# Patient Record
Sex: FEMALE | Race: WHITE | Employment: OTHER | URBAN - NONMETROPOLITAN AREA
[De-identification: names, ages, dates, MRNs, and addresses within clinical notes are randomized per-mention and may not be internally consistent; named-entity substitution may affect disease eponyms.]

---

## 2017-01-01 ENCOUNTER — HOSPITAL ENCOUNTER (INPATIENT)
Age: 82
DRG: 948 | End: 2017-01-01
Attending: ANESTHESIOLOGY | Admitting: ANESTHESIOLOGY
Payer: MEDICARE

## 2017-01-01 ENCOUNTER — APPOINTMENT (OUTPATIENT)
Dept: INTERVENTIONAL RADIOLOGY/VASCULAR | Age: 82
DRG: 034 | End: 2017-01-01
Attending: PSYCHIATRY & NEUROLOGY
Payer: MEDICARE

## 2017-01-01 ENCOUNTER — HOSPITAL ENCOUNTER (EMERGENCY)
Age: 82
Discharge: HOME | End: 2017-09-02
Attending: FAMILY MEDICINE
Payer: MEDICARE

## 2017-01-01 ENCOUNTER — INITIAL CONSULT (OUTPATIENT)
Dept: NEUROLOGY | Age: 82
End: 2017-01-01
Payer: MEDICARE

## 2017-01-01 ENCOUNTER — HOSPITAL ENCOUNTER (INPATIENT)
Dept: INPATIENT UNIT | Age: 82
LOS: 2 days | DRG: 948 | End: 2017-10-15
Attending: FAMILY MEDICINE | Admitting: FAMILY MEDICINE
Payer: MEDICARE

## 2017-01-01 ENCOUNTER — APPOINTMENT (OUTPATIENT)
Dept: CT IMAGING | Age: 82
DRG: 034 | End: 2017-01-01
Attending: PSYCHIATRY & NEUROLOGY
Payer: MEDICARE

## 2017-01-01 ENCOUNTER — APPOINTMENT (OUTPATIENT)
Dept: GENERAL RADIOLOGY | Age: 82
DRG: 034 | End: 2017-01-01
Attending: PSYCHIATRY & NEUROLOGY
Payer: MEDICARE

## 2017-01-01 ENCOUNTER — TELEPHONE (OUTPATIENT)
Dept: NEUROLOGY | Age: 82
End: 2017-01-01

## 2017-01-01 ENCOUNTER — APPOINTMENT (OUTPATIENT)
Dept: GENERAL RADIOLOGY | Age: 82
End: 2017-01-01
Payer: MEDICARE

## 2017-01-01 ENCOUNTER — HOSPITAL ENCOUNTER (OUTPATIENT)
Dept: INTERVENTIONAL RADIOLOGY/VASCULAR | Age: 82
Discharge: HOME | End: 2017-09-22

## 2017-01-01 ENCOUNTER — HOSPITAL ENCOUNTER (OUTPATIENT)
Dept: INTERVENTIONAL RADIOLOGY/VASCULAR | Age: 82
Discharge: HOME | DRG: 034 | End: 2017-09-25
Payer: MEDICARE

## 2017-01-01 ENCOUNTER — APPOINTMENT (OUTPATIENT)
Dept: GENERAL RADIOLOGY | Age: 82
DRG: 948 | End: 2017-01-01
Attending: FAMILY MEDICINE
Payer: MEDICARE

## 2017-01-01 ENCOUNTER — HOSPITAL ENCOUNTER (INPATIENT)
Dept: INPATIENT UNIT | Age: 82
LOS: 16 days | Discharge: NURSING, REHAB OR LONG-TERM CARE | DRG: 034 | End: 2017-10-13
Attending: PSYCHIATRY & NEUROLOGY | Admitting: PSYCHIATRY & NEUROLOGY
Payer: MEDICARE

## 2017-01-01 ENCOUNTER — ANESTHESIA (OUTPATIENT)
Dept: CARDIAC CATH/INVASIVE PROCEDURES | Age: 82
DRG: 034 | End: 2017-01-01
Payer: MEDICARE

## 2017-01-01 ENCOUNTER — ANESTHESIA EVENT (OUTPATIENT)
Dept: CARDIAC CATH/INVASIVE PROCEDURES | Age: 82
DRG: 034 | End: 2017-01-01
Payer: MEDICARE

## 2017-01-01 ENCOUNTER — APPOINTMENT (OUTPATIENT)
Dept: CARDIAC CATH/INVASIVE PROCEDURES | Age: 82
DRG: 034 | End: 2017-01-01
Payer: MEDICARE

## 2017-01-01 VITALS
RESPIRATION RATE: 16 BRPM | HEART RATE: 69 BPM | TEMPERATURE: 97.6 F | BODY MASS INDEX: 20.49 KG/M2 | HEIGHT: 64 IN | OXYGEN SATURATION: 94 % | DIASTOLIC BLOOD PRESSURE: 79 MMHG | WEIGHT: 120 LBS | SYSTOLIC BLOOD PRESSURE: 166 MMHG

## 2017-01-01 VITALS
HEART RATE: 66 BPM | DIASTOLIC BLOOD PRESSURE: 63 MMHG | SYSTOLIC BLOOD PRESSURE: 140 MMHG | TEMPERATURE: 97.8 F | OXYGEN SATURATION: 92 % | RESPIRATION RATE: 18 BRPM | BODY MASS INDEX: 25.2 KG/M2 | HEIGHT: 63 IN | WEIGHT: 142.2 LBS

## 2017-01-01 VITALS
SYSTOLIC BLOOD PRESSURE: 179 MMHG | DIASTOLIC BLOOD PRESSURE: 79 MMHG | HEART RATE: 60 BPM | WEIGHT: 107 LBS | RESPIRATION RATE: 16 BRPM | TEMPERATURE: 97.7 F | BODY MASS INDEX: 18.72 KG/M2

## 2017-01-01 VITALS
HEART RATE: 80 BPM | DIASTOLIC BLOOD PRESSURE: 75 MMHG | BODY MASS INDEX: 20.38 KG/M2 | SYSTOLIC BLOOD PRESSURE: 137 MMHG | HEIGHT: 63 IN | WEIGHT: 115 LBS

## 2017-01-01 VITALS — OXYGEN SATURATION: 100 % | SYSTOLIC BLOOD PRESSURE: 99 MMHG | DIASTOLIC BLOOD PRESSURE: 76 MMHG | TEMPERATURE: 96.8 F

## 2017-01-01 DIAGNOSIS — M54.16 LUMBAR RADICULOPATHY: ICD-10-CM

## 2017-01-01 DIAGNOSIS — I65.23 OCCLUSION AND STENOSIS OF BILATERAL CAROTID ARTERIES: ICD-10-CM

## 2017-01-01 DIAGNOSIS — I65.29 STENOSIS OF CAROTID ARTERY, UNSPECIFIED LATERALITY: Primary | ICD-10-CM

## 2017-01-01 DIAGNOSIS — M48.061 LUMBAR SPINAL STENOSIS: ICD-10-CM

## 2017-01-01 DIAGNOSIS — I46.9 CARDIAC ARREST (HCC): Primary | ICD-10-CM

## 2017-01-01 DIAGNOSIS — S39.012A LUMBAR STRAIN, INITIAL ENCOUNTER: Primary | ICD-10-CM

## 2017-01-01 LAB
ABO CHECK: NORMAL
ABO CHECK: NORMAL
ACT TEG: 128 SECONDS (ref 86–118)
ACTIVATED CLOTTING TIME: 252 SECONDS (ref 1–150)
ACTIVATED CLOTTING TIME: 290 SECONDS (ref 1–150)
ACTIVATED CLOTTING TIME: 290 SECONDS (ref 1–150)
ACTIVATED CLOTTING TIME: 318 SECONDS (ref 1–150)
ALBUMIN SERPL-MCNC: 1.8 G/DL (ref 3.5–5.1)
ALBUMIN SERPL-MCNC: 1.9 G/DL (ref 3.5–5.1)
ALBUMIN SERPL-MCNC: 2 G/DL (ref 3.5–5.1)
ALBUMIN SERPL-MCNC: 2.5 G/DL (ref 3.5–5.1)
ALBUMIN SERPL-MCNC: 3.7 G/DL (ref 3.5–5.1)
ALLEN TEST: ABNORMAL
ALLEN TEST: ABNORMAL
ALLEN TEST: NEGATIVE
ALLEN TEST: POSITIVE
ALP BLD-CCNC: 104 U/L (ref 38–126)
ALP BLD-CCNC: 202 U/L (ref 38–126)
ALP BLD-CCNC: 78 U/L (ref 38–126)
ALP BLD-CCNC: 98 U/L (ref 38–126)
ALP BLD-CCNC: 99 U/L (ref 38–126)
ALT SERPL-CCNC: 10 U/L (ref 11–66)
ALT SERPL-CCNC: 29 U/L (ref 11–66)
ALT SERPL-CCNC: 41 U/L (ref 11–66)
ALT SERPL-CCNC: 41 U/L (ref 11–66)
ALT SERPL-CCNC: 42 U/L (ref 11–66)
AMORPHOUS: ABNORMAL
ANGLE, RAPID TEG: 73.5 DEG (ref 64–80)
ANION GAP SERPL CALCULATED.3IONS-SCNC: 10 MEQ/L (ref 8–16)
ANION GAP SERPL CALCULATED.3IONS-SCNC: 11 MEQ/L (ref 8–16)
ANION GAP SERPL CALCULATED.3IONS-SCNC: 12 MEQ/L (ref 8–16)
ANION GAP SERPL CALCULATED.3IONS-SCNC: 13 MEQ/L (ref 8–16)
ANION GAP SERPL CALCULATED.3IONS-SCNC: 14 MEQ/L (ref 8–16)
ANION GAP SERPL CALCULATED.3IONS-SCNC: 15 MEQ/L (ref 8–16)
ANION GAP SERPL CALCULATED.3IONS-SCNC: 18 MEQ/L (ref 8–16)
ANION GAP SERPL CALCULATED.3IONS-SCNC: 8 MEQ/L (ref 8–16)
ANION GAP SERPL CALCULATED.3IONS-SCNC: 9 MEQ/L (ref 8–16)
ANISOCYTOSIS: ABNORMAL
ANTIBODY SCREEN: NORMAL
ANTIBODY SCREEN: NORMAL
APTT: 28.7 SECONDS (ref 22–38)
APTT: 29 SECONDS (ref 22–38)
APTT: 30.1 SECONDS (ref 22–38)
APTT: 30.9 SECONDS (ref 22–38)
APTT: 31 SECONDS (ref 22–38)
APTT: 32.5 SECONDS (ref 22–38)
APTT: 32.8 SECONDS (ref 22–38)
APTT: 33.3 SECONDS (ref 22–38)
APTT: 33.5 SECONDS (ref 22–38)
APTT: 37.5 SECONDS (ref 22–38)
APTT: 39.8 SECONDS (ref 22–38)
APTT: > 200 SECONDS (ref 22–38)
AST SERPL-CCNC: 16 U/L (ref 5–40)
AST SERPL-CCNC: 29 U/L (ref 5–40)
AST SERPL-CCNC: 47 U/L (ref 5–40)
AST SERPL-CCNC: 53 U/L (ref 5–40)
AST SERPL-CCNC: 80 U/L (ref 5–40)
BACTERIA: ABNORMAL
BACTERIA: ABNORMAL
BACTERIA: ABNORMAL /HPF
BACTERIA: ABNORMAL /HPF
BANDED NEUTROPHILS ABSOLUTE COUNT: 0.4 THOU/MM3
BANDS PRESENT: 1 %
BASE EXCESS (CALCULATED): -1.5 MMOL/L (ref -2.5–2.5)
BASE EXCESS (CALCULATED): -11.9 MMOL/L (ref -2.5–2.5)
BASE EXCESS (CALCULATED): -12.9 MMOL/L (ref -2.5–2.5)
BASE EXCESS (CALCULATED): -4.3 MMOL/L (ref -2.5–2.5)
BASE EXCESS (CALCULATED): -9.5 MMOL/L (ref -2.5–2.5)
BASE EXCESS (CALCULATED): 0 MMOL/L (ref -2.5–2.5)
BASE EXCESS (CALCULATED): 10.9 MMOL/L (ref -2.5–2.5)
BASE EXCESS (CALCULATED): 11.1 MMOL/L (ref -2.5–2.5)
BASE EXCESS (CALCULATED): 11.2 MMOL/L (ref -2.5–2.5)
BASE EXCESS (CALCULATED): 11.5 MMOL/L (ref -2.5–2.5)
BASE EXCESS (CALCULATED): 11.7 MMOL/L (ref -2.5–2.5)
BASE EXCESS (CALCULATED): 12.2 MMOL/L (ref -2.5–2.5)
BASE EXCESS (CALCULATED): 12.6 MMOL/L (ref -2.5–2.5)
BASE EXCESS (CALCULATED): 12.8 MMOL/L (ref -2.5–2.5)
BASE EXCESS (CALCULATED): 5.6 MMOL/L (ref -2.5–2.5)
BASE EXCESS (CALCULATED): 6.4 MMOL/L (ref -2.5–2.5)
BASE EXCESS (CALCULATED): 8.1 MMOL/L (ref -2.5–2.5)
BASE EXCESS MIXED: -12.2 MMOL/L (ref -2–3)
BASE EXCESS MIXED: 10.1 MMOL/L (ref -2–3)
BASE EXCESS MIXED: 3.6 MMOL/L (ref -2–3)
BASE EXCESS MIXED: 5.6 MMOL/L (ref -2–3)
BASOPHILS # BLD: 0 %
BASOPHILS # BLD: 0.1 %
BASOPHILS # BLD: 0.6 %
BASOPHILS # BLD: 0.7 %
BASOPHILS ABSOLUTE: 0 THOU/MM3 (ref 0–0.1)
BASOPHILS ABSOLUTE: 0 THOU/MM3 (ref 0–0.1)
BASOPHILS ABSOLUTE: 0.1 THOU/MM3 (ref 0–0.1)
BASOPHILS ABSOLUTE: 0.1 THOU/MM3 (ref 0–0.1)
BILIRUB SERPL-MCNC: 0.2 MG/DL (ref 0.3–1.2)
BILIRUB SERPL-MCNC: 0.3 MG/DL (ref 0.3–1.2)
BILIRUB SERPL-MCNC: 0.3 MG/DL (ref 0.3–1.2)
BILIRUB SERPL-MCNC: 0.4 MG/DL (ref 0.3–1.2)
BILIRUB SERPL-MCNC: 0.4 MG/DL (ref 0.3–1.2)
BILIRUBIN DIRECT: < 0.2 MG/DL (ref 0–0.3)
BILIRUBIN DIRECT: < 0.2 MG/DL (ref 0–0.3)
BILIRUBIN URINE: ABNORMAL
BILIRUBIN URINE: ABNORMAL
BILIRUBIN URINE: NEGATIVE
BILIRUBIN URINE: NEGATIVE
BLOOD, URINE: ABNORMAL
BLOOD, URINE: NEGATIVE
BUN BLDV-MCNC: 15 MG/DL (ref 7–22)
BUN BLDV-MCNC: 16 MG/DL (ref 7–22)
BUN BLDV-MCNC: 16 MG/DL (ref 7–22)
BUN BLDV-MCNC: 17 MG/DL (ref 7–22)
BUN BLDV-MCNC: 18 MG/DL (ref 7–22)
BUN BLDV-MCNC: 18 MG/DL (ref 7–22)
BUN BLDV-MCNC: 19 MG/DL (ref 7–22)
BUN BLDV-MCNC: 20 MG/DL (ref 7–22)
BUN BLDV-MCNC: 20 MG/DL (ref 7–22)
BUN BLDV-MCNC: 21 MG/DL (ref 7–22)
BUN BLDV-MCNC: 22 MG/DL (ref 7–22)
BUN BLDV-MCNC: 22 MG/DL (ref 7–22)
BUN BLDV-MCNC: 23 MG/DL (ref 7–22)
BUN BLDV-MCNC: 24 MG/DL (ref 7–22)
BUN BLDV-MCNC: 27 MG/DL (ref 7–22)
BUN BLDV-MCNC: 27 MG/DL (ref 7–22)
BUN BLDV-MCNC: 28 MG/DL (ref 7–22)
BUN BLDV-MCNC: 30 MG/DL (ref 7–22)
BUN BLDV-MCNC: 31 MG/DL (ref 7–22)
BUN BLDV-MCNC: 32 MG/DL (ref 7–22)
BUN BLDV-MCNC: 32 MG/DL (ref 7–22)
BUN BLDV-MCNC: 34 MG/DL (ref 7–22)
BUN BLDV-MCNC: 35 MG/DL (ref 7–22)
BUN BLDV-MCNC: 36 MG/DL (ref 7–22)
BUN BLDV-MCNC: 37 MG/DL (ref 7–22)
BUN BLDV-MCNC: 38 MG/DL (ref 7–22)
BUN BLDV-MCNC: 39 MG/DL (ref 7–22)
CALCIUM IONIZED SERUM: 0.35 MMOL/L (ref 1.12–1.32)
CALCIUM IONIZED SERUM: 0.36 MMOL/L (ref 1.12–1.32)
CALCIUM IONIZED SERUM: 0.36 MMOL/L (ref 1.12–1.32)
CALCIUM IONIZED SERUM: 0.37 MMOL/L (ref 1.12–1.32)
CALCIUM IONIZED SERUM: 0.38 MMOL/L (ref 1.12–1.32)
CALCIUM IONIZED SERUM: 0.4 MMOL/L (ref 1.12–1.32)
CALCIUM IONIZED SERUM: 0.42 MMOL/L (ref 1.12–1.32)
CALCIUM IONIZED SERUM: 0.44 MMOL/L (ref 1.12–1.32)
CALCIUM IONIZED SERUM: 0.45 MMOL/L (ref 1.12–1.32)
CALCIUM IONIZED SERUM: 0.47 MMOL/L (ref 1.12–1.32)
CALCIUM IONIZED SERUM: 0.47 MMOL/L (ref 1.12–1.32)
CALCIUM IONIZED SERUM: 0.49 MMOL/L (ref 1.12–1.32)
CALCIUM IONIZED SERUM: 0.49 MMOL/L (ref 1.12–1.32)
CALCIUM IONIZED SERUM: 0.5 MMOL/L (ref 1.12–1.32)
CALCIUM IONIZED SERUM: 0.51 MMOL/L (ref 1.12–1.32)
CALCIUM IONIZED SERUM: 0.52 MMOL/L (ref 1.12–1.32)
CALCIUM IONIZED SERUM: 0.53 MMOL/L (ref 1.12–1.32)
CALCIUM IONIZED SERUM: 0.54 MMOL/L (ref 1.12–1.32)
CALCIUM IONIZED SERUM: 0.55 MMOL/L (ref 1.12–1.32)
CALCIUM IONIZED SERUM: 0.55 MMOL/L (ref 1.12–1.32)
CALCIUM IONIZED SERUM: 0.56 MMOL/L (ref 1.12–1.32)
CALCIUM IONIZED SERUM: 0.75 MMOL/L (ref 1.12–1.32)
CALCIUM IONIZED SERUM: 0.85 MMOL/L (ref 1.12–1.32)
CALCIUM IONIZED SERUM: 0.92 MMOL/L (ref 1.12–1.32)
CALCIUM IONIZED SERUM: 0.96 MMOL/L (ref 1.12–1.32)
CALCIUM IONIZED SERUM: 0.97 MMOL/L (ref 1.12–1.32)
CALCIUM IONIZED SERUM: 0.98 MMOL/L (ref 1.12–1.32)
CALCIUM IONIZED SERUM: 0.99 MMOL/L (ref 1.12–1.32)
CALCIUM IONIZED SERUM: 1 MMOL/L (ref 1.12–1.32)
CALCIUM IONIZED SERUM: 1 MMOL/L (ref 1.12–1.32)
CALCIUM IONIZED SERUM: 1.01 MMOL/L (ref 1.12–1.32)
CALCIUM IONIZED SERUM: 1.02 MMOL/L (ref 1.12–1.32)
CALCIUM IONIZED SERUM: 1.02 MMOL/L (ref 1.12–1.32)
CALCIUM IONIZED SERUM: 1.03 MMOL/L (ref 1.12–1.32)
CALCIUM IONIZED SERUM: 1.03 MMOL/L (ref 1.12–1.32)
CALCIUM IONIZED SERUM: 1.04 MMOL/L (ref 1.12–1.32)
CALCIUM IONIZED SERUM: 1.05 MMOL/L (ref 1.12–1.32)
CALCIUM IONIZED SERUM: 1.06 MMOL/L (ref 1.12–1.32)
CALCIUM IONIZED SERUM: 1.07 MMOL/L (ref 1.12–1.32)
CALCIUM IONIZED SERUM: 1.07 MMOL/L (ref 1.12–1.32)
CALCIUM IONIZED SERUM: 1.09 MMOL/L (ref 1.12–1.32)
CALCIUM IONIZED SERUM: 1.1 MMOL/L (ref 1.12–1.32)
CALCIUM IONIZED SERUM: 1.12 MMOL/L (ref 1.12–1.32)
CALCIUM IONIZED SERUM: 1.13 MMOL/L (ref 1.12–1.32)
CALCIUM IONIZED: 0.88 MMOL/L (ref 1.12–1.32)
CALCIUM IONIZED: 0.95 MMOL/L (ref 1.12–1.32)
CALCIUM IONIZED: 0.97 MMOL/L (ref 1.12–1.32)
CALCIUM IONIZED: 0.98 MMOL/L (ref 1.12–1.32)
CALCIUM IONIZED: 0.99 MMOL/L (ref 1.12–1.32)
CALCIUM IONIZED: 0.99 MMOL/L (ref 1.12–1.32)
CALCIUM IONIZED: 1.01 MMOL/L (ref 1.12–1.32)
CALCIUM IONIZED: 1.03 MMOL/L (ref 1.12–1.32)
CALCIUM IONIZED: 1.04 MMOL/L (ref 1.12–1.32)
CALCIUM IONIZED: 1.1 MMOL/L (ref 1.12–1.32)
CALCIUM IONIZED: 1.1 MMOL/L (ref 1.12–1.32)
CALCIUM IONIZED: 1.11 MMOL/L (ref 1.12–1.32)
CALCIUM IONIZED: 1.16 MMOL/L (ref 1.12–1.32)
CALCIUM IONIZED: 1.22 MMOL/L (ref 1.12–1.32)
CALCIUM SERPL-MCNC: 6.9 MG/DL (ref 8.5–10.5)
CALCIUM SERPL-MCNC: 6.9 MG/DL (ref 8.5–10.5)
CALCIUM SERPL-MCNC: 7 MG/DL (ref 8.5–10.5)
CALCIUM SERPL-MCNC: 7.3 MG/DL (ref 8.5–10.5)
CALCIUM SERPL-MCNC: 7.3 MG/DL (ref 8.5–10.5)
CALCIUM SERPL-MCNC: 7.4 MG/DL (ref 8.5–10.5)
CALCIUM SERPL-MCNC: 7.4 MG/DL (ref 8.5–10.5)
CALCIUM SERPL-MCNC: 7.5 MG/DL (ref 8.5–10.5)
CALCIUM SERPL-MCNC: 7.6 MG/DL (ref 8.5–10.5)
CALCIUM SERPL-MCNC: 7.7 MG/DL (ref 8.5–10.5)
CALCIUM SERPL-MCNC: 7.8 MG/DL (ref 8.5–10.5)
CALCIUM SERPL-MCNC: 7.9 MG/DL (ref 8.5–10.5)
CALCIUM SERPL-MCNC: 7.9 MG/DL (ref 8.5–10.5)
CALCIUM SERPL-MCNC: 8 MG/DL (ref 8.5–10.5)
CALCIUM SERPL-MCNC: 8 MG/DL (ref 8.5–10.5)
CALCIUM SERPL-MCNC: 8.1 MG/DL (ref 8.5–10.5)
CALCIUM SERPL-MCNC: 8.2 MG/DL (ref 8.5–10.5)
CALCIUM SERPL-MCNC: 8.2 MG/DL (ref 8.5–10.5)
CALCIUM SERPL-MCNC: 8.3 MG/DL (ref 8.5–10.5)
CALCIUM SERPL-MCNC: 8.4 MG/DL (ref 8.5–10.5)
CALCIUM SERPL-MCNC: 8.5 MG/DL (ref 8.5–10.5)
CALCIUM SERPL-MCNC: 8.8 MG/DL (ref 8.5–10.5)
CALCIUM SERPL-MCNC: 8.8 MG/DL (ref 8.5–10.5)
CALCIUM SERPL-MCNC: 9 MG/DL (ref 8.5–10.5)
CALCIUM SERPL-MCNC: 9 MG/DL (ref 8.5–10.5)
CALCIUM SERPL-MCNC: 9.2 MG/DL (ref 8.5–10.5)
CALCIUM SERPL-MCNC: 9.4 MG/DL (ref 8.5–10.5)
CASTS 2: ABNORMAL /LPF
CASTS 2: ABNORMAL /LPF
CASTS UA: ABNORMAL /LPF
CASTS UA: ABNORMAL /LPF
CASTS: ABNORMAL /LPF
CHARACTER, URINE: ABNORMAL
CHARACTER, URINE: CLEAR
CHLORIDE BLD-SCNC: 100 MEQ/L (ref 98–111)
CHLORIDE BLD-SCNC: 101 MEQ/L (ref 98–111)
CHLORIDE BLD-SCNC: 102 MEQ/L (ref 98–111)
CHLORIDE BLD-SCNC: 103 MEQ/L (ref 98–111)
CHLORIDE BLD-SCNC: 104 MEQ/L (ref 98–111)
CHLORIDE BLD-SCNC: 105 MEQ/L (ref 98–111)
CHLORIDE BLD-SCNC: 107 MEQ/L (ref 98–111)
CHLORIDE BLD-SCNC: 108 MEQ/L (ref 98–111)
CHLORIDE BLD-SCNC: 109 MEQ/L (ref 98–111)
CHLORIDE BLD-SCNC: 110 MEQ/L (ref 98–111)
CHLORIDE BLD-SCNC: 91 MEQ/L (ref 98–111)
CHLORIDE BLD-SCNC: 92 MEQ/L (ref 98–111)
CHLORIDE BLD-SCNC: 94 MEQ/L (ref 98–111)
CHLORIDE BLD-SCNC: 96 MEQ/L (ref 98–111)
CHLORIDE BLD-SCNC: 97 MEQ/L (ref 98–111)
CHLORIDE BLD-SCNC: 98 MEQ/L (ref 98–111)
CHLORIDE BLD-SCNC: 98 MEQ/L (ref 98–111)
CHLORIDE BLD-SCNC: 99 MEQ/L (ref 98–111)
CHLORIDE, URINE: 49 MEQ/L
CO2: 14 MEQ/L (ref 23–33)
CO2: 14 MEQ/L (ref 23–33)
CO2: 19 MEQ/L (ref 23–33)
CO2: 20 MEQ/L (ref 23–33)
CO2: 22 MEQ/L (ref 23–33)
CO2: 23 MEQ/L (ref 23–33)
CO2: 24 MEQ/L (ref 23–33)
CO2: 25 MEQ/L (ref 23–33)
CO2: 26 MEQ/L (ref 23–33)
CO2: 27 MEQ/L (ref 23–33)
CO2: 28 MEQ/L (ref 23–33)
CO2: 30 MEQ/L (ref 23–33)
CO2: 30 MEQ/L (ref 23–33)
CO2: 32 MEQ/L (ref 23–33)
CO2: 33 MEQ/L (ref 23–33)
CO2: 34 MEQ/L (ref 23–33)
CO2: 34 MEQ/L (ref 23–33)
CO2: 35 MEQ/L (ref 23–33)
COLLECTED BY:: ABNORMAL
COLOR: ABNORMAL
COLOR: YELLOW
COMMENT: ABNORMAL
COMMENT: NORMAL
COMMENT: NORMAL
CREAT SERPL-MCNC: 0.7 MG/DL (ref 0.4–1.2)
CREAT SERPL-MCNC: 0.8 MG/DL (ref 0.4–1.2)
CREAT SERPL-MCNC: 0.9 MG/DL (ref 0.4–1.2)
CREAT SERPL-MCNC: 1 MG/DL (ref 0.4–1.2)
CREAT SERPL-MCNC: 1.1 MG/DL (ref 0.4–1.2)
CREAT SERPL-MCNC: 1.1 MG/DL (ref 0.4–1.2)
CREAT SERPL-MCNC: 1.2 MG/DL (ref 0.4–1.2)
CREAT SERPL-MCNC: 1.2 MG/DL (ref 0.4–1.2)
CREAT SERPL-MCNC: 1.3 MG/DL (ref 0.4–1.2)
CREAT SERPL-MCNC: 1.3 MG/DL (ref 0.4–1.2)
CREAT SERPL-MCNC: 1.4 MG/DL (ref 0.4–1.2)
CREAT SERPL-MCNC: 1.4 MG/DL (ref 0.4–1.2)
CREAT SERPL-MCNC: 1.5 MG/DL (ref 0.4–1.2)
CREAT SERPL-MCNC: 1.5 MG/DL (ref 0.4–1.2)
CREAT SERPL-MCNC: 1.6 MG/DL (ref 0.4–1.2)
CREAT SERPL-MCNC: 2 MG/DL (ref 0.4–1.2)
CREAT SERPL-MCNC: 2.2 MG/DL (ref 0.4–1.2)
CREAT SERPL-MCNC: 2.3 MG/DL (ref 0.4–1.2)
CREAT SERPL-MCNC: 2.3 MG/DL (ref 0.4–1.2)
CREAT SERPL-MCNC: 2.4 MG/DL (ref 0.4–1.2)
CRENATED RBC'S: ABNORMAL
CRYSTALS, UA: ABNORMAL
CRYSTALS, UA: ABNORMAL
CRYSTALS: ABNORMAL
CRYSTALS: ABNORMAL
DEVICE: ABNORMAL
DIFFERENTIAL TYPE: ABNORMAL
EKG ATRIAL RATE: 74 BPM
EKG ATRIAL RATE: 82 BPM
EKG P-R INTERVAL: 146 MS
EKG P-R INTERVAL: 192 MS
EKG Q-T INTERVAL: 420 MS
EKG Q-T INTERVAL: 444 MS
EKG QRS DURATION: 74 MS
EKG QRS DURATION: 88 MS
EKG QTC CALCULATION (BAZETT): 490 MS
EKG QTC CALCULATION (BAZETT): 492 MS
EKG R AXIS: -33 DEGREES
EKG R AXIS: -44 DEGREES
EKG T AXIS: -127 DEGREES
EKG T AXIS: 81 DEGREES
EKG VENTRICULAR RATE: 74 BPM
EKG VENTRICULAR RATE: 82 BPM
EOSINOPHIL # BLD: 0 %
EOSINOPHIL # BLD: 0.1 %
EOSINOPHIL # BLD: 1.4 %
EOSINOPHIL # BLD: 3.6 %
EOSINOPHILS ABSOLUTE: 0 THOU/MM3 (ref 0–0.4)
EOSINOPHILS ABSOLUTE: 0 THOU/MM3 (ref 0–0.4)
EOSINOPHILS ABSOLUTE: 0.1 THOU/MM3 (ref 0–0.4)
EOSINOPHILS ABSOLUTE: 0.3 THOU/MM3 (ref 0–0.4)
EPITHELIAL CELLS, UA: ABNORMAL /HPF
EPL-TEG: 0 % (ref 0–15)
FIBRINOGEN: 155 MG/100ML (ref 155–475)
FIBRINOGEN: 194 MG/100ML (ref 155–475)
FIBRINOGEN: 195 MG/100ML (ref 155–475)
FIO2, MIXED VENOUS: 25
FIO2, MIXED VENOUS: 30
GFR SERPL CREATININE-BSD FRML MDRD: 19 ML/MIN/1.73M2
GFR SERPL CREATININE-BSD FRML MDRD: 20 ML/MIN/1.73M2
GFR SERPL CREATININE-BSD FRML MDRD: 20 ML/MIN/1.73M2
GFR SERPL CREATININE-BSD FRML MDRD: 21 ML/MIN/1.73M2
GFR SERPL CREATININE-BSD FRML MDRD: 24 ML/MIN/1.73M2
GFR SERPL CREATININE-BSD FRML MDRD: 31 ML/MIN/1.73M2
GFR SERPL CREATININE-BSD FRML MDRD: 33 ML/MIN/1.73M2
GFR SERPL CREATININE-BSD FRML MDRD: 33 ML/MIN/1.73M2
GFR SERPL CREATININE-BSD FRML MDRD: 36 ML/MIN/1.73M2
GFR SERPL CREATININE-BSD FRML MDRD: 36 ML/MIN/1.73M2
GFR SERPL CREATININE-BSD FRML MDRD: 39 ML/MIN/1.73M2
GFR SERPL CREATININE-BSD FRML MDRD: 39 ML/MIN/1.73M2
GFR SERPL CREATININE-BSD FRML MDRD: 43 ML/MIN/1.73M2
GFR SERPL CREATININE-BSD FRML MDRD: 43 ML/MIN/1.73M2
GFR SERPL CREATININE-BSD FRML MDRD: 48 ML/MIN/1.73M2
GFR SERPL CREATININE-BSD FRML MDRD: 48 ML/MIN/1.73M2
GFR SERPL CREATININE-BSD FRML MDRD: 53 ML/MIN/1.73M2
GFR SERPL CREATININE-BSD FRML MDRD: 60 ML/MIN/1.73M2
GFR SERPL CREATININE-BSD FRML MDRD: 69 ML/MIN/1.73M2
GFR SERPL CREATININE-BSD FRML MDRD: 80 ML/MIN/1.73M2
GLUCOSE BLD-MCNC: 101 MG/DL (ref 70–108)
GLUCOSE BLD-MCNC: 102 MG/DL (ref 70–108)
GLUCOSE BLD-MCNC: 102 MG/DL (ref 70–108)
GLUCOSE BLD-MCNC: 105 MG/DL (ref 70–108)
GLUCOSE BLD-MCNC: 106 MG/DL (ref 70–108)
GLUCOSE BLD-MCNC: 106 MG/DL (ref 70–108)
GLUCOSE BLD-MCNC: 110 MG/DL (ref 70–108)
GLUCOSE BLD-MCNC: 112 MG/DL (ref 70–108)
GLUCOSE BLD-MCNC: 112 MG/DL (ref 70–108)
GLUCOSE BLD-MCNC: 115 MG/DL (ref 70–108)
GLUCOSE BLD-MCNC: 115 MG/DL (ref 70–108)
GLUCOSE BLD-MCNC: 116 MG/DL (ref 70–108)
GLUCOSE BLD-MCNC: 118 MG/DL (ref 70–108)
GLUCOSE BLD-MCNC: 120 MG/DL (ref 70–108)
GLUCOSE BLD-MCNC: 121 MG/DL (ref 70–108)
GLUCOSE BLD-MCNC: 124 MG/DL (ref 70–108)
GLUCOSE BLD-MCNC: 129 MG/DL (ref 70–108)
GLUCOSE BLD-MCNC: 131 MG/DL (ref 70–108)
GLUCOSE BLD-MCNC: 133 MG/DL (ref 70–108)
GLUCOSE BLD-MCNC: 134 MG/DL (ref 70–108)
GLUCOSE BLD-MCNC: 143 MG/DL (ref 70–108)
GLUCOSE BLD-MCNC: 143 MG/DL (ref 70–108)
GLUCOSE BLD-MCNC: 144 MG/DL (ref 70–108)
GLUCOSE BLD-MCNC: 145 MG/DL (ref 70–108)
GLUCOSE BLD-MCNC: 147 MG/DL (ref 70–108)
GLUCOSE BLD-MCNC: 148 MG/DL (ref 70–108)
GLUCOSE BLD-MCNC: 151 MG/DL (ref 70–108)
GLUCOSE BLD-MCNC: 159 MG/DL (ref 70–108)
GLUCOSE BLD-MCNC: 160 MG/DL (ref 70–108)
GLUCOSE BLD-MCNC: 160 MG/DL (ref 70–108)
GLUCOSE BLD-MCNC: 161 MG/DL (ref 70–108)
GLUCOSE BLD-MCNC: 165 MG/DL (ref 70–108)
GLUCOSE BLD-MCNC: 184 MG/DL (ref 70–108)
GLUCOSE BLD-MCNC: 72 MG/DL (ref 70–108)
GLUCOSE BLD-MCNC: 82 MG/DL (ref 70–108)
GLUCOSE BLD-MCNC: 83 MG/DL (ref 70–108)
GLUCOSE BLD-MCNC: 90 MG/DL (ref 70–108)
GLUCOSE BLD-MCNC: 91 MG/DL (ref 70–108)
GLUCOSE BLD-MCNC: 93 MG/DL (ref 70–108)
GLUCOSE BLD-MCNC: 97 MG/DL (ref 70–108)
GLUCOSE URINE: 100 MG/DL
GLUCOSE URINE: NEGATIVE MG/DL
GLUCOSE, URINE: NEGATIVE MG/DL
GLUCOSE, URINE: NEGATIVE MG/DL
GLUCOSE, WHOLE BLOOD: 126 MG/DL (ref 70–108)
GLUCOSE, WHOLE BLOOD: 133 MG/DL (ref 70–108)
GLUCOSE, WHOLE BLOOD: 223 MG/DL (ref 70–108)
GRAM STAIN RESULT: NORMAL
GRAM STAIN RESULT: NORMAL
HCO3, MIXED: 16 MMOL/L (ref 23–28)
HCO3, MIXED: 30 MMOL/L (ref 23–28)
HCO3, MIXED: 30 MMOL/L (ref 23–28)
HCO3, MIXED: 35 MMOL/L (ref 23–28)
HCO3: 13 MMOL/L (ref 23–28)
HCO3: 14 MMOL/L (ref 23–28)
HCO3: 18 MMOL/L (ref 23–28)
HCO3: 19 MMOL/L (ref 23–28)
HCO3: 22 MMOL/L (ref 23–28)
HCO3: 24 MMOL/L (ref 23–28)
HCO3: 28 MMOL/L (ref 23–28)
HCO3: 32 MMOL/L (ref 23–28)
HCO3: 32 MMOL/L (ref 23–28)
HCO3: 35 MMOL/L (ref 23–28)
HCO3: 36 MMOL/L (ref 23–28)
HCO3: 37 MMOL/L (ref 23–28)
HCT VFR BLD CALC: 19.4 % (ref 37–47)
HCT VFR BLD CALC: 19.7 % (ref 37–47)
HCT VFR BLD CALC: 20.5 % (ref 37–47)
HCT VFR BLD CALC: 21.1 % (ref 37–47)
HCT VFR BLD CALC: 21.4 % (ref 37–47)
HCT VFR BLD CALC: 22 % (ref 37–47)
HCT VFR BLD CALC: 22.5 % (ref 37–47)
HCT VFR BLD CALC: 22.8 % (ref 37–47)
HCT VFR BLD CALC: 23.2 % (ref 37–47)
HCT VFR BLD CALC: 23.8 % (ref 37–47)
HCT VFR BLD CALC: 23.8 % (ref 37–47)
HCT VFR BLD CALC: 23.9 % (ref 37–47)
HCT VFR BLD CALC: 24.6 % (ref 37–47)
HCT VFR BLD CALC: 26.9 % (ref 37–47)
HCT VFR BLD CALC: 27.1 % (ref 37–47)
HCT VFR BLD CALC: 27.7 % (ref 37–47)
HCT VFR BLD CALC: 27.9 % (ref 37–47)
HCT VFR BLD CALC: 29.1 % (ref 37–47)
HCT VFR BLD CALC: 29.3 % (ref 37–47)
HCT VFR BLD CALC: 31.5 % (ref 37–47)
HCT VFR BLD CALC: 33.9 % (ref 37–47)
HCT VFR BLD CALC: 34.3 % (ref 37–47)
HCT VFR BLD CALC: 34.3 % (ref 37–47)
HCT VFR BLD CALC: 37.6 % (ref 37–47)
HCT VFR BLD CALC: 41.7 % (ref 37–47)
HCT VFR BLD CALC: 42 % (ref 37–47)
HEMOGLOBIN: 10 GM/DL (ref 12–16)
HEMOGLOBIN: 10.4 GM/DL (ref 12–16)
HEMOGLOBIN: 11.2 GM/DL (ref 12–16)
HEMOGLOBIN: 11.3 GM/DL (ref 12–16)
HEMOGLOBIN: 11.6 GM/DL (ref 12–16)
HEMOGLOBIN: 12.5 GM/DL (ref 12–16)
HEMOGLOBIN: 14 GM/DL (ref 12–16)
HEMOGLOBIN: 14.1 GM/DL (ref 12–16)
HEMOGLOBIN: 6.7 GM/DL (ref 12–16)
HEMOGLOBIN: 6.8 GM/DL (ref 12–16)
HEMOGLOBIN: 7.1 GM/DL (ref 12–16)
HEMOGLOBIN: 7.3 GM/DL (ref 12–16)
HEMOGLOBIN: 7.3 GM/DL (ref 12–16)
HEMOGLOBIN: 7.4 GM/DL (ref 12–16)
HEMOGLOBIN: 7.9 GM/DL (ref 12–16)
HEMOGLOBIN: 7.9 GM/DL (ref 12–16)
HEMOGLOBIN: 8 GM/DL (ref 12–16)
HEMOGLOBIN: 8.1 GM/DL (ref 12–16)
HEMOGLOBIN: 8.1 GM/DL (ref 12–16)
HEMOGLOBIN: 8.2 GM/DL (ref 12–16)
HEMOGLOBIN: 8.3 GM/DL (ref 12–16)
HEMOGLOBIN: 9 GM/DL (ref 12–16)
HEMOGLOBIN: 9.1 GM/DL (ref 12–16)
HEMOGLOBIN: 9.1 GM/DL (ref 12–16)
HEMOGLOBIN: 9.3 GM/DL (ref 12–16)
HEMOGLOBIN: 9.8 GM/DL (ref 12–16)
HEPARIN THERAPY: NO
ICTOTEST: NEGATIVE
ICTOTEST: NEGATIVE
IFIO2: 2
IFIO2: 21
IFIO2: 21
IFIO2: 25
IFIO2: 25
IFIO2: 3
IFIO2: 30
IFIO2: 35
IFIO2: 4
IFIO2: 4
IFIO2: 40
IFIO2: 50
INR BLD: 0.95 (ref 0.85–1.13)
INR BLD: 0.97 (ref 0.85–1.13)
INR BLD: 1 (ref 0.85–1.13)
INR BLD: 1 (ref 0.85–1.13)
INR BLD: 1.01 (ref 0.85–1.13)
INR BLD: 1.03 (ref 0.85–1.13)
INR BLD: 1.06 (ref 0.85–1.13)
INR BLD: 1.08 (ref 0.85–1.13)
INR BLD: 1.08 (ref 0.85–1.13)
INR BLD: 1.11 (ref 0.85–1.13)
INR BLD: 1.11 (ref 0.85–1.13)
INR BLD: 1.33 (ref 0.85–1.13)
KETONES, URINE: 15
KETONES, URINE: ABNORMAL
KETONES, URINE: NEGATIVE
KETONES, URINE: NEGATIVE
KINETICS RAPID TEG: 1.2 MINUTES (ref 0.5–2.3)
LACTIC ACID: 1.7 MMOL/L (ref 0.5–2.2)
LACTIC ACID: 2.1 MMOL/L (ref 0.5–2.2)
LACTIC ACID: 2.6 MMOL/L (ref 0.5–2.2)
LACTIC ACID: 3.7 MMOL/L (ref 0.5–2.2)
LACTIC ACID: 4 MMOL/L (ref 0.5–2.2)
LEUKOCYTE EST, POC: ABNORMAL
LEUKOCYTE EST, POC: NEGATIVE
LEUKOCYTE ESTERASE, URINE: ABNORMAL
LEUKOCYTE ESTERASE, URINE: NEGATIVE
LV EF: 48 %
LVEF MODALITY: NORMAL
LY30 (LYSIS) TEG: 0 % (ref 0–7.5)
LYMPHOCYTES # BLD: 13.1 %
LYMPHOCYTES # BLD: 3 %
LYMPHOCYTES # BLD: 7.3 %
LYMPHOCYTES # BLD: 9.9 %
LYMPHOCYTES ABSOLUTE: 0.6 THOU/MM3 (ref 1–4.8)
LYMPHOCYTES ABSOLUTE: 1 THOU/MM3 (ref 1–4.8)
LYMPHOCYTES ABSOLUTE: 1.2 THOU/MM3 (ref 1–4.8)
LYMPHOCYTES ABSOLUTE: 1.7 THOU/MM3 (ref 1–4.8)
MA(MAX CLOT) RAPID TEG: 62 MM (ref 52–71)
MAGNESIUM: 1.5 MG/DL (ref 1.6–2.4)
MAGNESIUM: 1.6 MG/DL (ref 1.6–2.4)
MAGNESIUM: 1.7 MG/DL (ref 1.6–2.4)
MAGNESIUM: 1.7 MG/DL (ref 1.6–2.4)
MAGNESIUM: 1.8 MG/DL (ref 1.6–2.4)
MAGNESIUM: 1.9 MG/DL (ref 1.6–2.4)
MAGNESIUM: 2 MG/DL (ref 1.6–2.4)
MAGNESIUM: 2.1 MG/DL (ref 1.6–2.4)
MCH RBC QN AUTO: 29.7 PG (ref 27–31)
MCH RBC QN AUTO: 29.8 PG (ref 27–31)
MCH RBC QN AUTO: 29.8 PG (ref 27–31)
MCH RBC QN AUTO: 29.9 PG (ref 27–31)
MCH RBC QN AUTO: 30.1 PG (ref 27–31)
MCH RBC QN AUTO: 30.2 PG (ref 27–31)
MCH RBC QN AUTO: 30.3 PG (ref 27–31)
MCH RBC QN AUTO: 30.4 PG (ref 27–31)
MCH RBC QN AUTO: 30.4 PG (ref 27–31)
MCH RBC QN AUTO: 30.5 PG (ref 27–31)
MCH RBC QN AUTO: 30.5 PG (ref 27–31)
MCH RBC QN AUTO: 30.7 PG (ref 27–31)
MCH RBC QN AUTO: 30.8 PG (ref 27–31)
MCH RBC QN AUTO: 31 PG (ref 27–31)
MCH RBC QN AUTO: 31.1 PG (ref 27–31)
MCH RBC QN AUTO: 31.2 PG (ref 27–31)
MCHC RBC AUTO-ENTMCNC: 32.5 GM/DL (ref 33–37)
MCHC RBC AUTO-ENTMCNC: 32.9 GM/DL (ref 33–37)
MCHC RBC AUTO-ENTMCNC: 32.9 GM/DL (ref 33–37)
MCHC RBC AUTO-ENTMCNC: 33 GM/DL (ref 33–37)
MCHC RBC AUTO-ENTMCNC: 33 GM/DL (ref 33–37)
MCHC RBC AUTO-ENTMCNC: 33.2 GM/DL (ref 33–37)
MCHC RBC AUTO-ENTMCNC: 33.3 GM/DL (ref 33–37)
MCHC RBC AUTO-ENTMCNC: 33.4 GM/DL (ref 33–37)
MCHC RBC AUTO-ENTMCNC: 33.5 GM/DL (ref 33–37)
MCHC RBC AUTO-ENTMCNC: 33.5 GM/DL (ref 33–37)
MCHC RBC AUTO-ENTMCNC: 33.7 GM/DL (ref 33–37)
MCHC RBC AUTO-ENTMCNC: 33.7 GM/DL (ref 33–37)
MCHC RBC AUTO-ENTMCNC: 33.9 GM/DL (ref 33–37)
MCHC RBC AUTO-ENTMCNC: 34.2 GM/DL (ref 33–37)
MCHC RBC AUTO-ENTMCNC: 34.3 GM/DL (ref 33–37)
MCHC RBC AUTO-ENTMCNC: 34.4 GM/DL (ref 33–37)
MCHC RBC AUTO-ENTMCNC: 34.4 GM/DL (ref 33–37)
MCHC RBC AUTO-ENTMCNC: 34.5 GM/DL (ref 33–37)
MCHC RBC AUTO-ENTMCNC: 34.6 GM/DL (ref 33–37)
MCHC RBC AUTO-ENTMCNC: 34.6 GM/DL (ref 33–37)
MCHC RBC AUTO-ENTMCNC: 34.7 GM/DL (ref 33–37)
MCHC RBC AUTO-ENTMCNC: 34.8 GM/DL (ref 33–37)
MCHC RBC AUTO-ENTMCNC: 35.1 GM/DL (ref 33–37)
MCV RBC AUTO: 85.9 FL (ref 81–99)
MCV RBC AUTO: 86.2 FL (ref 81–99)
MCV RBC AUTO: 86.3 FL (ref 81–99)
MCV RBC AUTO: 86.3 FL (ref 81–99)
MCV RBC AUTO: 86.4 FL (ref 81–99)
MCV RBC AUTO: 86.6 FL (ref 81–99)
MCV RBC AUTO: 86.9 FL (ref 81–99)
MCV RBC AUTO: 87.1 FL (ref 81–99)
MCV RBC AUTO: 87.6 FL (ref 81–99)
MCV RBC AUTO: 87.7 FL (ref 81–99)
MCV RBC AUTO: 88.3 FL (ref 81–99)
MCV RBC AUTO: 89 FL (ref 81–99)
MCV RBC AUTO: 89.2 FL (ref 81–99)
MCV RBC AUTO: 89.6 FL (ref 81–99)
MCV RBC AUTO: 90.3 FL (ref 81–99)
MCV RBC AUTO: 90.3 FL (ref 81–99)
MCV RBC AUTO: 90.6 FL (ref 81–99)
MCV RBC AUTO: 91.1 FL (ref 81–99)
MCV RBC AUTO: 91.1 FL (ref 81–99)
MCV RBC AUTO: 91.6 FL (ref 81–99)
MCV RBC AUTO: 92.4 FL (ref 81–99)
MCV RBC AUTO: 92.7 FL (ref 81–99)
MCV RBC AUTO: 92.8 FL (ref 81–99)
MCV RBC AUTO: 93.2 FL (ref 81–99)
MCV RBC AUTO: 94.8 FL (ref 81–99)
MISCELLANEOUS 2: ABNORMAL
MISCELLANEOUS 2: ABNORMAL
MISCELLANEOUS LAB TEST RESULT: ABNORMAL
MODE: ABNORMAL
MODE: AC
MONOCYTES # BLD: 12.2 %
MONOCYTES # BLD: 13 %
MONOCYTES # BLD: 6.8 %
MONOCYTES # BLD: 7 %
MONOCYTES ABSOLUTE: 0.9 THOU/MM3 (ref 0.4–1.3)
MONOCYTES ABSOLUTE: 1.1 THOU/MM3 (ref 0.4–1.3)
MONOCYTES ABSOLUTE: 1.2 THOU/MM3 (ref 0.4–1.3)
MONOCYTES ABSOLUTE: 2.7 THOU/MM3 (ref 0.4–1.3)
MRSA SCREEN RT-PCR: NEGATIVE
MRSA SCREEN RT-PCR: NEGATIVE
MRSA SCREEN: NORMAL
MRSA SCREEN: NORMAL
NITRITE, URINE: NEGATIVE
NUCLEATED RED BLOOD CELLS: 0 /100 WBC
O2 SAT, MIXED: 37 %
O2 SAT, MIXED: 38 %
O2 SAT, MIXED: 44 %
O2 SAT, MIXED: 72 %
O2 SATURATION: 100 %
O2 SATURATION: 81 %
O2 SATURATION: 91 %
O2 SATURATION: 91 %
O2 SATURATION: 93 %
O2 SATURATION: 95 %
O2 SATURATION: 95 %
O2 SATURATION: 96 %
O2 SATURATION: 96 %
O2 SATURATION: 97 %
O2 SATURATION: 98 %
O2 SATURATION: 99 %
ORGANISM: ABNORMAL
ORGANISM: ABNORMAL
OSMOLALITY CALCULATION: 277.6 MOSMOL/KG (ref 275–300)
PATHOLOGIST REVIEW: ABNORMAL
PCO2 (TEMP CORRECTED): 26 (ref 35–45)
PCO2, MIXED VENOUS: 43 MMHG (ref 41–51)
PCO2, MIXED VENOUS: 45 MMHG (ref 41–51)
PCO2, MIXED VENOUS: 46 MMHG (ref 41–51)
PCO2, MIXED VENOUS: 55 MMHG (ref 41–51)
PCO2: 25 MMHG (ref 35–45)
PCO2: 28 MMHG (ref 35–45)
PCO2: 29 MMHG (ref 35–45)
PCO2: 30 MMHG (ref 35–45)
PCO2: 35 MMHG (ref 35–45)
PCO2: 37 MMHG (ref 35–45)
PCO2: 38 MMHG (ref 35–45)
PCO2: 41 MMHG (ref 35–45)
PCO2: 42 MMHG (ref 35–45)
PCO2: 43 MMHG (ref 35–45)
PCO2: 43 MMHG (ref 35–45)
PCO2: 44 MMHG (ref 35–45)
PCO2: 44 MMHG (ref 35–45)
PCO2: 45 MMHG (ref 35–45)
PCO2: 45 MMHG (ref 35–45)
PCO2: 46 MMHG (ref 35–45)
PCO2: 49 MMHG (ref 35–45)
PDW BLD-RTO: 13.7 % (ref 11.5–14.5)
PDW BLD-RTO: 13.7 % (ref 11.5–14.5)
PDW BLD-RTO: 14.2 % (ref 11.5–14.5)
PDW BLD-RTO: 14.3 % (ref 11.5–14.5)
PDW BLD-RTO: 14.4 % (ref 11.5–14.5)
PDW BLD-RTO: 14.5 % (ref 11.5–14.5)
PDW BLD-RTO: 14.6 % (ref 11.5–14.5)
PDW BLD-RTO: 14.7 % (ref 11.5–14.5)
PDW BLD-RTO: 14.8 % (ref 11.5–14.5)
PDW BLD-RTO: 14.9 % (ref 11.5–14.5)
PDW BLD-RTO: 14.9 % (ref 11.5–14.5)
PDW BLD-RTO: 15 % (ref 11.5–14.5)
PDW BLD-RTO: 15 % (ref 11.5–14.5)
PDW BLD-RTO: 15.1 % (ref 11.5–14.5)
PDW BLD-RTO: 15.3 % (ref 11.5–14.5)
PDW BLD-RTO: 15.3 % (ref 11.5–14.5)
PDW BLD-RTO: 15.7 % (ref 11.5–14.5)
PDW BLD-RTO: 15.7 % (ref 11.5–14.5)
PDW BLD-RTO: 16.8 % (ref 11.5–14.5)
PDW BLD-RTO: 17.1 % (ref 11.5–14.5)
PDW BLD-RTO: 17.4 % (ref 11.5–14.5)
PH (TEMPERATURE CORRECTED): 7.31 (ref 7.35–7.45)
PH BLOOD GAS: 7.19 (ref 7.35–7.45)
PH BLOOD GAS: 7.22 (ref 7.35–7.45)
PH BLOOD GAS: 7.32 (ref 7.35–7.45)
PH BLOOD GAS: 7.42 (ref 7.35–7.45)
PH BLOOD GAS: 7.44 (ref 7.35–7.45)
PH BLOOD GAS: 7.45 (ref 7.35–7.45)
PH BLOOD GAS: 7.48 (ref 7.35–7.45)
PH BLOOD GAS: 7.49 (ref 7.35–7.45)
PH BLOOD GAS: 7.5 (ref 7.35–7.45)
PH BLOOD GAS: 7.51 (ref 7.35–7.45)
PH BLOOD GAS: 7.52 (ref 7.35–7.45)
PH BLOOD GAS: 7.54 (ref 7.35–7.45)
PH BLOOD GAS: 7.55 (ref 7.35–7.45)
PH BLOOD GAS: 7.59 (ref 7.35–7.45)
PH UA: 5
PH UA: 6
PH UA: 6
PH UA: 6.5
PH, MIXED: 7.17 (ref 7.31–7.41)
PH, MIXED: 7.35 (ref 7.31–7.41)
PH, MIXED: 7.44 (ref 7.31–7.41)
PH, MIXED: 7.49 (ref 7.31–7.41)
PHOSPHORUS: 1.9 MG/DL (ref 2.4–4.7)
PHOSPHORUS: 2 MG/DL (ref 2.4–4.7)
PHOSPHORUS: 2.1 MG/DL (ref 2.4–4.7)
PHOSPHORUS: 2.3 MG/DL (ref 2.4–4.7)
PHOSPHORUS: 2.5 MG/DL (ref 2.4–4.7)
PHOSPHORUS: 2.7 MG/DL (ref 2.4–4.7)
PHOSPHORUS: 2.8 MG/DL (ref 2.4–4.7)
PHOSPHORUS: 3.1 MG/DL (ref 2.4–4.7)
PHOSPHORUS: 3.4 MG/DL (ref 2.4–4.7)
PHOSPHORUS: 3.5 MG/DL (ref 2.4–4.7)
PHOSPHORUS: 5 MG/DL (ref 2.4–4.7)
PLATELET # BLD: 101 THOU/MM3 (ref 130–400)
PLATELET # BLD: 103 THOU/MM3 (ref 130–400)
PLATELET # BLD: 105 THOU/MM3 (ref 130–400)
PLATELET # BLD: 136 THOU/MM3 (ref 130–400)
PLATELET # BLD: 140 THOU/MM3 (ref 130–400)
PLATELET # BLD: 181 THOU/MM3 (ref 130–400)
PLATELET # BLD: 208 THOU/MM3 (ref 130–400)
PLATELET # BLD: 214 THOU/MM3 (ref 130–400)
PLATELET # BLD: 226 THOU/MM3 (ref 130–400)
PLATELET # BLD: 235 THOU/MM3 (ref 130–400)
PLATELET # BLD: 235 THOU/MM3 (ref 130–400)
PLATELET # BLD: 269 THOU/MM3 (ref 130–400)
PLATELET # BLD: 283 THOU/MM3 (ref 130–400)
PLATELET # BLD: 286 THOU/MM3 (ref 130–400)
PLATELET # BLD: 287 THOU/MM3 (ref 130–400)
PLATELET # BLD: 298 THOU/MM3 (ref 130–400)
PLATELET # BLD: 30 THOU/MM3 (ref 130–400)
PLATELET # BLD: 36 THOU/MM3 (ref 130–400)
PLATELET # BLD: 40 THOU/MM3 (ref 130–400)
PLATELET # BLD: 40 THOU/MM3 (ref 130–400)
PLATELET # BLD: 48 THOU/MM3 (ref 130–400)
PLATELET # BLD: 55 THOU/MM3 (ref 130–400)
PLATELET # BLD: 78 THOU/MM3 (ref 130–400)
PLATELET # BLD: 85 THOU/MM3 (ref 130–400)
PLATELET # BLD: 90 THOU/MM3 (ref 130–400)
PLATELET ESTIMATE: ADEQUATE
PMV BLD AUTO: 7.5 MCM (ref 7.4–10.4)
PMV BLD AUTO: 7.8 MCM (ref 7.4–10.4)
PMV BLD AUTO: 7.8 MCM (ref 7.4–10.4)
PMV BLD AUTO: 8 MCM (ref 7.4–10.4)
PMV BLD AUTO: 8.3 MCM (ref 7.4–10.4)
PMV BLD AUTO: 8.4 MCM (ref 7.4–10.4)
PMV BLD AUTO: 8.5 MCM (ref 7.4–10.4)
PMV BLD AUTO: 8.7 MCM (ref 7.4–10.4)
PMV BLD AUTO: 8.9 MCM (ref 7.4–10.4)
PMV BLD AUTO: 9 MCM (ref 7.4–10.4)
PMV BLD AUTO: 9 MCM (ref 7.4–10.4)
PMV BLD AUTO: 9.1 MCM (ref 7.4–10.4)
PMV BLD AUTO: 9.2 MCM (ref 7.4–10.4)
PMV BLD AUTO: 9.7 MCM (ref 7.4–10.4)
PMV BLD AUTO: 9.8 MCM (ref 7.4–10.4)
PO2 (TEMP CORRECTED): 116 (ref 71–104)
PO2 MIXED: 21 MMHG (ref 25–40)
PO2 MIXED: 26 MMHG (ref 25–40)
PO2 MIXED: 28 MMHG (ref 25–40)
PO2 MIXED: 35 MMHG (ref 25–40)
PO2: 101 MMHG (ref 71–104)
PO2: 102 MMHG (ref 71–104)
PO2: 112 MMHG (ref 71–104)
PO2: 120 MMHG (ref 71–104)
PO2: 200 MMHG (ref 71–104)
PO2: 41 MMHG (ref 71–104)
PO2: 53 MMHG (ref 71–104)
PO2: 59 MMHG (ref 71–104)
PO2: 62 MMHG (ref 71–104)
PO2: 75 MMHG (ref 71–104)
PO2: 78 MMHG (ref 71–104)
PO2: 78 MMHG (ref 71–104)
PO2: 79 MMHG (ref 71–104)
PO2: 93 MMHG (ref 71–104)
PO2: 93 MMHG (ref 71–104)
PO2: 96 MMHG (ref 71–104)
PO2: 98 MMHG (ref 71–104)
POC ACTIVATED CLOTTING TIME KAOLIN: 153 SECONDS (ref 1–150)
POC ACTIVATED CLOTTING TIME KAOLIN: 169 SECONDS (ref 1–150)
POIKILOCYTES: ABNORMAL
POTASSIUM SERPL-SCNC: 3.3 MEQ/L (ref 3.5–5.2)
POTASSIUM SERPL-SCNC: 3.4 MEQ/L (ref 3.5–5.2)
POTASSIUM SERPL-SCNC: 3.4 MEQ/L (ref 3.5–5.2)
POTASSIUM SERPL-SCNC: 3.5 MEQ/L (ref 3.5–5.2)
POTASSIUM SERPL-SCNC: 3.5 MEQ/L (ref 3.5–5.2)
POTASSIUM SERPL-SCNC: 3.6 MEQ/L (ref 3.5–5.2)
POTASSIUM SERPL-SCNC: 3.7 MEQ/L (ref 3.5–5.2)
POTASSIUM SERPL-SCNC: 3.8 MEQ/L (ref 3.5–5.2)
POTASSIUM SERPL-SCNC: 3.9 MEQ/L (ref 3.5–5.2)
POTASSIUM SERPL-SCNC: 4.1 MEQ/L (ref 3.5–5.2)
POTASSIUM SERPL-SCNC: 4.1 MEQ/L (ref 3.5–5.2)
POTASSIUM SERPL-SCNC: 4.2 MEQ/L (ref 3.5–5.2)
POTASSIUM SERPL-SCNC: 4.3 MEQ/L (ref 3.5–5.2)
POTASSIUM SERPL-SCNC: 4.4 MEQ/L (ref 3.5–5.2)
POTASSIUM SERPL-SCNC: 4.6 MEQ/L (ref 3.5–5.2)
POTASSIUM SERPL-SCNC: 4.8 MEQ/L (ref 3.5–5.2)
POTASSIUM SERPL-SCNC: 4.9 MEQ/L (ref 3.5–5.2)
POTASSIUM SERPL-SCNC: 5.1 MEQ/L (ref 3.5–5.2)
POTASSIUM SERPL-SCNC: 5.1 MEQ/L (ref 3.5–5.2)
PROCALCITONIN: 1.81 NG/ML (ref 0.01–0.09)
PROCALCITONIN: 2.04 NG/ML (ref 0.01–0.09)
PROTEIN UA: 100 MG/DL
PROTEIN UA: 30
PROTEIN UA: ABNORMAL MG/DL
PROTEIN UA: NEGATIVE
RBC # BLD: 2.21 MILL/MM3 (ref 4.2–5.4)
RBC # BLD: 2.24 MILL/MM3 (ref 4.2–5.4)
RBC # BLD: 2.37 MILL/MM3 (ref 4.2–5.4)
RBC # BLD: 2.44 MILL/MM3 (ref 4.2–5.4)
RBC # BLD: 2.47 MILL/MM3 (ref 4.2–5.4)
RBC # BLD: 2.49 MILL/MM3 (ref 4.2–5.4)
RBC # BLD: 2.59 MILL/MM3 (ref 4.2–5.4)
RBC # BLD: 2.61 MILL/MM3 (ref 4.2–5.4)
RBC # BLD: 2.61 MILL/MM3 (ref 4.2–5.4)
RBC # BLD: 2.65 MILL/MM3 (ref 4.2–5.4)
RBC # BLD: 2.73 MILL/MM3 (ref 4.2–5.4)
RBC # BLD: 2.75 MILL/MM3 (ref 4.2–5.4)
RBC # BLD: 2.78 MILL/MM3 (ref 4.2–5.4)
RBC # BLD: 2.97 MILL/MM3 (ref 4.2–5.4)
RBC # BLD: 2.99 MILL/MM3 (ref 4.2–5.4)
RBC # BLD: 3 MILL/MM3 (ref 4.2–5.4)
RBC # BLD: 3.02 MILL/MM3 (ref 4.2–5.4)
RBC # BLD: 3.25 MILL/MM3 (ref 4.2–5.4)
RBC # BLD: 3.25 MILL/MM3 (ref 4.2–5.4)
RBC # BLD: 3.4 MILL/MM3 (ref 4.2–5.4)
RBC # BLD: 3.7 MILL/MM3 (ref 4.2–5.4)
RBC # BLD: 3.78 MILL/MM3 (ref 4.2–5.4)
RBC # BLD: 3.8 MILL/MM3 (ref 4.2–5.4)
RBC # BLD: 4.5 MILL/MM3 (ref 4.2–5.4)
RBC # BLD: 4.61 MILL/MM3 (ref 4.2–5.4)
RBC # BLD: ABNORMAL 10*6/UL
RBC # BLD: NORMAL 10*6/UL
RBC URINE: ABNORMAL /HPF
REACTION TIME RAPID TEG: 0.8 MINUTES (ref 0.4–1)
RENAL EPITHELIAL, UA: ABNORMAL
RESPIRATORY CULTURE: NORMAL
RESPIRATORY CULTURE: NORMAL
RH FACTOR: NORMAL
RH FACTOR: NORMAL
SCAN OF BLOOD SMEAR: NORMAL
SCHISTOCYTES: ABNORMAL
SEG NEUTROPHILS: 72.6 %
SEG NEUTROPHILS: 75.5 %
SEG NEUTROPHILS: 83.1 %
SEG NEUTROPHILS: 89 %
SEGMENTED NEUTROPHILS ABSOLUTE COUNT: 14.2 THOU/MM3 (ref 1.8–7.7)
SEGMENTED NEUTROPHILS ABSOLUTE COUNT: 34.3 THOU/MM3 (ref 1.8–7.7)
SEGMENTED NEUTROPHILS ABSOLUTE COUNT: 5.4 THOU/MM3 (ref 1.8–7.7)
SEGMENTED NEUTROPHILS ABSOLUTE COUNT: 6.5 THOU/MM3 (ref 1.8–7.7)
SET PEEP: 5 MMHG
SET PRESS SUPP: 10 CMH2O
SET PRESS SUPP: 5 CMH2O
SET PRESS SUPP: 6 CMH2O
SET RESPIRATORY RATE: 12 BPM
SET RESPIRATORY RATE: 13 BPM
SET RESPIRATORY RATE: 14 BPM
SET RESPIRATORY RATE: 18 BPM
SET RESPIRATORY RATE: 25 BPM
SET TIDAL VOLUME: 400 ML
SET TIDAL VOLUME: 550 ML
SET TIDAL VOLUME: 550 ML
SITE: ABNORMAL
SODIUM BLD-SCNC: 125 MEQ/L (ref 135–145)
SODIUM BLD-SCNC: 125 MEQ/L (ref 135–145)
SODIUM BLD-SCNC: 126 MEQ/L (ref 135–145)
SODIUM BLD-SCNC: 132 MEQ/L (ref 135–145)
SODIUM BLD-SCNC: 135 MEQ/L (ref 135–145)
SODIUM BLD-SCNC: 135 MEQ/L (ref 135–145)
SODIUM BLD-SCNC: 136 MEQ/L (ref 135–145)
SODIUM BLD-SCNC: 137 MEQ/L (ref 135–145)
SODIUM BLD-SCNC: 137 MEQ/L (ref 135–145)
SODIUM BLD-SCNC: 139 MEQ/L (ref 135–145)
SODIUM BLD-SCNC: 139 MEQ/L (ref 135–145)
SODIUM BLD-SCNC: 140 MEQ/L (ref 135–145)
SODIUM BLD-SCNC: 140 MEQ/L (ref 135–145)
SODIUM BLD-SCNC: 141 MEQ/L (ref 135–145)
SODIUM BLD-SCNC: 142 MEQ/L (ref 135–145)
SODIUM BLD-SCNC: 142 MEQ/L (ref 135–145)
SODIUM BLD-SCNC: 143 MEQ/L (ref 135–145)
SODIUM BLD-SCNC: 143 MEQ/L (ref 135–145)
SODIUM BLD-SCNC: 144 MEQ/L (ref 135–145)
SODIUM BLD-SCNC: 145 MEQ/L (ref 135–145)
SODIUM BLD-SCNC: 145 MEQ/L (ref 135–145)
SODIUM BLD-SCNC: 148 MEQ/L (ref 135–145)
SOURCE, BLOOD GAS: ABNORMAL
SPECIFIC GRAVITY UA: 1.02 (ref 1–1.03)
SPECIFIC GRAVITY UA: > 1.03 (ref 1–1.03)
SPECIFIC GRAVITY, URINE: 1.02 (ref 1–1.03)
SPECIFIC GRAVITY, URINE: 1.03 (ref 1–1.03)
TEMPERATURE: 37.7
TOTAL PROTEIN: 3.5 G/DL (ref 6.1–8)
TOTAL PROTEIN: 3.7 G/DL (ref 6.1–8)
TOTAL PROTEIN: 3.8 G/DL (ref 6.1–8)
TOTAL PROTEIN: 4.3 G/DL (ref 6.1–8)
TOTAL PROTEIN: 6.3 G/DL (ref 6.1–8)
TROPONIN T: 0.01 NG/ML
TROPONIN T: 0.08 NG/ML
TROPONIN T: < 0.01 NG/ML
URINE CULTURE, ROUTINE: ABNORMAL
URINE CULTURE, ROUTINE: NORMAL
UROBILINOGEN, URINE: 0.2 EU/DL
UROBILINOGEN, URINE: 1 EU/DL
VANCOMYCIN RANDOM: 8.9 UG/ML (ref 0.1–39.9)
VRE CULTURE: ABNORMAL
VRE CULTURE: NORMAL
WBC # BLD: 11.6 THOU/MM3 (ref 4.8–10.8)
WBC # BLD: 12.5 THOU/MM3 (ref 4.8–10.8)
WBC # BLD: 12.7 THOU/MM3 (ref 4.8–10.8)
WBC # BLD: 12.8 THOU/MM3 (ref 4.8–10.8)
WBC # BLD: 13.2 THOU/MM3 (ref 4.8–10.8)
WBC # BLD: 13.4 THOU/MM3 (ref 4.8–10.8)
WBC # BLD: 13.5 THOU/MM3 (ref 4.8–10.8)
WBC # BLD: 14.4 THOU/MM3 (ref 4.8–10.8)
WBC # BLD: 14.6 THOU/MM3 (ref 4.8–10.8)
WBC # BLD: 15 THOU/MM3 (ref 4.8–10.8)
WBC # BLD: 15.2 THOU/MM3 (ref 4.8–10.8)
WBC # BLD: 15.9 THOU/MM3 (ref 4.8–10.8)
WBC # BLD: 16.2 THOU/MM3 (ref 4.8–10.8)
WBC # BLD: 16.5 THOU/MM3 (ref 4.8–10.8)
WBC # BLD: 17.1 THOU/MM3 (ref 4.8–10.8)
WBC # BLD: 17.1 THOU/MM3 (ref 4.8–10.8)
WBC # BLD: 17.5 THOU/MM3 (ref 4.8–10.8)
WBC # BLD: 19 THOU/MM3 (ref 4.8–10.8)
WBC # BLD: 21.2 THOU/MM3 (ref 4.8–10.8)
WBC # BLD: 38.3 THOU/MM3 (ref 4.8–10.8)
WBC # BLD: 38.5 THOU/MM3 (ref 4.8–10.8)
WBC # BLD: 7.5 THOU/MM3 (ref 4.8–10.8)
WBC # BLD: 7.7 THOU/MM3 (ref 4.8–10.8)
WBC # BLD: 8.6 THOU/MM3 (ref 4.8–10.8)
WBC # BLD: 8.6 THOU/MM3 (ref 4.8–10.8)
WBC UA: ABNORMAL /HPF
YEAST: ABNORMAL

## 2017-01-01 PROCEDURE — 71010 XR CHEST PORTABLE: CPT

## 2017-01-01 PROCEDURE — 2580000003 HC RX 258: Performed by: INTERNAL MEDICINE

## 2017-01-01 PROCEDURE — 1210000002 HC MED SURG R&B - NEUROSCIENCE

## 2017-01-01 PROCEDURE — G8988 SELF CARE GOAL STATUS: HCPCS

## 2017-01-01 PROCEDURE — C1725 CATH, TRANSLUMIN NON-LASER: HCPCS

## 2017-01-01 PROCEDURE — 99232 SBSQ HOSP IP/OBS MODERATE 35: CPT | Performed by: INTERNAL MEDICINE

## 2017-01-01 PROCEDURE — 36415 COLL VENOUS BLD VENIPUNCTURE: CPT

## 2017-01-01 PROCEDURE — 6370000000 HC RX 637 (ALT 250 FOR IP): Performed by: INTERNAL MEDICINE

## 2017-01-01 PROCEDURE — A4649 SURGICAL SUPPLIES: HCPCS

## 2017-01-01 PROCEDURE — 85610 PROTHROMBIN TIME: CPT

## 2017-01-01 PROCEDURE — 99292 CRITICAL CARE ADDL 30 MIN: CPT | Performed by: INTERNAL MEDICINE

## 2017-01-01 PROCEDURE — 84100 ASSAY OF PHOSPHORUS: CPT

## 2017-01-01 PROCEDURE — P9016 RBC LEUKOCYTES REDUCED: HCPCS

## 2017-01-01 PROCEDURE — 85027 COMPLETE CBC AUTOMATED: CPT

## 2017-01-01 PROCEDURE — 81001 URINALYSIS AUTO W/SCOPE: CPT

## 2017-01-01 PROCEDURE — 36680 INSERT NEEDLE BONE CAVITY: CPT

## 2017-01-01 PROCEDURE — 6360000002 HC RX W HCPCS

## 2017-01-01 PROCEDURE — 70450 CT HEAD/BRAIN W/O DYE: CPT

## 2017-01-01 PROCEDURE — 97167 OT EVAL HIGH COMPLEX 60 MIN: CPT

## 2017-01-01 PROCEDURE — 80048 BASIC METABOLIC PNL TOTAL CA: CPT

## 2017-01-01 PROCEDURE — 2500000003 HC RX 250 WO HCPCS: Performed by: PSYCHIATRY & NEUROLOGY

## 2017-01-01 PROCEDURE — 2580000003 HC RX 258: Performed by: PSYCHIATRY & NEUROLOGY

## 2017-01-01 PROCEDURE — 87077 CULTURE AEROBIC IDENTIFY: CPT

## 2017-01-01 PROCEDURE — C1876 STENT, NON-COA/NON-COV W/DEL: HCPCS

## 2017-01-01 PROCEDURE — 2700000000 HC OXYGEN THERAPY PER DAY

## 2017-01-01 PROCEDURE — 36600 WITHDRAWAL OF ARTERIAL BLOOD: CPT

## 2017-01-01 PROCEDURE — 36556 INSERT NON-TUNNEL CV CATH: CPT

## 2017-01-01 PROCEDURE — 72100 X-RAY EXAM L-S SPINE 2/3 VWS: CPT

## 2017-01-01 PROCEDURE — 94640 AIRWAY INHALATION TREATMENT: CPT

## 2017-01-01 PROCEDURE — 6360000002 HC RX W HCPCS: Performed by: INTERNAL MEDICINE

## 2017-01-01 PROCEDURE — 86900 BLOOD TYPING SEROLOGIC ABO: CPT

## 2017-01-01 PROCEDURE — A6212 FOAM DRG <=16 SQ IN W/BORDER: HCPCS

## 2017-01-01 PROCEDURE — S0028 INJECTION, FAMOTIDINE, 20 MG: HCPCS | Performed by: PSYCHIATRY & NEUROLOGY

## 2017-01-01 PROCEDURE — 99291 CRITICAL CARE FIRST HOUR: CPT | Performed by: INTERNAL MEDICINE

## 2017-01-01 PROCEDURE — 80053 COMPREHEN METABOLIC PANEL: CPT

## 2017-01-01 PROCEDURE — 2780000010 HC IMPLANT OTHER

## 2017-01-01 PROCEDURE — C1713 ANCHOR/SCREW BN/BN,TIS/BN: HCPCS

## 2017-01-01 PROCEDURE — 99232 SBSQ HOSP IP/OBS MODERATE 35: CPT | Performed by: NURSE PRACTITIONER

## 2017-01-01 PROCEDURE — 2580000003 HC RX 258

## 2017-01-01 PROCEDURE — 36592 COLLECT BLOOD FROM PICC: CPT

## 2017-01-01 PROCEDURE — 87205 SMEAR GRAM STAIN: CPT

## 2017-01-01 PROCEDURE — 94669 MECHANICAL CHEST WALL OSCILL: CPT

## 2017-01-01 PROCEDURE — 94003 VENT MGMT INPAT SUBQ DAY: CPT

## 2017-01-01 PROCEDURE — S0028 INJECTION, FAMOTIDINE, 20 MG: HCPCS | Performed by: INTERNAL MEDICINE

## 2017-01-01 PROCEDURE — 6360000002 HC RX W HCPCS: Performed by: PSYCHIATRY & NEUROLOGY

## 2017-01-01 PROCEDURE — 83735 ASSAY OF MAGNESIUM: CPT

## 2017-01-01 PROCEDURE — 6370000000 HC RX 637 (ALT 250 FOR IP): Performed by: PSYCHIATRY & NEUROLOGY

## 2017-01-01 PROCEDURE — A4421 OSTOMY SUPPLY MISC: HCPCS

## 2017-01-01 PROCEDURE — 93005 ELECTROCARDIOGRAM TRACING: CPT | Performed by: PHYSICIAN ASSISTANT

## 2017-01-01 PROCEDURE — 87070 CULTURE OTHR SPECIMN AEROBIC: CPT

## 2017-01-01 PROCEDURE — 6360000002 HC RX W HCPCS: Performed by: PHYSICIAN ASSISTANT

## 2017-01-01 PROCEDURE — 99291 CRITICAL CARE FIRST HOUR: CPT | Performed by: PSYCHIATRY & NEUROLOGY

## 2017-01-01 PROCEDURE — 99204 OFFICE O/P NEW MOD 45 MIN: CPT | Performed by: PSYCHIATRY & NEUROLOGY

## 2017-01-01 PROCEDURE — 1290000000 HC SEMI PRIVATE OTHER R&B

## 2017-01-01 PROCEDURE — 97542 WHEELCHAIR MNGMENT TRAINING: CPT

## 2017-01-01 PROCEDURE — 0BH17EZ INSERTION OF ENDOTRACHEAL AIRWAY INTO TRACHEA, VIA NATURAL OR ARTIFICIAL OPENING: ICD-10-PCS | Performed by: PSYCHIATRY & NEUROLOGY

## 2017-01-01 PROCEDURE — 2580000003 HC RX 258: Performed by: PHYSICIAN ASSISTANT

## 2017-01-01 PROCEDURE — 85730 THROMBOPLASTIN TIME PARTIAL: CPT

## 2017-01-01 PROCEDURE — 97530 THERAPEUTIC ACTIVITIES: CPT

## 2017-01-01 PROCEDURE — 82330 ASSAY OF CALCIUM: CPT

## 2017-01-01 PROCEDURE — 86923 COMPATIBILITY TEST ELECTRIC: CPT

## 2017-01-01 PROCEDURE — 82948 REAGENT STRIP/BLOOD GLUCOSE: CPT

## 2017-01-01 PROCEDURE — 84145 PROCALCITONIN (PCT): CPT

## 2017-01-01 PROCEDURE — 94668 MNPJ CHEST WALL SBSQ: CPT

## 2017-01-01 PROCEDURE — 93005 ELECTROCARDIOGRAM TRACING: CPT | Performed by: PSYCHIATRY & NEUROLOGY

## 2017-01-01 PROCEDURE — 2500000003 HC RX 250 WO HCPCS: Performed by: INTERNAL MEDICINE

## 2017-01-01 PROCEDURE — 72170 X-RAY EXAM OF PELVIS: CPT

## 2017-01-01 PROCEDURE — 87641 MR-STAPH DNA AMP PROBE: CPT

## 2017-01-01 PROCEDURE — 97110 THERAPEUTIC EXERCISES: CPT

## 2017-01-01 PROCEDURE — 5A12012 PERFORMANCE OF CARDIAC OUTPUT, SINGLE, MANUAL: ICD-10-PCS | Performed by: PSYCHIATRY & NEUROLOGY

## 2017-01-01 PROCEDURE — 99024 POSTOP FOLLOW-UP VISIT: CPT | Performed by: PSYCHIATRY & NEUROLOGY

## 2017-01-01 PROCEDURE — 97112 NEUROMUSCULAR REEDUCATION: CPT

## 2017-01-01 PROCEDURE — C1894 INTRO/SHEATH, NON-LASER: HCPCS

## 2017-01-01 PROCEDURE — 96360 HYDRATION IV INFUSION INIT: CPT

## 2017-01-01 PROCEDURE — 85018 HEMOGLOBIN: CPT

## 2017-01-01 PROCEDURE — 2500000003 HC RX 250 WO HCPCS

## 2017-01-01 PROCEDURE — 6360000002 HC RX W HCPCS: Performed by: NURSE ANESTHETIST, CERTIFIED REGISTERED

## 2017-01-01 PROCEDURE — 36216 PLACE CATHETER IN ARTERY: CPT

## 2017-01-01 PROCEDURE — 85385 FIBRINOGEN ANTIGEN: CPT

## 2017-01-01 PROCEDURE — P9035 PLATELET PHERES LEUKOREDUCED: HCPCS

## 2017-01-01 PROCEDURE — 99222 1ST HOSP IP/OBS MODERATE 55: CPT | Performed by: PHYSICAL MEDICINE & REHABILITATION

## 2017-01-01 PROCEDURE — 92526 ORAL FUNCTION THERAPY: CPT

## 2017-01-01 PROCEDURE — A6258 TRANSPARENT FILM >16<=48 IN: HCPCS

## 2017-01-01 PROCEDURE — 36430 TRANSFUSION BLD/BLD COMPNT: CPT

## 2017-01-01 PROCEDURE — 5A1D90Z PERFORMANCE OF URINARY FILTRATION, CONTINUOUS, GREATER THAN 18 HOURS PER DAY: ICD-10-PCS | Performed by: INTERNAL MEDICINE

## 2017-01-01 PROCEDURE — 93971 EXTREMITY STUDY: CPT

## 2017-01-01 PROCEDURE — 97116 GAIT TRAINING THERAPY: CPT

## 2017-01-01 PROCEDURE — 97535 SELF CARE MNGMENT TRAINING: CPT

## 2017-01-01 PROCEDURE — 99231 SBSQ HOSP IP/OBS SF/LOW 25: CPT | Performed by: NURSE PRACTITIONER

## 2017-01-01 PROCEDURE — C1769 GUIDE WIRE: HCPCS

## 2017-01-01 PROCEDURE — 82803 BLOOD GASES ANY COMBINATION: CPT

## 2017-01-01 PROCEDURE — 83605 ASSAY OF LACTIC ACID: CPT

## 2017-01-01 PROCEDURE — 2720000010 HC SURG SUPPLY STERILE

## 2017-01-01 PROCEDURE — 82947 ASSAY GLUCOSE BLOOD QUANT: CPT

## 2017-01-01 PROCEDURE — 76937 US GUIDE VASCULAR ACCESS: CPT

## 2017-01-01 PROCEDURE — 97162 PT EVAL MOD COMPLEX 30 MIN: CPT

## 2017-01-01 PROCEDURE — 2580000003 HC RX 258: Performed by: NURSE PRACTITIONER

## 2017-01-01 PROCEDURE — 2500000003 HC RX 250 WO HCPCS: Performed by: PHYSICIAN ASSISTANT

## 2017-01-01 PROCEDURE — 2580000003 HC RX 258: Performed by: FAMILY MEDICINE

## 2017-01-01 PROCEDURE — 82248 BILIRUBIN DIRECT: CPT

## 2017-01-01 PROCEDURE — 87186 SC STD MICRODIL/AGAR DIL: CPT

## 2017-01-01 PROCEDURE — 37799 UNLISTED PX VASCULAR SURGERY: CPT

## 2017-01-01 PROCEDURE — 87086 URINE CULTURE/COLONY COUNT: CPT

## 2017-01-01 PROCEDURE — 92523 SPEECH SOUND LANG COMPREHEN: CPT

## 2017-01-01 PROCEDURE — 84295 ASSAY OF SERUM SODIUM: CPT

## 2017-01-01 PROCEDURE — 37216 TRANSCATH STENT CCA W/O EPS: CPT

## 2017-01-01 PROCEDURE — 2000000000 HC ICU R&B

## 2017-01-01 PROCEDURE — 0DH63UZ INSERTION OF FEEDING DEVICE INTO STOMACH, PERCUTANEOUS APPROACH: ICD-10-PCS | Performed by: RADIOLOGY

## 2017-01-01 PROCEDURE — G8979 MOBILITY GOAL STATUS: HCPCS

## 2017-01-01 PROCEDURE — 87081 CULTURE SCREEN ONLY: CPT

## 2017-01-01 PROCEDURE — 6360000002 HC RX W HCPCS: Performed by: RADIOLOGY

## 2017-01-01 PROCEDURE — 0220000000 HC SKILLED NURSING FACILITY

## 2017-01-01 PROCEDURE — 85025 COMPLETE CBC W/AUTO DIFF WBC: CPT

## 2017-01-01 PROCEDURE — 84132 ASSAY OF SERUM POTASSIUM: CPT

## 2017-01-01 PROCEDURE — 94002 VENT MGMT INPAT INIT DAY: CPT

## 2017-01-01 PROCEDURE — A6446 CONFORM BAND S W>=3" <5"/YD: HCPCS

## 2017-01-01 PROCEDURE — 36222 PLACE CATH CAROTID/INOM ART: CPT | Performed by: PSYCHIATRY & NEUROLOGY

## 2017-01-01 PROCEDURE — 31500 INSERT EMERGENCY AIRWAY: CPT | Performed by: INTERNAL MEDICINE

## 2017-01-01 PROCEDURE — 84484 ASSAY OF TROPONIN QUANT: CPT

## 2017-01-01 PROCEDURE — 86850 RBC ANTIBODY SCREEN: CPT

## 2017-01-01 PROCEDURE — 36218 PLACE CATHETER IN ARTERY: CPT

## 2017-01-01 PROCEDURE — 5A1935Z RESPIRATORY VENTILATION, LESS THAN 24 CONSECUTIVE HOURS: ICD-10-PCS | Performed by: INTERNAL MEDICINE

## 2017-01-01 PROCEDURE — 37215 TRANSCATH STENT CCA W/EPS: CPT | Performed by: PSYCHIATRY & NEUROLOGY

## 2017-01-01 PROCEDURE — G8987 SELF CARE CURRENT STATUS: HCPCS

## 2017-01-01 PROCEDURE — 80202 ASSAY OF VANCOMYCIN: CPT

## 2017-01-01 PROCEDURE — 75984 XRAY CONTROL CATHETER CHANGE: CPT

## 2017-01-01 PROCEDURE — 2500000003 HC RX 250 WO HCPCS: Performed by: NURSE ANESTHETIST, CERTIFIED REGISTERED

## 2017-01-01 PROCEDURE — 90935 HEMODIALYSIS ONE EVALUATION: CPT | Performed by: INTERNAL MEDICINE

## 2017-01-01 PROCEDURE — 6360000004 HC RX CONTRAST MEDICATION: Performed by: RADIOLOGY

## 2017-01-01 PROCEDURE — C1887 CATHETER, GUIDING: HCPCS

## 2017-01-01 PROCEDURE — 74000 XR ABDOMEN LIMITED (KUB): CPT

## 2017-01-01 PROCEDURE — 0BH17EZ INSERTION OF ENDOTRACHEAL AIRWAY INTO TRACHEA, VIA NATURAL OR ARTIFICIAL OPENING: ICD-10-PCS | Performed by: INTERNAL MEDICINE

## 2017-01-01 PROCEDURE — 2060000000 HC ICU INTERMEDIATE R&B

## 2017-01-01 PROCEDURE — 02H633Z INSERTION OF INFUSION DEVICE INTO RIGHT ATRIUM, PERCUTANEOUS APPROACH: ICD-10-PCS | Performed by: RADIOLOGY

## 2017-01-01 PROCEDURE — 99223 1ST HOSP IP/OBS HIGH 75: CPT | Performed by: INTERNAL MEDICINE

## 2017-01-01 PROCEDURE — 77001 FLUOROGUIDE FOR VEIN DEVICE: CPT

## 2017-01-01 PROCEDURE — 3700000001 HC ADD 15 MINUTES (ANESTHESIA)

## 2017-01-01 PROCEDURE — 93306 TTE W/DOPPLER COMPLETE: CPT

## 2017-01-01 PROCEDURE — 7100000000 HC PACU RECOVERY - FIRST 15 MIN

## 2017-01-01 PROCEDURE — 5A1945Z RESPIRATORY VENTILATION, 24-96 CONSECUTIVE HOURS: ICD-10-PCS | Performed by: PSYCHIATRY & NEUROLOGY

## 2017-01-01 PROCEDURE — G8978 MOBILITY CURRENT STATUS: HCPCS

## 2017-01-01 PROCEDURE — 93306 TTE W/DOPPLER COMPLETE: CPT | Performed by: INTERNAL MEDICINE

## 2017-01-01 PROCEDURE — 86901 BLOOD TYPING SEROLOGIC RH(D): CPT

## 2017-01-01 PROCEDURE — 99292 CRITICAL CARE ADDL 30 MIN: CPT | Performed by: PSYCHIATRY & NEUROLOGY

## 2017-01-01 PROCEDURE — 62323 NJX INTERLAMINAR LMBR/SAC: CPT | Performed by: RADIOLOGY

## 2017-01-01 PROCEDURE — 92610 EVALUATE SWALLOWING FUNCTION: CPT

## 2017-01-01 PROCEDURE — 3700000000 HC ANESTHESIA ATTENDED CARE

## 2017-01-01 PROCEDURE — 99283 EMERGENCY DEPT VISIT LOW MDM: CPT

## 2017-01-01 PROCEDURE — 82436 ASSAY OF URINE CHLORIDE: CPT

## 2017-01-01 PROCEDURE — 99306 1ST NF CARE HIGH MDM 50: CPT | Performed by: FAMILY MEDICINE

## 2017-01-01 PROCEDURE — 49440 PLACE GASTROSTOMY TUBE PERC: CPT

## 2017-01-01 PROCEDURE — 97166 OT EVAL MOD COMPLEX 45 MIN: CPT

## 2017-01-01 PROCEDURE — C1751 CATH, INF, PER/CENT/MIDLINE: HCPCS

## 2017-01-01 PROCEDURE — C1760 CLOSURE DEV, VASC: HCPCS

## 2017-01-01 PROCEDURE — 85014 HEMATOCRIT: CPT

## 2017-01-01 PROCEDURE — 93970 EXTREMITY STUDY: CPT

## 2017-01-01 PROCEDURE — C1889 IMPLANT/INSERT DEVICE, NOC: HCPCS

## 2017-01-01 PROCEDURE — 85347 COAGULATION TIME ACTIVATED: CPT

## 2017-01-01 PROCEDURE — 36591 DRAW BLOOD OFF VENOUS DEVICE: CPT

## 2017-01-01 PROCEDURE — 31500 INSERT EMERGENCY AIRWAY: CPT

## 2017-01-01 PROCEDURE — 99308 SBSQ NF CARE LOW MDM 20: CPT | Performed by: FAMILY MEDICINE

## 2017-01-01 PROCEDURE — 99221 1ST HOSP IP/OBS SF/LOW 40: CPT | Performed by: INTERNAL MEDICINE

## 2017-01-01 PROCEDURE — 037L3DZ DILATION OF LEFT INTERNAL CAROTID ARTERY WITH INTRALUMINAL DEVICE, PERCUTANEOUS APPROACH: ICD-10-PCS | Performed by: PSYCHIATRY & NEUROLOGY

## 2017-01-01 PROCEDURE — 93925 LOWER EXTREMITY STUDY: CPT

## 2017-01-01 PROCEDURE — 7100000001 HC PACU RECOVERY - ADDTL 15 MIN

## 2017-01-01 PROCEDURE — 99315 NF DSCHRG MGMT 30 MIN/LESS: CPT | Performed by: FAMILY MEDICINE

## 2017-01-01 PROCEDURE — 94660 CPAP INITIATION&MGMT: CPT

## 2017-01-01 PROCEDURE — 94667 MNPJ CHEST WALL 1ST: CPT

## 2017-01-01 RX ORDER — 0.9 % SODIUM CHLORIDE 0.9 %
250 INTRAVENOUS SOLUTION INTRAVENOUS ONCE
Status: COMPLETED | OUTPATIENT
Start: 2017-01-01 | End: 2017-01-01

## 2017-01-01 RX ORDER — POTASSIUM CHLORIDE 20 MEQ/1
40 TABLET, EXTENDED RELEASE ORAL PRN
Status: CANCELLED | OUTPATIENT
Start: 2017-01-01

## 2017-01-01 RX ORDER — ASPIRIN 81 MG/1
81 TABLET, CHEWABLE ORAL DAILY
Status: DISCONTINUED | OUTPATIENT
Start: 2017-01-01 | End: 2017-01-01

## 2017-01-01 RX ORDER — DEXTROSE MONOHYDRATE 50 MG/ML
INJECTION, SOLUTION INTRAVENOUS CONTINUOUS
Status: DISCONTINUED | OUTPATIENT
Start: 2017-01-01 | End: 2017-01-01

## 2017-01-01 RX ORDER — MORPHINE SULFATE 2 MG/ML
INJECTION, SOLUTION INTRAMUSCULAR; INTRAVENOUS
Status: DISPENSED
Start: 2017-01-01 | End: 2017-01-01

## 2017-01-01 RX ORDER — SODIUM CHLORIDE 9 MG/ML
INJECTION, SOLUTION INTRAVENOUS CONTINUOUS
Status: DISCONTINUED | OUTPATIENT
Start: 2017-01-01 | End: 2017-01-01

## 2017-01-01 RX ORDER — POTASSIUM CHLORIDE 20MEQ/15ML
40 LIQUID (ML) ORAL PRN
Status: DISCONTINUED | OUTPATIENT
Start: 2017-01-01 | End: 2017-01-01 | Stop reason: HOSPADM

## 2017-01-01 RX ORDER — SODIUM CHLORIDE 9 MG/ML
INJECTION, SOLUTION INTRAVENOUS CONTINUOUS
Status: DISCONTINUED | OUTPATIENT
Start: 2017-01-01 | End: 2017-01-01 | Stop reason: HOSPADM

## 2017-01-01 RX ORDER — CHLORHEXIDINE GLUCONATE 0.12 MG/ML
15 RINSE ORAL 4 TIMES DAILY
Status: DISCONTINUED | OUTPATIENT
Start: 2017-01-01 | End: 2017-01-01 | Stop reason: HOSPADM

## 2017-01-01 RX ORDER — MORPHINE SULFATE 10 MG/ML
6 INJECTION, SOLUTION INTRAMUSCULAR; INTRAVENOUS EVERY 4 HOURS PRN
Status: DISCONTINUED | OUTPATIENT
Start: 2017-01-01 | End: 2017-01-01 | Stop reason: HOSPADM

## 2017-01-01 RX ORDER — CHLORHEXIDINE GLUCONATE 0.12 MG/ML
15 RINSE ORAL 4 TIMES DAILY
Status: DISCONTINUED | OUTPATIENT
Start: 2017-01-01 | End: 2017-10-16 | Stop reason: HOSPADM

## 2017-01-01 RX ORDER — OMEPRAZOLE 40 MG/1
40 CAPSULE, DELAYED RELEASE ORAL DAILY
COMMUNITY

## 2017-01-01 RX ORDER — HEPARIN SODIUM 5000 [USP'U]/ML
5000 INJECTION, SOLUTION INTRAVENOUS; SUBCUTANEOUS EVERY 8 HOURS SCHEDULED
Status: DISCONTINUED | OUTPATIENT
Start: 2017-01-01 | End: 2017-01-01 | Stop reason: CLARIF

## 2017-01-01 RX ORDER — IPRATROPIUM BROMIDE AND ALBUTEROL SULFATE 2.5; .5 MG/3ML; MG/3ML
1 SOLUTION RESPIRATORY (INHALATION)
Status: DISCONTINUED | OUTPATIENT
Start: 2017-01-01 | End: 2017-01-01

## 2017-01-01 RX ORDER — ACETAMINOPHEN 325 MG/1
650 TABLET ORAL EVERY 4 HOURS PRN
Status: DISCONTINUED | OUTPATIENT
Start: 2017-01-01 | End: 2017-10-16 | Stop reason: HOSPADM

## 2017-01-01 RX ORDER — ACETAMINOPHEN 325 MG/1
650 TABLET ORAL EVERY 4 HOURS PRN
Status: DISCONTINUED | OUTPATIENT
Start: 2017-01-01 | End: 2017-01-01 | Stop reason: HOSPADM

## 2017-01-01 RX ORDER — LISINOPRIL 5 MG/1
5 TABLET ORAL DAILY
Status: DISCONTINUED | OUTPATIENT
Start: 2017-01-01 | End: 2017-01-01 | Stop reason: HOSPADM

## 2017-01-01 RX ORDER — SODIUM CHLORIDE 450 MG/100ML
INJECTION, SOLUTION INTRAVENOUS CONTINUOUS
Status: CANCELLED | OUTPATIENT
Start: 2017-01-01

## 2017-01-01 RX ORDER — TRAMADOL HYDROCHLORIDE 50 MG/1
100 TABLET ORAL DAILY PRN
Status: ON HOLD | COMMUNITY
End: 2017-01-01 | Stop reason: ALTCHOICE

## 2017-01-01 RX ORDER — FENTANYL CITRATE 50 UG/ML
25 INJECTION, SOLUTION INTRAMUSCULAR; INTRAVENOUS ONCE
Status: COMPLETED | OUTPATIENT
Start: 2017-01-01 | End: 2017-01-01

## 2017-01-01 RX ORDER — ASPIRIN 81 MG/1
81 TABLET, CHEWABLE ORAL DAILY
Status: CANCELLED | OUTPATIENT
Start: 2017-01-01

## 2017-01-01 RX ORDER — CLOPIDOGREL BISULFATE 75 MG/1
75 TABLET ORAL DAILY
Qty: 30 TABLET | Refills: 3 | Status: SHIPPED | OUTPATIENT
Start: 2017-01-01

## 2017-01-01 RX ORDER — ONDANSETRON 2 MG/ML
4 INJECTION INTRAMUSCULAR; INTRAVENOUS EVERY 8 HOURS PRN
Status: DISCONTINUED | OUTPATIENT
Start: 2017-01-01 | End: 2017-01-01 | Stop reason: HOSPADM

## 2017-01-01 RX ORDER — FAMOTIDINE 20 MG/1
10 TABLET, FILM COATED ORAL 2 TIMES DAILY
Status: DISCONTINUED | OUTPATIENT
Start: 2017-01-01 | End: 2017-01-01

## 2017-01-01 RX ORDER — POTASSIUM CHLORIDE 7.45 MG/ML
10 INJECTION INTRAVENOUS PRN
Status: CANCELLED | OUTPATIENT
Start: 2017-01-01

## 2017-01-01 RX ORDER — CLOPIDOGREL BISULFATE 75 MG/1
75 TABLET ORAL DAILY
Status: CANCELLED | OUTPATIENT
Start: 2017-01-01

## 2017-01-01 RX ORDER — FUROSEMIDE 10 MG/ML
INJECTION INTRAMUSCULAR; INTRAVENOUS
Status: DISCONTINUED
Start: 2017-01-01 | End: 2017-01-01

## 2017-01-01 RX ORDER — SODIUM CHLORIDE 0.9 % (FLUSH) 0.9 %
10 SYRINGE (ML) INJECTION 2 TIMES DAILY
Status: DISCONTINUED | OUTPATIENT
Start: 2017-01-01 | End: 2017-10-16 | Stop reason: HOSPADM

## 2017-01-01 RX ORDER — SODIUM CHLORIDE 0.9 % (FLUSH) 0.9 %
10 SYRINGE (ML) INJECTION PRN
Status: DISCONTINUED | OUTPATIENT
Start: 2017-01-01 | End: 2017-01-01 | Stop reason: HOSPADM

## 2017-01-01 RX ORDER — METHYLPREDNISOLONE ACETATE 80 MG/ML
80 INJECTION, SUSPENSION INTRA-ARTICULAR; INTRALESIONAL; INTRAMUSCULAR; SOFT TISSUE ONCE
Status: CANCELLED | OUTPATIENT
Start: 2017-01-01 | End: 2017-01-01

## 2017-01-01 RX ORDER — TRAMADOL HYDROCHLORIDE 50 MG/1
25 TABLET ORAL EVERY 6 HOURS PRN
Qty: 8 TABLET | Refills: 0 | Status: SHIPPED | OUTPATIENT
Start: 2017-01-01 | End: 2017-01-01 | Stop reason: DRUGHIGH

## 2017-01-01 RX ORDER — CLOPIDOGREL BISULFATE 75 MG/1
75 TABLET ORAL DAILY
Status: DISCONTINUED | OUTPATIENT
Start: 2017-01-01 | End: 2017-01-01 | Stop reason: HOSPADM

## 2017-01-01 RX ORDER — ACETYLCYSTEINE 200 MG/ML
600 SOLUTION ORAL; RESPIRATORY (INHALATION) EVERY 8 HOURS
Status: DISCONTINUED | OUTPATIENT
Start: 2017-01-01 | End: 2017-01-01 | Stop reason: ALTCHOICE

## 2017-01-01 RX ORDER — ASPIRIN 81 MG/1
81 TABLET, CHEWABLE ORAL ONCE
Status: DISCONTINUED | OUTPATIENT
Start: 2017-01-01 | End: 2017-01-01 | Stop reason: SDUPTHER

## 2017-01-01 RX ORDER — FAMOTIDINE 20 MG/1
10 TABLET, FILM COATED ORAL 2 TIMES DAILY
Status: CANCELLED | OUTPATIENT
Start: 2017-01-01

## 2017-01-01 RX ORDER — SULFAMETHOXAZOLE AND TRIMETHOPRIM 800; 160 MG/1; MG/1
1 TABLET ORAL EVERY 12 HOURS SCHEDULED
Status: DISCONTINUED | OUTPATIENT
Start: 2017-01-01 | End: 2017-01-01 | Stop reason: ALTCHOICE

## 2017-01-01 RX ORDER — DOPAMINE HYDROCHLORIDE 40 MG/ML
INJECTION INTRAVENOUS CONTINUOUS PRN
Status: DISCONTINUED | OUTPATIENT
Start: 2017-01-01 | End: 2017-01-01 | Stop reason: SDUPTHER

## 2017-01-01 RX ORDER — 0.9 % SODIUM CHLORIDE 0.9 %
500 INTRAVENOUS SOLUTION INTRAVENOUS ONCE
Status: COMPLETED | OUTPATIENT
Start: 2017-01-01 | End: 2017-01-01

## 2017-01-01 RX ORDER — POTASSIUM CHLORIDE 7.45 MG/ML
10 INJECTION INTRAVENOUS
Status: DISPENSED | OUTPATIENT
Start: 2017-01-01 | End: 2017-01-01

## 2017-01-01 RX ORDER — DOBUTAMINE HYDROCHLORIDE 200 MG/100ML
2.5 INJECTION INTRAVENOUS CONTINUOUS
Status: DISCONTINUED | OUTPATIENT
Start: 2017-01-01 | End: 2017-01-01

## 2017-01-01 RX ORDER — BUMETANIDE 0.25 MG/ML
2 INJECTION, SOLUTION INTRAMUSCULAR; INTRAVENOUS ONCE
Status: COMPLETED | OUTPATIENT
Start: 2017-01-01 | End: 2017-01-01

## 2017-01-01 RX ORDER — MIDAZOLAM HYDROCHLORIDE 1 MG/ML
0.5 INJECTION INTRAMUSCULAR; INTRAVENOUS ONCE
Status: COMPLETED | OUTPATIENT
Start: 2017-01-01 | End: 2017-01-01

## 2017-01-01 RX ORDER — ONDANSETRON 2 MG/ML
INJECTION INTRAMUSCULAR; INTRAVENOUS PRN
Status: DISCONTINUED | OUTPATIENT
Start: 2017-01-01 | End: 2017-01-01 | Stop reason: SDUPTHER

## 2017-01-01 RX ORDER — MORPHINE SULFATE 2 MG/ML
0.5 INJECTION, SOLUTION INTRAMUSCULAR; INTRAVENOUS ONCE
Status: COMPLETED | OUTPATIENT
Start: 2017-01-01 | End: 2017-01-01

## 2017-01-01 RX ORDER — POTASSIUM CHLORIDE 7.45 MG/ML
10 INJECTION INTRAVENOUS
Status: COMPLETED | OUTPATIENT
Start: 2017-01-01 | End: 2017-01-01

## 2017-01-01 RX ORDER — DEXTROAMPHETAMINE SACCHARATE, AMPHETAMINE ASPARTATE, DEXTROAMPHETAMINE SULFATE AND AMPHETAMINE SULFATE 1.25; 1.25; 1.25; 1.25 MG/1; MG/1; MG/1; MG/1
5 TABLET ORAL DAILY
Status: DISCONTINUED | OUTPATIENT
Start: 2017-01-01 | End: 2017-10-16 | Stop reason: HOSPADM

## 2017-01-01 RX ORDER — ACETAMINOPHEN 325 MG/1
650 TABLET ORAL EVERY 4 HOURS PRN
Status: DISCONTINUED | OUTPATIENT
Start: 2017-01-01 | End: 2017-01-01

## 2017-01-01 RX ORDER — FUROSEMIDE 10 MG/ML
20 INJECTION INTRAMUSCULAR; INTRAVENOUS ONCE
Status: DISCONTINUED | OUTPATIENT
Start: 2017-01-01 | End: 2017-01-01

## 2017-01-01 RX ORDER — HYDROCODONE BITARTRATE AND ACETAMINOPHEN 5; 325 MG/1; MG/1
1 TABLET ORAL EVERY 4 HOURS PRN
Status: CANCELLED | OUTPATIENT
Start: 2017-01-01

## 2017-01-01 RX ORDER — ATROPINE SULFATE 0.1 MG/ML
INJECTION INTRAVENOUS PRN
Status: DISCONTINUED | OUTPATIENT
Start: 2017-01-01 | End: 2017-01-01 | Stop reason: SDUPTHER

## 2017-01-01 RX ORDER — HYDROCODONE BITARTRATE AND ACETAMINOPHEN 5; 325 MG/1; MG/1
1 TABLET ORAL EVERY 4 HOURS PRN
Status: DISCONTINUED | OUTPATIENT
Start: 2017-01-01 | End: 2017-10-16 | Stop reason: HOSPADM

## 2017-01-01 RX ORDER — CLOPIDOGREL BISULFATE 75 MG/1
75 TABLET ORAL ONCE
Status: DISCONTINUED | OUTPATIENT
Start: 2017-01-01 | End: 2017-01-01 | Stop reason: SDUPTHER

## 2017-01-01 RX ORDER — GLYCOPYRROLATE 0.2 MG/ML
INJECTION INTRAMUSCULAR; INTRAVENOUS PRN
Status: DISCONTINUED | OUTPATIENT
Start: 2017-01-01 | End: 2017-01-01 | Stop reason: SDUPTHER

## 2017-01-01 RX ORDER — FENTANYL CITRATE 50 UG/ML
50 INJECTION, SOLUTION INTRAMUSCULAR; INTRAVENOUS ONCE
Status: CANCELLED | OUTPATIENT
Start: 2017-01-01 | End: 2017-01-01

## 2017-01-01 RX ORDER — DOCUSATE SODIUM 100 MG/1
100 CAPSULE, LIQUID FILLED ORAL DAILY PRN
COMMUNITY

## 2017-01-01 RX ORDER — AMLODIPINE BESYLATE 2.5 MG/1
2.5 TABLET ORAL DAILY
COMMUNITY

## 2017-01-01 RX ORDER — UREA 10 %
3 LOTION (ML) TOPICAL NIGHTLY PRN
Status: DISCONTINUED | OUTPATIENT
Start: 2017-01-01 | End: 2017-10-16 | Stop reason: HOSPADM

## 2017-01-01 RX ORDER — POTASSIUM CHLORIDE 20 MEQ/1
40 TABLET, EXTENDED RELEASE ORAL PRN
Status: DISCONTINUED | OUTPATIENT
Start: 2017-01-01 | End: 2017-01-01 | Stop reason: HOSPADM

## 2017-01-01 RX ORDER — SODIUM CHLORIDE 0.9 % (FLUSH) 0.9 %
10 SYRINGE (ML) INJECTION EVERY 12 HOURS
Status: DISCONTINUED | OUTPATIENT
Start: 2017-01-01 | End: 2017-01-01 | Stop reason: HOSPADM

## 2017-01-01 RX ORDER — 0.9 % SODIUM CHLORIDE 0.9 %
250 INTRAVENOUS SOLUTION INTRAVENOUS ONCE
Status: DISCONTINUED | OUTPATIENT
Start: 2017-01-01 | End: 2017-01-01 | Stop reason: HOSPADM

## 2017-01-01 RX ORDER — ROCURONIUM BROMIDE 10 MG/ML
INJECTION, SOLUTION INTRAVENOUS PRN
Status: DISCONTINUED | OUTPATIENT
Start: 2017-01-01 | End: 2017-01-01 | Stop reason: SDUPTHER

## 2017-01-01 RX ORDER — METHYLPREDNISOLONE ACETATE 80 MG/ML
80 INJECTION, SUSPENSION INTRA-ARTICULAR; INTRALESIONAL; INTRAMUSCULAR; SOFT TISSUE ONCE
Status: COMPLETED | OUTPATIENT
Start: 2017-01-01 | End: 2017-01-01

## 2017-01-01 RX ORDER — BUPIVACAINE HYDROCHLORIDE 2.5 MG/ML
5 INJECTION, SOLUTION EPIDURAL; INFILTRATION; INTRACAUDAL ONCE
Status: CANCELLED | OUTPATIENT
Start: 2017-01-01 | End: 2017-01-01

## 2017-01-01 RX ORDER — POTASSIUM CHLORIDE 7.45 MG/ML
10 INJECTION INTRAVENOUS
Status: CANCELLED | OUTPATIENT
Start: 2017-01-01 | End: 2017-01-01

## 2017-01-01 RX ORDER — BUPIVACAINE HYDROCHLORIDE 2.5 MG/ML
5 INJECTION, SOLUTION EPIDURAL; INFILTRATION; INTRACAUDAL ONCE
Status: DISCONTINUED | OUTPATIENT
Start: 2017-01-01 | End: 2017-01-01

## 2017-01-01 RX ORDER — IPRATROPIUM BROMIDE AND ALBUTEROL SULFATE 2.5; .5 MG/3ML; MG/3ML
1 SOLUTION RESPIRATORY (INHALATION) ONCE
Status: COMPLETED | OUTPATIENT
Start: 2017-01-01 | End: 2017-01-01

## 2017-01-01 RX ORDER — CLOPIDOGREL BISULFATE 75 MG/1
75 TABLET ORAL DAILY
Status: DISCONTINUED | OUTPATIENT
Start: 2017-01-01 | End: 2017-10-16 | Stop reason: HOSPADM

## 2017-01-01 RX ORDER — ANTICOAGULANT CITRATE DEXTROSE SOLUTION FORMULA A 12.25; 11; 3.65 G/500ML; G/500ML; G/500ML
SOLUTION INTRAVENOUS CONTINUOUS
Status: DISCONTINUED | OUTPATIENT
Start: 2017-01-01 | End: 2017-01-01

## 2017-01-01 RX ORDER — POTASSIUM CHLORIDE 20MEQ/15ML
40 LIQUID (ML) ORAL PRN
Status: CANCELLED | OUTPATIENT
Start: 2017-01-01

## 2017-01-01 RX ORDER — POTASSIUM CHLORIDE 7.45 MG/ML
10 INJECTION INTRAVENOUS PRN
Status: DISCONTINUED | OUTPATIENT
Start: 2017-01-01 | End: 2017-01-01 | Stop reason: HOSPADM

## 2017-01-01 RX ORDER — ATROPINE SULFATE 0.1 MG/ML
INJECTION INTRAVENOUS
Status: DISCONTINUED
Start: 2017-01-01 | End: 2017-01-01 | Stop reason: WASHOUT

## 2017-01-01 RX ORDER — CLOPIDOGREL BISULFATE 75 MG/1
150 TABLET ORAL ONCE
Status: DISCONTINUED | OUTPATIENT
Start: 2017-01-01 | End: 2017-01-01 | Stop reason: SDUPTHER

## 2017-01-01 RX ORDER — PROPOFOL 10 MG/ML
10 INJECTION, EMULSION INTRAVENOUS
Status: DISCONTINUED | OUTPATIENT
Start: 2017-01-01 | End: 2017-01-01

## 2017-01-01 RX ORDER — DOPAMINE HYDROCHLORIDE 160 MG/100ML
10 INJECTION, SOLUTION INTRAVENOUS CONTINUOUS
Status: DISCONTINUED | OUTPATIENT
Start: 2017-01-01 | End: 2017-01-01

## 2017-01-01 RX ORDER — AZITHROMYCIN 250 MG/1
250 TABLET, FILM COATED ORAL DAILY
Status: CANCELLED | OUTPATIENT
Start: 2017-01-01 | End: 2017-01-01

## 2017-01-01 RX ORDER — NEOSTIGMINE METHYLSULFATE 1 MG/ML
INJECTION, SOLUTION INTRAVENOUS PRN
Status: DISCONTINUED | OUTPATIENT
Start: 2017-01-01 | End: 2017-01-01 | Stop reason: SDUPTHER

## 2017-01-01 RX ORDER — IPRATROPIUM BROMIDE AND ALBUTEROL SULFATE 2.5; .5 MG/3ML; MG/3ML
1 SOLUTION RESPIRATORY (INHALATION) EVERY 4 HOURS
Status: DISCONTINUED | OUTPATIENT
Start: 2017-01-01 | End: 2017-01-01 | Stop reason: HOSPADM

## 2017-01-01 RX ORDER — MORPHINE SULFATE 10 MG/ML
INJECTION, SOLUTION INTRAMUSCULAR; INTRAVENOUS
Status: DISPENSED
Start: 2017-01-01 | End: 2017-01-01

## 2017-01-01 RX ORDER — POTASSIUM CHLORIDE 29.8 MG/ML
20 INJECTION INTRAVENOUS PRN
Status: DISCONTINUED | OUTPATIENT
Start: 2017-01-01 | End: 2017-01-01 | Stop reason: HOSPADM

## 2017-01-01 RX ORDER — CHLORHEXIDINE GLUCONATE 0.12 MG/ML
15 RINSE ORAL 4 TIMES DAILY
Status: CANCELLED | OUTPATIENT
Start: 2017-01-01

## 2017-01-01 RX ORDER — ACETAMINOPHEN 325 MG/1
650 TABLET ORAL EVERY 4 HOURS PRN
Status: CANCELLED | OUTPATIENT
Start: 2017-01-01

## 2017-01-01 RX ORDER — ACETAMINOPHEN 325 MG/1
650 TABLET ORAL EVERY 6 HOURS PRN
COMMUNITY

## 2017-01-01 RX ORDER — BUPIVACAINE HYDROCHLORIDE 2.5 MG/ML
5 INJECTION, SOLUTION EPIDURAL; INFILTRATION; INTRACAUDAL ONCE
Status: COMPLETED | OUTPATIENT
Start: 2017-01-01 | End: 2017-01-01

## 2017-01-01 RX ORDER — CLOPIDOGREL BISULFATE 75 MG/1
75 TABLET ORAL DAILY
Status: DISCONTINUED | OUTPATIENT
Start: 2017-01-01 | End: 2017-01-01

## 2017-01-01 RX ORDER — LORATADINE 10 MG/1
10 CAPSULE, LIQUID FILLED ORAL DAILY PRN
COMMUNITY

## 2017-01-01 RX ORDER — FENTANYL CITRATE 50 UG/ML
INJECTION, SOLUTION INTRAMUSCULAR; INTRAVENOUS
Status: DISCONTINUED
Start: 2017-01-01 | End: 2017-01-01

## 2017-01-01 RX ORDER — MORPHINE SULFATE 2 MG/ML
1 INJECTION, SOLUTION INTRAMUSCULAR; INTRAVENOUS ONCE
Status: COMPLETED | OUTPATIENT
Start: 2017-01-01 | End: 2017-01-01

## 2017-01-01 RX ORDER — MIDAZOLAM HYDROCHLORIDE 1 MG/ML
1 INJECTION INTRAMUSCULAR; INTRAVENOUS ONCE
Status: CANCELLED | OUTPATIENT
Start: 2017-01-01 | End: 2017-01-01

## 2017-01-01 RX ORDER — POTASSIUM CHLORIDE 20MEQ/15ML
40 LIQUID (ML) ORAL DAILY
Status: DISCONTINUED | OUTPATIENT
Start: 2017-01-01 | End: 2017-01-01

## 2017-01-01 RX ORDER — METHYLPREDNISOLONE ACETATE 80 MG/ML
80 INJECTION, SUSPENSION INTRA-ARTICULAR; INTRALESIONAL; INTRAMUSCULAR; SOFT TISSUE ONCE
Status: DISCONTINUED | OUTPATIENT
Start: 2017-01-01 | End: 2017-01-01

## 2017-01-01 RX ORDER — ASPIRIN 81 MG/1
81 TABLET, CHEWABLE ORAL DAILY
Status: DISCONTINUED | OUTPATIENT
Start: 2017-01-01 | End: 2017-10-16 | Stop reason: HOSPADM

## 2017-01-01 RX ORDER — ACETYLCYSTEINE 200 MG/ML
600 SOLUTION ORAL; RESPIRATORY (INHALATION) 2 TIMES DAILY
Status: DISCONTINUED | OUTPATIENT
Start: 2017-01-01 | End: 2017-01-01

## 2017-01-01 RX ORDER — ATROPINE SULFATE 0.1 MG/ML
INJECTION INTRAVENOUS
Status: DISCONTINUED
Start: 2017-01-01 | End: 2017-01-01

## 2017-01-01 RX ORDER — SODIUM CHLORIDE, SODIUM LACTATE, POTASSIUM CHLORIDE, AND CALCIUM CHLORIDE .6; .31; .03; .02 G/100ML; G/100ML; G/100ML; G/100ML
1000 INJECTION, SOLUTION INTRAVENOUS ONCE
Status: COMPLETED | OUTPATIENT
Start: 2017-01-01 | End: 2017-01-01

## 2017-01-01 RX ORDER — HYDROCODONE BITARTRATE AND ACETAMINOPHEN 5; 325 MG/1; MG/1
1 TABLET ORAL EVERY 4 HOURS PRN
Status: DISCONTINUED | OUTPATIENT
Start: 2017-01-01 | End: 2017-01-01 | Stop reason: HOSPADM

## 2017-01-01 RX ORDER — PHENTOLAMINE MESYLATE 5 MG/1
5 INJECTION INTRAMUSCULAR; INTRAVENOUS ONCE
Status: COMPLETED | OUTPATIENT
Start: 2017-01-01 | End: 2017-01-01

## 2017-01-01 RX ORDER — LIDOCAINE 50 MG/G
1 PATCH TOPICAL DAILY
COMMUNITY

## 2017-01-01 RX ORDER — POTASSIUM CHLORIDE 7.45 MG/ML
INJECTION INTRAVENOUS
Status: DISCONTINUED
Start: 2017-01-01 | End: 2017-01-01 | Stop reason: WASHOUT

## 2017-01-01 RX ORDER — ASPIRIN 81 MG/1
81 TABLET, CHEWABLE ORAL DAILY
COMMUNITY

## 2017-01-01 RX ORDER — ASPIRIN 81 MG/1
81 TABLET, CHEWABLE ORAL DAILY
Status: DISCONTINUED | OUTPATIENT
Start: 2017-01-01 | End: 2017-01-01 | Stop reason: HOSPADM

## 2017-01-01 RX ORDER — NALOXONE HYDROCHLORIDE 1 MG/ML
INJECTION INTRAMUSCULAR; INTRAVENOUS; SUBCUTANEOUS
Status: COMPLETED
Start: 2017-01-01 | End: 2017-01-01

## 2017-01-01 RX ORDER — METOPROLOL SUCCINATE 25 MG/1
25 TABLET, EXTENDED RELEASE ORAL DAILY
COMMUNITY

## 2017-01-01 RX ORDER — DOPAMINE HYDROCHLORIDE 160 MG/100ML
5 INJECTION, SOLUTION INTRAVENOUS CONTINUOUS
Status: DISCONTINUED | OUTPATIENT
Start: 2017-01-01 | End: 2017-01-01

## 2017-01-01 RX ADMIN — ATROPINE SULFATE 1 MG: 0.1 INJECTION, SOLUTION ENDOTRACHEAL; INTRAMUSCULAR; INTRAVENOUS; SUBCUTANEOUS at 13:01

## 2017-01-01 RX ADMIN — Medication 15 ML: at 21:41

## 2017-01-01 RX ADMIN — Medication 15 ML: at 08:44

## 2017-01-01 RX ADMIN — LISINOPRIL 5 MG: 5 TABLET ORAL at 10:38

## 2017-01-01 RX ADMIN — IPRATROPIUM BROMIDE AND ALBUTEROL SULFATE 1 AMPULE: .5; 3 SOLUTION RESPIRATORY (INHALATION) at 05:54

## 2017-01-01 RX ADMIN — Medication 10 ML: at 09:31

## 2017-01-01 RX ADMIN — Medication 10 MCG/MIN: at 00:57

## 2017-01-01 RX ADMIN — Medication 15 ML: at 14:04

## 2017-01-01 RX ADMIN — Medication 15 ML: at 20:04

## 2017-01-01 RX ADMIN — SODIUM CHLORIDE: 9 INJECTION, SOLUTION INTRAVENOUS at 21:52

## 2017-01-01 RX ADMIN — MICONAZOLE NITRATE: 2 POWDER TOPICAL at 09:12

## 2017-01-01 RX ADMIN — Medication 15 ML: at 12:30

## 2017-01-01 RX ADMIN — Medication 15 ML: at 08:26

## 2017-01-01 RX ADMIN — MICONAZOLE NITRATE: 2 POWDER TOPICAL at 08:13

## 2017-01-01 RX ADMIN — POTASSIUM CHLORIDE 20 MEQ: 29.8 INJECTION, SOLUTION INTRAVENOUS at 01:08

## 2017-01-01 RX ADMIN — FAMOTIDINE 10 MG: 10 INJECTION, SOLUTION INTRAVENOUS at 09:15

## 2017-01-01 RX ADMIN — Medication 10 ML: at 09:54

## 2017-01-01 RX ADMIN — MIDAZOLAM 0.5 MG: 1 INJECTION INTRAMUSCULAR; INTRAVENOUS at 11:52

## 2017-01-01 RX ADMIN — PHENYLEPHRINE HYDROCHLORIDE 100 MCG: 10 INJECTION INTRAMUSCULAR; INTRAVENOUS; SUBCUTANEOUS at 12:17

## 2017-01-01 RX ADMIN — SULFAMETHOXAZOLE AND TRIMETHOPRIM 491.2 MG: 80; 16 INJECTION, SOLUTION, CONCENTRATE INTRAVENOUS at 23:27

## 2017-01-01 RX ADMIN — MICONAZOLE NITRATE: 2 POWDER TOPICAL at 22:51

## 2017-01-01 RX ADMIN — METOPROLOL TARTRATE 25 MG: 25 TABLET ORAL at 07:50

## 2017-01-01 RX ADMIN — ANTICOAGULANT CITRATE DEXTROSE SOLUTION FORMULA A: 12.25; 11; 3.65 SOLUTION INTRAVENOUS at 07:31

## 2017-01-01 RX ADMIN — Medication 10 ML: at 12:30

## 2017-01-01 RX ADMIN — SODIUM CHLORIDE: 9 INJECTION, SOLUTION INTRAVENOUS at 11:48

## 2017-01-01 RX ADMIN — IPRATROPIUM BROMIDE AND ALBUTEROL SULFATE 1 AMPULE: .5; 3 SOLUTION RESPIRATORY (INHALATION) at 12:32

## 2017-01-01 RX ADMIN — Medication 15 ML: at 21:05

## 2017-01-01 RX ADMIN — SODIUM CHLORIDE: 9 INJECTION, SOLUTION INTRAVENOUS at 13:48

## 2017-01-01 RX ADMIN — Medication 15 ML: at 13:15

## 2017-01-01 RX ADMIN — MICONAZOLE NITRATE: 2 POWDER TOPICAL at 21:40

## 2017-01-01 RX ADMIN — FAMOTIDINE 10 MG: 10 INJECTION, SOLUTION INTRAVENOUS at 22:11

## 2017-01-01 RX ADMIN — ASPIRIN 81 MG: 81 TABLET, CHEWABLE ORAL at 09:44

## 2017-01-01 RX ADMIN — MICONAZOLE NITRATE: 2 POWDER TOPICAL at 20:09

## 2017-01-01 RX ADMIN — SODIUM BICARBONATE: 84 INJECTION, SOLUTION INTRAVENOUS at 21:55

## 2017-01-01 RX ADMIN — Medication 15 ML: at 22:52

## 2017-01-01 RX ADMIN — IPRATROPIUM BROMIDE AND ALBUTEROL SULFATE 1 AMPULE: .5; 3 SOLUTION RESPIRATORY (INHALATION) at 22:20

## 2017-01-01 RX ADMIN — IPRATROPIUM BROMIDE AND ALBUTEROL SULFATE 1 AMPULE: .5; 3 SOLUTION RESPIRATORY (INHALATION) at 01:48

## 2017-01-01 RX ADMIN — SODIUM CHLORIDE, POTASSIUM CHLORIDE, SODIUM LACTATE AND CALCIUM CHLORIDE 1000 ML: 600; 310; 30; 20 INJECTION, SOLUTION INTRAVENOUS at 19:34

## 2017-01-01 RX ADMIN — DIATRIZOATE MEGLUMINE AND DIATRIZOATE SODIUM 20 ML: 600; 100 SOLUTION ORAL; RECTAL at 12:17

## 2017-01-01 RX ADMIN — MORPHINE SULFATE 6 MG: 10 INJECTION, SOLUTION INTRAMUSCULAR; INTRAVENOUS at 20:26

## 2017-01-01 RX ADMIN — FAMOTIDINE 10 MG: 10 INJECTION, SOLUTION INTRAVENOUS at 21:38

## 2017-01-01 RX ADMIN — ASPIRIN 81 MG: 81 TABLET, CHEWABLE ORAL at 10:39

## 2017-01-01 RX ADMIN — IPRATROPIUM BROMIDE AND ALBUTEROL SULFATE 1 AMPULE: .5; 3 SOLUTION RESPIRATORY (INHALATION) at 08:20

## 2017-01-01 RX ADMIN — MICONAZOLE NITRATE: 2 POWDER TOPICAL at 22:34

## 2017-01-01 RX ADMIN — DEXTROSE MONOHYDRATE: 50 INJECTION, SOLUTION INTRAVENOUS at 05:24

## 2017-01-01 RX ADMIN — SODIUM PHOSPHATE, MONOBASIC, MONOHYDRATE 7.2 MMOL: 276; 142 INJECTION, SOLUTION INTRAVENOUS at 06:18

## 2017-01-01 RX ADMIN — ASPIRIN 81 MG: 81 TABLET, CHEWABLE ORAL at 07:19

## 2017-01-01 RX ADMIN — DEXMEDETOMIDINE HYDROCHLORIDE 24 MCG: 100 INJECTION, SOLUTION INTRAVENOUS at 21:15

## 2017-01-01 RX ADMIN — CALCIUM CHLORIDE 0.5 G: 100 INJECTION, SOLUTION INTRAVENOUS at 23:50

## 2017-01-01 RX ADMIN — PHENYLEPHRINE HYDROCHLORIDE 100 MCG: 10 INJECTION INTRAMUSCULAR; INTRAVENOUS; SUBCUTANEOUS at 12:08

## 2017-01-01 RX ADMIN — ACETYLCYSTEINE 600 MG: 200 SOLUTION ORAL; RESPIRATORY (INHALATION) at 17:58

## 2017-01-01 RX ADMIN — MICONAZOLE NITRATE: 2 POWDER TOPICAL at 08:44

## 2017-01-01 RX ADMIN — METOPROLOL TARTRATE 25 MG: 25 TABLET ORAL at 10:29

## 2017-01-01 RX ADMIN — CLOPIDOGREL 75 MG: 75 TABLET, FILM COATED ORAL at 07:19

## 2017-01-01 RX ADMIN — FAMOTIDINE 10 MG: 10 INJECTION, SOLUTION INTRAVENOUS at 09:16

## 2017-01-01 RX ADMIN — LANSOPRAZOLE 15 MG: 30 TABLET, ORALLY DISINTEGRATING, DELAYED RELEASE ORAL at 07:23

## 2017-01-01 RX ADMIN — Medication 15 ML: at 08:01

## 2017-01-01 RX ADMIN — Medication 15 ML: at 09:00

## 2017-01-01 RX ADMIN — MICONAZOLE NITRATE: 2 POWDER TOPICAL at 20:42

## 2017-01-01 RX ADMIN — NEOSTIGMINE METHYLSULFATE 4 MG: 1 INJECTION, SOLUTION INTRAVENOUS at 13:32

## 2017-01-01 RX ADMIN — Medication 500 ML/HR: at 05:21

## 2017-01-01 RX ADMIN — SODIUM CHLORIDE: 9 INJECTION, SOLUTION INTRAVENOUS at 09:02

## 2017-01-01 RX ADMIN — LANSOPRAZOLE 15 MG: 15 TABLET, ORALLY DISINTEGRATING, DELAYED RELEASE ORAL at 09:13

## 2017-01-01 RX ADMIN — SULFAMETHOXAZOLE AND TRIMETHOPRIM 491.2 MG: 80; 16 INJECTION, SOLUTION, CONCENTRATE INTRAVENOUS at 11:51

## 2017-01-01 RX ADMIN — POTASSIUM CHLORIDE 10 MEQ: 10 INJECTION, SOLUTION INTRAVENOUS at 17:44

## 2017-01-01 RX ADMIN — Medication 15 ML: at 16:57

## 2017-01-01 RX ADMIN — POTASSIUM CHLORIDE 20 MEQ: 29.8 INJECTION, SOLUTION INTRAVENOUS at 05:13

## 2017-01-01 RX ADMIN — IPRATROPIUM BROMIDE AND ALBUTEROL SULFATE 1 AMPULE: .5; 3 SOLUTION RESPIRATORY (INHALATION) at 16:27

## 2017-01-01 RX ADMIN — CHLORHEXIDINE GLUCONATE 15 ML: 1.2 RINSE ORAL at 21:03

## 2017-01-01 RX ADMIN — Medication 15 ML: at 16:50

## 2017-01-01 RX ADMIN — SODIUM BICARBONATE: 84 INJECTION, SOLUTION INTRAVENOUS at 13:36

## 2017-01-01 RX ADMIN — MORPHINE SULFATE 6 MG: 10 INJECTION, SOLUTION INTRAMUSCULAR; INTRAVENOUS at 00:47

## 2017-01-01 RX ADMIN — Medication 15 ML: at 17:32

## 2017-01-01 RX ADMIN — ANTICOAGULANT CITRATE DEXTROSE SOLUTION FORMULA A: 12.25; 11; 3.65 SOLUTION INTRAVENOUS at 01:58

## 2017-01-01 RX ADMIN — POTASSIUM CHLORIDE 10 MEQ: 10 INJECTION, SOLUTION INTRAVENOUS at 12:24

## 2017-01-01 RX ADMIN — Medication 15 ML: at 18:25

## 2017-01-01 RX ADMIN — IPRATROPIUM BROMIDE AND ALBUTEROL SULFATE 1 AMPULE: .5; 3 SOLUTION RESPIRATORY (INHALATION) at 21:36

## 2017-01-01 RX ADMIN — Medication 15 ML: at 10:39

## 2017-01-01 RX ADMIN — Medication 15 ML: at 22:11

## 2017-01-01 RX ADMIN — VANCOMYCIN HYDROCHLORIDE 1000 MG: 1 INJECTION, POWDER, LYOPHILIZED, FOR SOLUTION INTRAVENOUS at 18:27

## 2017-01-01 RX ADMIN — PHENYLEPHRINE HYDROCHLORIDE 100 MCG: 10 INJECTION INTRAMUSCULAR; INTRAVENOUS; SUBCUTANEOUS at 12:00

## 2017-01-01 RX ADMIN — Medication 10 ML: at 09:10

## 2017-01-01 RX ADMIN — Medication 15 ML: at 21:59

## 2017-01-01 RX ADMIN — MORPHINE SULFATE 6 MG: 10 INJECTION, SOLUTION INTRAMUSCULAR; INTRAVENOUS at 10:09

## 2017-01-01 RX ADMIN — MORPHINE SULFATE 0.5 MG: 2 INJECTION, SOLUTION INTRAMUSCULAR; INTRAVENOUS at 20:20

## 2017-01-01 RX ADMIN — SODIUM CHLORIDE 500 ML: 9 INJECTION, SOLUTION INTRAVENOUS at 18:13

## 2017-01-01 RX ADMIN — IPRATROPIUM BROMIDE AND ALBUTEROL SULFATE 1 AMPULE: .5; 3 SOLUTION RESPIRATORY (INHALATION) at 16:25

## 2017-01-01 RX ADMIN — POTASSIUM CHLORIDE 10 MEQ: 10 INJECTION, SOLUTION INTRAVENOUS at 08:57

## 2017-01-01 RX ADMIN — Medication 10 ML: at 20:27

## 2017-01-01 RX ADMIN — Medication 15 ML: at 20:26

## 2017-01-01 RX ADMIN — BUMETANIDE 2 MG: 0.25 INJECTION, SOLUTION INTRAMUSCULAR; INTRAVENOUS at 13:09

## 2017-01-01 RX ADMIN — IPRATROPIUM BROMIDE AND ALBUTEROL SULFATE 1 AMPULE: .5; 3 SOLUTION RESPIRATORY (INHALATION) at 05:29

## 2017-01-01 RX ADMIN — ACETYLCYSTEINE 600 MG: 200 SOLUTION ORAL; RESPIRATORY (INHALATION) at 10:12

## 2017-01-01 RX ADMIN — DOBUTAMINE HYDROCHLORIDE 7.5 MCG/KG/MIN: 200 INJECTION INTRAVENOUS at 23:01

## 2017-01-01 RX ADMIN — AZITHROMYCIN MONOHYDRATE 250 MG: 500 INJECTION, POWDER, LYOPHILIZED, FOR SOLUTION INTRAVENOUS at 20:27

## 2017-01-01 RX ADMIN — MORPHINE SULFATE 6 MG: 10 INJECTION, SOLUTION INTRAMUSCULAR; INTRAVENOUS at 02:30

## 2017-01-01 RX ADMIN — FAMOTIDINE 10 MG: 10 INJECTION, SOLUTION INTRAVENOUS at 00:29

## 2017-01-01 RX ADMIN — Medication 10 ML: at 20:42

## 2017-01-01 RX ADMIN — FAMOTIDINE 10 MG: 10 INJECTION, SOLUTION INTRAVENOUS at 09:00

## 2017-01-01 RX ADMIN — IPRATROPIUM BROMIDE AND ALBUTEROL SULFATE 1 AMPULE: .5; 3 SOLUTION RESPIRATORY (INHALATION) at 12:51

## 2017-01-01 RX ADMIN — Medication 10 ML: at 21:00

## 2017-01-01 RX ADMIN — GLYCOPYRROLATE 0.8 MG: 0.2 INJECTION, SOLUTION INTRAMUSCULAR; INTRAVENOUS at 13:32

## 2017-01-01 RX ADMIN — Medication 15 ML: at 16:18

## 2017-01-01 RX ADMIN — IPRATROPIUM BROMIDE AND ALBUTEROL SULFATE 1 AMPULE: .5; 3 SOLUTION RESPIRATORY (INHALATION) at 16:26

## 2017-01-01 RX ADMIN — IPRATROPIUM BROMIDE AND ALBUTEROL SULFATE 1 AMPULE: .5; 3 SOLUTION RESPIRATORY (INHALATION) at 19:41

## 2017-01-01 RX ADMIN — MORPHINE SULFATE 6 MG: 10 INJECTION, SOLUTION INTRAMUSCULAR; INTRAVENOUS at 08:30

## 2017-01-01 RX ADMIN — MICONAZOLE NITRATE: 2 POWDER TOPICAL at 10:40

## 2017-01-01 RX ADMIN — Medication 15 ML: at 17:43

## 2017-01-01 RX ADMIN — POTASSIUM CHLORIDE 20 MEQ: 29.8 INJECTION, SOLUTION INTRAVENOUS at 06:30

## 2017-01-01 RX ADMIN — SULFAMETHOXAZOLE AND TRIMETHOPRIM 491.2 MG: 80; 16 INJECTION, SOLUTION, CONCENTRATE INTRAVENOUS at 12:02

## 2017-01-01 RX ADMIN — AZITHROMYCIN MONOHYDRATE 250 MG: 500 INJECTION, POWDER, LYOPHILIZED, FOR SOLUTION INTRAVENOUS at 18:44

## 2017-01-01 RX ADMIN — SODIUM CHLORIDE: 9 INJECTION, SOLUTION INTRAVENOUS at 13:24

## 2017-01-01 RX ADMIN — Medication 15 ML: at 16:33

## 2017-01-01 RX ADMIN — PHENYLEPHRINE HYDROCHLORIDE 100 MCG: 10 INJECTION INTRAMUSCULAR; INTRAVENOUS; SUBCUTANEOUS at 11:56

## 2017-01-01 RX ADMIN — Medication 15 ML: at 18:18

## 2017-01-01 RX ADMIN — Medication 10 ML: at 20:30

## 2017-01-01 RX ADMIN — FAMOTIDINE 10 MG: 10 INJECTION, SOLUTION INTRAVENOUS at 21:30

## 2017-01-01 RX ADMIN — METOPROLOL TARTRATE 25 MG: 25 TABLET ORAL at 10:38

## 2017-01-01 RX ADMIN — Medication 10 ML: at 21:30

## 2017-01-01 RX ADMIN — FAMOTIDINE 10 MG: 10 INJECTION, SOLUTION INTRAVENOUS at 20:37

## 2017-01-01 RX ADMIN — EPINEPHRINE 1 MG: 0.1 INJECTION, SOLUTION ENDOTRACHEAL; INTRACARDIAC; INTRAVENOUS at 18:35

## 2017-01-01 RX ADMIN — MICONAZOLE NITRATE: 2 POWDER TOPICAL at 20:04

## 2017-01-01 RX ADMIN — PHENYLEPHRINE HYDROCHLORIDE 100 MCG: 10 INJECTION INTRAMUSCULAR; INTRAVENOUS; SUBCUTANEOUS at 12:58

## 2017-01-01 RX ADMIN — AZITHROMYCIN MONOHYDRATE 250 MG: 500 INJECTION, POWDER, LYOPHILIZED, FOR SOLUTION INTRAVENOUS at 17:22

## 2017-01-01 RX ADMIN — PHENYLEPHRINE HYDROCHLORIDE 100 MCG: 10 INJECTION INTRAMUSCULAR; INTRAVENOUS; SUBCUTANEOUS at 13:00

## 2017-01-01 RX ADMIN — Medication 15 ML: at 16:55

## 2017-01-01 RX ADMIN — Medication 10 ML: at 22:19

## 2017-01-01 RX ADMIN — IPRATROPIUM BROMIDE AND ALBUTEROL SULFATE 1 AMPULE: .5; 3 SOLUTION RESPIRATORY (INHALATION) at 06:41

## 2017-01-01 RX ADMIN — IPRATROPIUM BROMIDE AND ALBUTEROL SULFATE 1 AMPULE: .5; 3 SOLUTION RESPIRATORY (INHALATION) at 12:04

## 2017-01-01 RX ADMIN — ACETYLCYSTEINE 600 MG: 200 SOLUTION ORAL; RESPIRATORY (INHALATION) at 02:12

## 2017-01-01 RX ADMIN — POTASSIUM CHLORIDE 10 MEQ: 10 INJECTION, SOLUTION INTRAVENOUS at 11:40

## 2017-01-01 RX ADMIN — SODIUM CHLORIDE: 9 INJECTION, SOLUTION INTRAVENOUS at 03:36

## 2017-01-01 RX ADMIN — ANTICOAGULANT CITRATE DEXTROSE SOLUTION FORMULA A: 12.25; 11; 3.65 SOLUTION INTRAVENOUS at 06:16

## 2017-01-01 RX ADMIN — Medication 15 ML: at 18:21

## 2017-01-01 RX ADMIN — IPRATROPIUM BROMIDE AND ALBUTEROL SULFATE 1 AMPULE: .5; 3 SOLUTION RESPIRATORY (INHALATION) at 23:59

## 2017-01-01 RX ADMIN — IOHEXOL 1 ML: 180 INJECTION INTRAVENOUS at 15:11

## 2017-01-01 RX ADMIN — Medication 15 ML: at 20:36

## 2017-01-01 RX ADMIN — Medication 500 ML/HR: at 20:30

## 2017-01-01 RX ADMIN — Medication 15 ML: at 09:56

## 2017-01-01 RX ADMIN — Medication 15 ML: at 08:21

## 2017-01-01 RX ADMIN — METOPROLOL TARTRATE 25 MG: 25 TABLET ORAL at 18:56

## 2017-01-01 RX ADMIN — METOPROLOL TARTRATE 25 MG: 25 TABLET ORAL at 13:41

## 2017-01-01 RX ADMIN — IPRATROPIUM BROMIDE AND ALBUTEROL SULFATE 1 AMPULE: .5; 3 SOLUTION RESPIRATORY (INHALATION) at 10:04

## 2017-01-01 RX ADMIN — Medication 10 ML: at 11:46

## 2017-01-01 RX ADMIN — DOBUTAMINE HYDROCHLORIDE 2.5 MCG/KG/MIN: 200 INJECTION INTRAVENOUS at 18:04

## 2017-01-01 RX ADMIN — PROPOFOL 10 MCG/KG/MIN: 10 INJECTION, EMULSION INTRAVENOUS at 19:15

## 2017-01-01 RX ADMIN — Medication 500 ML/HR: at 23:39

## 2017-01-01 RX ADMIN — Medication 10 ML: at 08:45

## 2017-01-01 RX ADMIN — METHYLPREDNISOLONE ACETATE 80 MG: 80 INJECTION, SUSPENSION INTRA-ARTICULAR; INTRALESIONAL; INTRAMUSCULAR; SOFT TISSUE at 15:11

## 2017-01-01 RX ADMIN — DOPAMINE HYDROCHLORIDE IN DEXTROSE 15 MCG/KG/MIN: 1.6 INJECTION, SOLUTION INTRAVENOUS at 15:17

## 2017-01-01 RX ADMIN — IPRATROPIUM BROMIDE AND ALBUTEROL SULFATE 1 AMPULE: .5; 3 SOLUTION RESPIRATORY (INHALATION) at 13:06

## 2017-01-01 RX ADMIN — ERYTHROPOIETIN 1000 UNITS: 2000 INJECTION, SOLUTION INTRAVENOUS; SUBCUTANEOUS at 22:20

## 2017-01-01 RX ADMIN — Medication 10 ML: at 20:25

## 2017-01-01 RX ADMIN — Medication 10 ML: at 20:36

## 2017-01-01 RX ADMIN — ASPIRIN 81 MG: 81 TABLET, CHEWABLE ORAL at 09:13

## 2017-01-01 RX ADMIN — CALCIUM CHLORIDE 0.5 G: 100 INJECTION, SOLUTION INTRAVENOUS at 16:09

## 2017-01-01 RX ADMIN — CEFEPIME HYDROCHLORIDE 2 G: 2 INJECTION, POWDER, FOR SOLUTION INTRAVENOUS at 12:00

## 2017-01-01 RX ADMIN — FAMOTIDINE 10 MG: 10 INJECTION, SOLUTION INTRAVENOUS at 20:26

## 2017-01-01 RX ADMIN — SODIUM CHLORIDE: 9 INJECTION, SOLUTION INTRAVENOUS at 07:14

## 2017-01-01 RX ADMIN — Medication 5 MCG/MIN: at 18:02

## 2017-01-01 RX ADMIN — SULFAMETHOXAZOLE AND TRIMETHOPRIM 491.2 MG: 80; 16 INJECTION, SOLUTION, CONCENTRATE INTRAVENOUS at 01:45

## 2017-01-01 RX ADMIN — IPRATROPIUM BROMIDE AND ALBUTEROL SULFATE 1 AMPULE: .5; 3 SOLUTION RESPIRATORY (INHALATION) at 05:21

## 2017-01-01 RX ADMIN — ERYTHROPOIETIN 1000 UNITS: 2000 INJECTION, SOLUTION INTRAVENOUS; SUBCUTANEOUS at 01:16

## 2017-01-01 RX ADMIN — MICONAZOLE NITRATE: 2 POWDER TOPICAL at 12:00

## 2017-01-01 RX ADMIN — SODIUM PHOSPHATE, MONOBASIC, MONOHYDRATE 5 MMOL: 276; 142 INJECTION, SOLUTION INTRAVENOUS at 14:30

## 2017-01-01 RX ADMIN — IPRATROPIUM BROMIDE AND ALBUTEROL SULFATE 1 AMPULE: .5; 3 SOLUTION RESPIRATORY (INHALATION) at 20:40

## 2017-01-01 RX ADMIN — MICONAZOLE NITRATE: 2 POWDER TOPICAL at 21:02

## 2017-01-01 RX ADMIN — Medication 15 ML: at 12:40

## 2017-01-01 RX ADMIN — Medication 15 ML: at 08:29

## 2017-01-01 RX ADMIN — FAMOTIDINE 10 MG: 10 INJECTION, SOLUTION INTRAVENOUS at 21:29

## 2017-01-01 RX ADMIN — CONIVAPTAN HYDROCHLORIDE 20 MG: 20 INJECTION, SOLUTION INTRAVENOUS at 22:28

## 2017-01-01 RX ADMIN — IPRATROPIUM BROMIDE AND ALBUTEROL SULFATE 1 AMPULE: .5; 3 SOLUTION RESPIRATORY (INHALATION) at 22:48

## 2017-01-01 RX ADMIN — FAMOTIDINE 10 MG: 10 INJECTION, SOLUTION INTRAVENOUS at 08:36

## 2017-01-01 RX ADMIN — Medication 15 ML: at 13:00

## 2017-01-01 RX ADMIN — IPRATROPIUM BROMIDE AND ALBUTEROL SULFATE 1 AMPULE: .5; 3 SOLUTION RESPIRATORY (INHALATION) at 08:07

## 2017-01-01 RX ADMIN — SODIUM CHLORIDE 500 MG: 9 INJECTION, SOLUTION INTRAVENOUS at 21:26

## 2017-01-01 RX ADMIN — Medication 10 ML: at 20:15

## 2017-01-01 RX ADMIN — Medication 10 ML: at 09:04

## 2017-01-01 RX ADMIN — FAMOTIDINE 10 MG: 10 INJECTION, SOLUTION INTRAVENOUS at 20:09

## 2017-01-01 RX ADMIN — MICONAZOLE NITRATE: 2 POWDER TOPICAL at 09:31

## 2017-01-01 RX ADMIN — Medication 15 ML: at 20:42

## 2017-01-01 RX ADMIN — IPRATROPIUM BROMIDE AND ALBUTEROL SULFATE 1 AMPULE: .5; 3 SOLUTION RESPIRATORY (INHALATION) at 02:11

## 2017-01-01 RX ADMIN — IPRATROPIUM BROMIDE AND ALBUTEROL SULFATE 1 AMPULE: .5; 3 SOLUTION RESPIRATORY (INHALATION) at 08:04

## 2017-01-01 RX ADMIN — POTASSIUM CHLORIDE 10 MEQ: 10 INJECTION, SOLUTION INTRAVENOUS at 10:15

## 2017-01-01 RX ADMIN — SODIUM CHLORIDE: 9 INJECTION, SOLUTION INTRAVENOUS at 14:19

## 2017-01-01 RX ADMIN — Medication 15 ML: at 21:29

## 2017-01-01 RX ADMIN — MORPHINE SULFATE 6 MG: 10 INJECTION, SOLUTION INTRAMUSCULAR; INTRAVENOUS at 04:05

## 2017-01-01 RX ADMIN — DEXTROSE MONOHYDRATE: 50 INJECTION, SOLUTION INTRAVENOUS at 08:44

## 2017-01-01 RX ADMIN — Medication 15 ML: at 14:22

## 2017-01-01 RX ADMIN — IPRATROPIUM BROMIDE AND ALBUTEROL SULFATE 1 AMPULE: .5; 3 SOLUTION RESPIRATORY (INHALATION) at 17:40

## 2017-01-01 RX ADMIN — BUPIVACAINE HYDROCHLORIDE 2 ML: 2.5 INJECTION, SOLUTION EPIDURAL; INFILTRATION; INTRACAUDAL at 15:11

## 2017-01-01 RX ADMIN — DEXTROSE MONOHYDRATE: 50 INJECTION, SOLUTION INTRAVENOUS at 07:57

## 2017-01-01 RX ADMIN — SODIUM CHLORIDE 250 ML: 9 INJECTION, SOLUTION INTRAVENOUS at 23:42

## 2017-01-01 RX ADMIN — ACETAMINOPHEN 650 MG: 325 TABLET ORAL at 21:00

## 2017-01-01 RX ADMIN — Medication 15 ML: at 13:27

## 2017-01-01 RX ADMIN — FAMOTIDINE 10 MG: 10 INJECTION, SOLUTION INTRAVENOUS at 00:33

## 2017-01-01 RX ADMIN — Medication 15 ML: at 09:31

## 2017-01-01 RX ADMIN — Medication 10 ML: at 21:01

## 2017-01-01 RX ADMIN — METOPROLOL TARTRATE 25 MG: 25 TABLET ORAL at 22:51

## 2017-01-01 RX ADMIN — ENOXAPARIN SODIUM 30 MG: 30 INJECTION SUBCUTANEOUS at 11:53

## 2017-01-01 RX ADMIN — Medication 10 ML: at 08:33

## 2017-01-01 RX ADMIN — Medication 500 ML/HR: at 20:35

## 2017-01-01 RX ADMIN — AZITHROMYCIN MONOHYDRATE 250 MG: 500 INJECTION, POWDER, LYOPHILIZED, FOR SOLUTION INTRAVENOUS at 18:36

## 2017-01-01 RX ADMIN — POTASSIUM PHOSPHATE, MONOBASIC AND POTASSIUM PHOSPHATE, DIBASIC 9 MMOL: 224; 236 INJECTION, SOLUTION, CONCENTRATE INTRAVENOUS at 02:36

## 2017-01-01 RX ADMIN — DEXMEDETOMIDINE HYDROCHLORIDE 0.4 MCG/KG/HR: 100 INJECTION, SOLUTION INTRAVENOUS at 21:40

## 2017-01-01 RX ADMIN — DEXTROSE MONOHYDRATE: 50 INJECTION, SOLUTION INTRAVENOUS at 04:03

## 2017-01-01 RX ADMIN — MAGNESIUM SULFATE HEPTAHYDRATE 2 G: 500 INJECTION, SOLUTION INTRAMUSCULAR; INTRAVENOUS at 14:30

## 2017-01-01 RX ADMIN — AZITHROMYCIN MONOHYDRATE 250 MG: 500 INJECTION, POWDER, LYOPHILIZED, FOR SOLUTION INTRAVENOUS at 17:10

## 2017-01-01 RX ADMIN — ENOXAPARIN SODIUM 30 MG: 30 INJECTION SUBCUTANEOUS at 12:26

## 2017-01-01 RX ADMIN — POTASSIUM CHLORIDE 20 MEQ: 29.8 INJECTION, SOLUTION INTRAVENOUS at 21:35

## 2017-01-01 RX ADMIN — SODIUM BICARBONATE: 84 INJECTION, SOLUTION INTRAVENOUS at 18:54

## 2017-01-01 RX ADMIN — IPRATROPIUM BROMIDE AND ALBUTEROL SULFATE 1 AMPULE: .5; 3 SOLUTION RESPIRATORY (INHALATION) at 18:27

## 2017-01-01 RX ADMIN — IPRATROPIUM BROMIDE AND ALBUTEROL SULFATE 1 AMPULE: .5; 3 SOLUTION RESPIRATORY (INHALATION) at 08:53

## 2017-01-01 RX ADMIN — PHENTOLAMINE MESYLATE 5 MG: 5 INJECTION, POWDER, FOR SOLUTION INTRAMUSCULAR; INTRAVENOUS at 19:56

## 2017-01-01 RX ADMIN — Medication 10 ML: at 08:22

## 2017-01-01 RX ADMIN — MICONAZOLE NITRATE: 2 POWDER TOPICAL at 09:48

## 2017-01-01 RX ADMIN — Medication 5 ML: at 17:35

## 2017-01-01 RX ADMIN — MICONAZOLE NITRATE: 2 POWDER TOPICAL at 21:05

## 2017-01-01 RX ADMIN — IPRATROPIUM BROMIDE AND ALBUTEROL SULFATE 1 AMPULE: .5; 3 SOLUTION RESPIRATORY (INHALATION) at 22:12

## 2017-01-01 RX ADMIN — CEFEPIME HYDROCHLORIDE 2 G: 2 INJECTION, POWDER, FOR SOLUTION INTRAVENOUS at 22:43

## 2017-01-01 RX ADMIN — NALOXONE HYDROCHLORIDE 2 MG: 1 INJECTION PARENTERAL at 19:05

## 2017-01-01 RX ADMIN — ANTICOAGULANT CITRATE DEXTROSE SOLUTION FORMULA A: 12.25; 11; 3.65 SOLUTION INTRAVENOUS at 00:16

## 2017-01-01 RX ADMIN — DOPAMINE HYDROCHLORIDE 10 MCG/KG/MIN: 40 INJECTION, SOLUTION, CONCENTRATE INTRAVENOUS at 11:34

## 2017-01-01 RX ADMIN — POTASSIUM CHLORIDE 10 MEQ: 10 INJECTION, SOLUTION INTRAVENOUS at 11:20

## 2017-01-01 RX ADMIN — SULFAMETHOXAZOLE AND TRIMETHOPRIM 491.2 MG: 80; 16 INJECTION, SOLUTION, CONCENTRATE INTRAVENOUS at 23:02

## 2017-01-01 RX ADMIN — POTASSIUM CHLORIDE 20 MEQ: 29.8 INJECTION, SOLUTION INTRAVENOUS at 11:19

## 2017-01-01 RX ADMIN — FENTANYL CITRATE 25 MCG: 50 INJECTION INTRAMUSCULAR; INTRAVENOUS at 11:52

## 2017-01-01 RX ADMIN — FAMOTIDINE 10 MG: 10 INJECTION, SOLUTION INTRAVENOUS at 20:24

## 2017-01-01 RX ADMIN — Medication 10 ML: at 09:15

## 2017-01-01 RX ADMIN — ONDANSETRON 4 MG: 2 INJECTION INTRAMUSCULAR; INTRAVENOUS at 12:13

## 2017-01-01 RX ADMIN — FAMOTIDINE 10 MG: 10 INJECTION, SOLUTION INTRAVENOUS at 20:29

## 2017-01-01 RX ADMIN — MICONAZOLE NITRATE: 2 POWDER TOPICAL at 22:11

## 2017-01-01 RX ADMIN — Medication 15 ML: at 09:15

## 2017-01-01 RX ADMIN — FAMOTIDINE 10 MG: 10 INJECTION, SOLUTION INTRAVENOUS at 09:25

## 2017-01-01 RX ADMIN — Medication 500 ML/HR: at 23:40

## 2017-01-01 RX ADMIN — PROPOFOL 70 MG: 10 INJECTION, EMULSION INTRAVENOUS at 11:47

## 2017-01-01 RX ADMIN — Medication 15 ML: at 13:06

## 2017-01-01 RX ADMIN — ANTICOAGULANT CITRATE DEXTROSE SOLUTION FORMULA A: 12.25; 11; 3.65 SOLUTION INTRAVENOUS at 21:17

## 2017-01-01 RX ADMIN — Medication 15 ML: at 20:24

## 2017-01-01 RX ADMIN — ANTICOAGULANT CITRATE DEXTROSE SOLUTION FORMULA A: 12.25; 11; 3.65 SOLUTION INTRAVENOUS at 13:37

## 2017-01-01 RX ADMIN — Medication 10 ML: at 08:59

## 2017-01-01 RX ADMIN — Medication 15 ML: at 09:25

## 2017-01-01 RX ADMIN — DOPAMINE HYDROCHLORIDE IN DEXTROSE 10 MCG/KG/MIN: 1.6 INJECTION, SOLUTION INTRAVENOUS at 19:13

## 2017-01-01 RX ADMIN — Medication 15 ML: at 12:18

## 2017-01-01 RX ADMIN — Medication 15 ML: at 20:58

## 2017-01-01 RX ADMIN — Medication 500 ML/HR: at 23:38

## 2017-01-01 RX ADMIN — Medication 15 ML: at 09:30

## 2017-01-01 RX ADMIN — PHENYLEPHRINE HYDROCHLORIDE 100 MCG: 10 INJECTION INTRAMUSCULAR; INTRAVENOUS; SUBCUTANEOUS at 12:52

## 2017-01-01 RX ADMIN — ANTICOAGULANT CITRATE DEXTROSE SOLUTION FORMULA A: 12.25; 11; 3.65 SOLUTION INTRAVENOUS at 20:31

## 2017-01-01 RX ADMIN — DEXTROAMPHETAMINE SACCHARATE, AMPHETAMINE ASPARTATE, DEXTROAMPHETAMINE SULFATE AND AMPHETAMINE SULFATE 5 MG: 1.25; 1.25; 1.25; 1.25 TABLET ORAL at 11:39

## 2017-01-01 RX ADMIN — DOBUTAMINE HYDROCHLORIDE 2.5 MCG/KG/MIN: 200 INJECTION INTRAVENOUS at 18:23

## 2017-01-01 RX ADMIN — MICONAZOLE NITRATE: 2 POWDER TOPICAL at 09:15

## 2017-01-01 RX ADMIN — Medication 500 ML/HR: at 05:20

## 2017-01-01 RX ADMIN — AZITHROMYCIN MONOHYDRATE 250 MG: 500 INJECTION, POWDER, LYOPHILIZED, FOR SOLUTION INTRAVENOUS at 21:39

## 2017-01-01 RX ADMIN — SODIUM BICARBONATE: 84 INJECTION, SOLUTION INTRAVENOUS at 05:15

## 2017-01-01 RX ADMIN — FAMOTIDINE 10 MG: 10 INJECTION, SOLUTION INTRAVENOUS at 08:44

## 2017-01-01 RX ADMIN — CEFEPIME HYDROCHLORIDE 2 G: 2 INJECTION, POWDER, FOR SOLUTION INTRAVENOUS at 01:12

## 2017-01-01 RX ADMIN — SODIUM CHLORIDE: 9 INJECTION, SOLUTION INTRAVENOUS at 01:29

## 2017-01-01 RX ADMIN — FAMOTIDINE 10 MG: 10 INJECTION, SOLUTION INTRAVENOUS at 22:19

## 2017-01-01 RX ADMIN — CLOPIDOGREL 75 MG: 75 TABLET, FILM COATED ORAL at 10:38

## 2017-01-01 RX ADMIN — IPRATROPIUM BROMIDE AND ALBUTEROL SULFATE 1 AMPULE: .5; 3 SOLUTION RESPIRATORY (INHALATION) at 04:09

## 2017-01-01 RX ADMIN — POTASSIUM CHLORIDE 10 MEQ: 10 INJECTION, SOLUTION INTRAVENOUS at 12:30

## 2017-01-01 RX ADMIN — MORPHINE SULFATE 0.5 MG: 2 INJECTION, SOLUTION INTRAMUSCULAR; INTRAVENOUS at 16:42

## 2017-01-01 RX ADMIN — FAMOTIDINE 10 MG: 10 INJECTION, SOLUTION INTRAVENOUS at 09:31

## 2017-01-01 RX ADMIN — AZITHROMYCIN MONOHYDRATE 250 MG: 500 INJECTION, POWDER, LYOPHILIZED, FOR SOLUTION INTRAVENOUS at 19:54

## 2017-01-01 RX ADMIN — MORPHINE SULFATE 1 MG: 2 INJECTION, SOLUTION INTRAMUSCULAR; INTRAVENOUS at 20:27

## 2017-01-01 RX ADMIN — IPRATROPIUM BROMIDE AND ALBUTEROL SULFATE 1 AMPULE: .5; 3 SOLUTION RESPIRATORY (INHALATION) at 04:57

## 2017-01-01 RX ADMIN — POTASSIUM CHLORIDE 10 MEQ: 10 INJECTION, SOLUTION INTRAVENOUS at 10:04

## 2017-01-01 RX ADMIN — FAMOTIDINE 10 MG: 10 INJECTION, SOLUTION INTRAVENOUS at 11:42

## 2017-01-01 RX ADMIN — IPRATROPIUM BROMIDE AND ALBUTEROL SULFATE 1 AMPULE: .5; 3 SOLUTION RESPIRATORY (INHALATION) at 01:38

## 2017-01-01 RX ADMIN — Medication 500 ML/HR: at 20:31

## 2017-01-01 RX ADMIN — DEXMEDETOMIDINE HYDROCHLORIDE 0.2 MCG/KG/HR: 100 INJECTION, SOLUTION, CONCENTRATE INTRAVENOUS at 22:01

## 2017-01-01 RX ADMIN — MICONAZOLE NITRATE: 2 POWDER TOPICAL at 09:25

## 2017-01-01 RX ADMIN — IPRATROPIUM BROMIDE AND ALBUTEROL SULFATE 1 AMPULE: .5; 3 SOLUTION RESPIRATORY (INHALATION) at 18:36

## 2017-01-01 RX ADMIN — MICONAZOLE NITRATE: 2 POWDER TOPICAL at 09:17

## 2017-01-01 RX ADMIN — Medication 10 ML: at 08:32

## 2017-01-01 RX ADMIN — CLOPIDOGREL 75 MG: 75 TABLET, FILM COATED ORAL at 09:13

## 2017-01-01 RX ADMIN — FAMOTIDINE 10 MG: 10 INJECTION, SOLUTION INTRAVENOUS at 08:32

## 2017-01-01 RX ADMIN — Medication 15 ML: at 21:00

## 2017-01-01 RX ADMIN — FAMOTIDINE 10 MG: 10 INJECTION, SOLUTION INTRAVENOUS at 08:46

## 2017-01-01 RX ADMIN — IPRATROPIUM BROMIDE AND ALBUTEROL SULFATE 1 AMPULE: .5; 3 SOLUTION RESPIRATORY (INHALATION) at 08:38

## 2017-01-01 RX ADMIN — MICONAZOLE NITRATE: 2 POWDER TOPICAL at 20:26

## 2017-01-01 RX ADMIN — POTASSIUM CHLORIDE 10 MEQ: 10 INJECTION, SOLUTION INTRAVENOUS at 12:49

## 2017-01-01 RX ADMIN — Medication 15 ML: at 11:42

## 2017-01-01 RX ADMIN — MORPHINE SULFATE 6 MG: 10 INJECTION, SOLUTION INTRAMUSCULAR; INTRAVENOUS at 22:36

## 2017-01-01 RX ADMIN — POTASSIUM CHLORIDE 10 MEQ: 10 INJECTION, SOLUTION INTRAVENOUS at 13:35

## 2017-01-01 RX ADMIN — SODIUM CHLORIDE: 9 INJECTION, SOLUTION INTRAVENOUS at 08:57

## 2017-01-01 RX ADMIN — PHENYLEPHRINE HYDROCHLORIDE 100 MCG: 10 INJECTION INTRAMUSCULAR; INTRAVENOUS; SUBCUTANEOUS at 11:58

## 2017-01-01 RX ADMIN — MICONAZOLE NITRATE: 2 POWDER TOPICAL at 08:01

## 2017-01-01 RX ADMIN — Medication 5 UNITS: at 09:29

## 2017-01-01 RX ADMIN — Medication 500 ML/HR: at 05:30

## 2017-01-01 RX ADMIN — CALCIUM CHLORIDE 8 G: 100 INJECTION, SOLUTION INTRAVENOUS at 16:20

## 2017-01-01 RX ADMIN — FAMOTIDINE 10 MG: 10 INJECTION, SOLUTION INTRAVENOUS at 22:04

## 2017-01-01 RX ADMIN — MAGNESIUM SULFATE HEPTAHYDRATE 2 G: 500 INJECTION, SOLUTION INTRAMUSCULAR; INTRAVENOUS at 09:49

## 2017-01-01 RX ADMIN — Medication 15 ML: at 20:09

## 2017-01-01 RX ADMIN — VANCOMYCIN HYDROCHLORIDE 750 MG: 1 INJECTION, POWDER, LYOPHILIZED, FOR SOLUTION INTRAVENOUS at 23:54

## 2017-01-01 RX ADMIN — DOPAMINE HYDROCHLORIDE IN DEXTROSE 25 MCG/KG/MIN: 1.6 INJECTION, SOLUTION INTRAVENOUS at 18:15

## 2017-01-01 RX ADMIN — SODIUM BICARBONATE: 84 INJECTION, SOLUTION INTRAVENOUS at 06:53

## 2017-01-01 RX ADMIN — Medication 15 ML: at 08:58

## 2017-01-01 RX ADMIN — CEFEPIME HYDROCHLORIDE 2 G: 2 INJECTION, POWDER, FOR SOLUTION INTRAVENOUS at 01:45

## 2017-01-01 RX ADMIN — IPRATROPIUM BROMIDE AND ALBUTEROL SULFATE 1 AMPULE: .5; 3 SOLUTION RESPIRATORY (INHALATION) at 19:38

## 2017-01-01 RX ADMIN — CEFEPIME 500 MG: 1 INJECTION, POWDER, FOR SOLUTION INTRAMUSCULAR; INTRAVENOUS at 11:29

## 2017-01-01 RX ADMIN — METOPROLOL TARTRATE 25 MG: 25 TABLET ORAL at 22:34

## 2017-01-01 RX ADMIN — FAMOTIDINE 10 MG: 10 INJECTION, SOLUTION INTRAVENOUS at 09:54

## 2017-01-01 RX ADMIN — Medication 50 MG: at 11:47

## 2017-01-01 RX ADMIN — MORPHINE SULFATE 6 MG: 10 INJECTION, SOLUTION INTRAMUSCULAR; INTRAVENOUS at 16:40

## 2017-01-01 RX ADMIN — SODIUM CHLORIDE 500 ML: 9 INJECTION, SOLUTION INTRAVENOUS at 13:52

## 2017-01-01 RX ADMIN — IPRATROPIUM BROMIDE AND ALBUTEROL SULFATE 1 AMPULE: .5; 3 SOLUTION RESPIRATORY (INHALATION) at 13:05

## 2017-01-01 RX ADMIN — IPRATROPIUM BROMIDE AND ALBUTEROL SULFATE 1 AMPULE: .5; 3 SOLUTION RESPIRATORY (INHALATION) at 15:30

## 2017-01-01 RX ADMIN — ANTICOAGULANT CITRATE DEXTROSE SOLUTION FORMULA A: 12.25; 11; 3.65 SOLUTION INTRAVENOUS at 03:05

## 2017-01-01 RX ADMIN — DEXMEDETOMIDINE HYDROCHLORIDE 0.4 MCG/KG/HR: 100 INJECTION, SOLUTION INTRAVENOUS at 16:43

## 2017-01-01 RX ADMIN — POTASSIUM CHLORIDE 20 MEQ: 29.8 INJECTION, SOLUTION INTRAVENOUS at 22:42

## 2017-01-01 RX ADMIN — SODIUM CHLORIDE 250 ML: 9 INJECTION, SOLUTION INTRAVENOUS at 19:19

## 2017-01-01 RX ADMIN — Medication 10 ML: at 04:33

## 2017-01-01 RX ADMIN — Medication 15 ML: at 09:16

## 2017-01-01 RX ADMIN — SODIUM CHLORIDE 250 ML: 9 INJECTION, SOLUTION INTRAVENOUS at 19:20

## 2017-01-01 RX ADMIN — SODIUM CHLORIDE 250 ML: 9 INJECTION, SOLUTION INTRAVENOUS at 22:37

## 2017-01-01 RX ADMIN — Medication 15 ML: at 13:10

## 2017-01-01 RX ADMIN — MICONAZOLE NITRATE: 2 POWDER TOPICAL at 21:00

## 2017-01-01 RX ADMIN — SODIUM BICARBONATE: 84 INJECTION, SOLUTION INTRAVENOUS at 22:32

## 2017-01-01 RX ADMIN — ERYTHROPOIETIN 1000 UNITS: 2000 INJECTION, SOLUTION INTRAVENOUS; SUBCUTANEOUS at 08:46

## 2017-01-01 RX ADMIN — HYDROCODONE BITARTRATE AND ACETAMINOPHEN 1 TABLET: 5; 325 TABLET ORAL at 08:01

## 2017-01-01 RX ADMIN — CEFEPIME 500 MG: 1 INJECTION, POWDER, FOR SOLUTION INTRAMUSCULAR; INTRAVENOUS at 15:37

## 2017-01-01 RX ADMIN — IPRATROPIUM BROMIDE AND ALBUTEROL SULFATE 1 AMPULE: .5; 3 SOLUTION RESPIRATORY (INHALATION) at 23:47

## 2017-01-01 RX ADMIN — CLOPIDOGREL 75 MG: 75 TABLET, FILM COATED ORAL at 09:44

## 2017-01-01 RX ADMIN — AZITHROMYCIN MONOHYDRATE 250 MG: 500 INJECTION, POWDER, LYOPHILIZED, FOR SOLUTION INTRAVENOUS at 18:43

## 2017-01-01 RX ADMIN — CALCIUM CHLORIDE 8 G: 100 INJECTION, SOLUTION INTRAVENOUS at 21:15

## 2017-01-01 RX ADMIN — SODIUM CHLORIDE: 9 INJECTION, SOLUTION INTRAVENOUS at 12:36

## 2017-01-01 RX ADMIN — MORPHINE SULFATE 6 MG: 10 INJECTION, SOLUTION INTRAMUSCULAR; INTRAVENOUS at 22:26

## 2017-01-01 RX ADMIN — VANCOMYCIN HYDROCHLORIDE 750 MG: 5 INJECTION, POWDER, LYOPHILIZED, FOR SOLUTION INTRAVENOUS at 21:32

## 2017-01-01 RX ADMIN — LISINOPRIL 5 MG: 5 TABLET ORAL at 13:44

## 2017-01-01 RX ADMIN — Medication 15 ML: at 20:27

## 2017-01-01 RX ADMIN — FAMOTIDINE 10 MG: 10 INJECTION, SOLUTION INTRAVENOUS at 08:22

## 2017-01-01 RX ADMIN — IPRATROPIUM BROMIDE AND ALBUTEROL SULFATE 1 AMPULE: .5; 3 SOLUTION RESPIRATORY (INHALATION) at 04:43

## 2017-01-01 RX ADMIN — MORPHINE SULFATE 1 MG: 2 INJECTION, SOLUTION INTRAMUSCULAR; INTRAVENOUS at 17:31

## 2017-01-01 RX ADMIN — Medication 10 ML: at 20:58

## 2017-01-01 RX ADMIN — HYDROCODONE BITARTRATE AND ACETAMINOPHEN 1 TABLET: 5; 325 TABLET ORAL at 03:20

## 2017-01-01 RX ADMIN — POTASSIUM CHLORIDE 10 MEQ: 10 INJECTION, SOLUTION INTRAVENOUS at 15:50

## 2017-01-01 ASSESSMENT — PAIN SCALES - GENERAL
PAINLEVEL_OUTOF10: 0
PAINLEVEL_OUTOF10: 2
PAINLEVEL_OUTOF10: 10
PAINLEVEL_OUTOF10: 0
PAINLEVEL_OUTOF10: 10
PAINLEVEL_OUTOF10: 0
PAINLEVEL_OUTOF10: 0
PAINLEVEL_OUTOF10: 4
PAINLEVEL_OUTOF10: 4
PAINLEVEL_OUTOF10: 0
PAINLEVEL_OUTOF10: 0
PAINLEVEL_OUTOF10: 10
PAINLEVEL_OUTOF10: 0
PAINLEVEL_OUTOF10: 6
PAINLEVEL_OUTOF10: 6
PAINLEVEL_OUTOF10: 0
PAINLEVEL_OUTOF10: 5
PAINLEVEL_OUTOF10: 0
PAINLEVEL_OUTOF10: 6
PAINLEVEL_OUTOF10: 0
PAINLEVEL_OUTOF10: 5
PAINLEVEL_OUTOF10: 0
PAINLEVEL_OUTOF10: 0
PAINLEVEL_OUTOF10: 6
PAINLEVEL_OUTOF10: 4
PAINLEVEL_OUTOF10: 0
PAINLEVEL_OUTOF10: 10
PAINLEVEL_OUTOF10: 0
PAINLEVEL_OUTOF10: 5
PAINLEVEL_OUTOF10: 0
PAINLEVEL_OUTOF10: 2
PAINLEVEL_OUTOF10: 5
PAINLEVEL_OUTOF10: 0
PAINLEVEL_OUTOF10: 6
PAINLEVEL_OUTOF10: 0
PAINLEVEL_OUTOF10: 6
PAINLEVEL_OUTOF10: 0

## 2017-01-01 ASSESSMENT — PULMONARY FUNCTION TESTS
PIF_VALUE: 8
PIF_VALUE: 8
PIF_VALUE: 15
PIF_VALUE: 17
PIF_VALUE: 24
PIF_VALUE: 18
PIF_VALUE: 17
PIF_VALUE: 17
PIF_VALUE: 8
PIF_VALUE: 2
PIF_VALUE: 17
PIF_VALUE: 8
PIF_VALUE: 14
PIF_VALUE: 5
PIF_VALUE: 17
PIF_VALUE: 7
PIF_VALUE: 8
PIF_VALUE: 8
PIF_VALUE: 22
PIF_VALUE: 8
PIF_VALUE: 0
PIF_VALUE: 17
PIF_VALUE: 17
PIF_VALUE: 14
PIF_VALUE: 17
PIF_VALUE: 14
PIF_VALUE: 8
PIF_VALUE: 3
PIF_VALUE: 9
PIF_VALUE: 24
PIF_VALUE: 14
PIF_VALUE: 1
PIF_VALUE: 8
PIF_VALUE: 17
PIF_VALUE: 15
PIF_VALUE: 25
PIF_VALUE: 14
PIF_VALUE: 8
PIF_VALUE: 14
PIF_VALUE: 24
PIF_VALUE: 14
PIF_VALUE: 8
PIF_VALUE: 8
PIF_VALUE: 1
PIF_VALUE: 8
PIF_VALUE: 8
PIF_VALUE: 16
PIF_VALUE: 9
PIF_VALUE: 1
PIF_VALUE: 4
PIF_VALUE: 15
PIF_VALUE: 8
PIF_VALUE: 25
PIF_VALUE: 8
PIF_VALUE: 7
PIF_VALUE: 16
PIF_VALUE: 14
PIF_VALUE: 9
PIF_VALUE: 12
PIF_VALUE: 21
PIF_VALUE: 8
PIF_VALUE: 17
PIF_VALUE: 7
PIF_VALUE: 21
PIF_VALUE: 7
PIF_VALUE: 17
PIF_VALUE: 3
PIF_VALUE: 17
PIF_VALUE: 7
PIF_VALUE: 2
PIF_VALUE: 8
PIF_VALUE: 7
PIF_VALUE: 2
PIF_VALUE: 23
PIF_VALUE: 8
PIF_VALUE: 8
PIF_VALUE: 17
PIF_VALUE: 14
PIF_VALUE: 8
PIF_VALUE: 13
PIF_VALUE: 17
PIF_VALUE: 8
PIF_VALUE: 6
PIF_VALUE: 8
PIF_VALUE: 8
PIF_VALUE: 1
PIF_VALUE: 22
PIF_VALUE: 4
PIF_VALUE: 8
PIF_VALUE: 17
PIF_VALUE: 7
PIF_VALUE: 17
PIF_VALUE: 18
PIF_VALUE: 17
PIF_VALUE: 22
PIF_VALUE: 17
PIF_VALUE: 18
PIF_VALUE: 23
PIF_VALUE: 8
PIF_VALUE: 7
PIF_VALUE: 8
PIF_VALUE: 20
PIF_VALUE: 8
PIF_VALUE: 25
PIF_VALUE: 8
PIF_VALUE: 18
PIF_VALUE: 1
PIF_VALUE: 13
PIF_VALUE: 22
PIF_VALUE: 0
PIF_VALUE: 8
PIF_VALUE: 17
PIF_VALUE: 9
PIF_VALUE: 8
PIF_VALUE: 15
PIF_VALUE: 14
PIF_VALUE: 21
PIF_VALUE: 8
PIF_VALUE: 7
PIF_VALUE: 8
PIF_VALUE: 8
PIF_VALUE: 16
PIF_VALUE: 3
PIF_VALUE: 8
PIF_VALUE: 17
PIF_VALUE: 1
PIF_VALUE: 9
PIF_VALUE: 8
PIF_VALUE: 11
PIF_VALUE: 21
PIF_VALUE: 17
PIF_VALUE: 17
PIF_VALUE: 14
PIF_VALUE: 1
PIF_VALUE: 22
PIF_VALUE: 8
PIF_VALUE: 3
PIF_VALUE: 8
PIF_VALUE: 14
PIF_VALUE: 2
PIF_VALUE: 21
PIF_VALUE: 17
PIF_VALUE: 18
PIF_VALUE: 8
PIF_VALUE: 21
PIF_VALUE: 8
PIF_VALUE: 1
PIF_VALUE: 8
PIF_VALUE: 22
PIF_VALUE: 8
PIF_VALUE: 1
PIF_VALUE: 12
PIF_VALUE: 17
PIF_VALUE: 8
PIF_VALUE: 2
PIF_VALUE: 17
PIF_VALUE: 0
PIF_VALUE: 0
PIF_VALUE: 8

## 2017-01-01 ASSESSMENT — PAIN SCALES - WONG BAKER
WONGBAKER_NUMERICALRESPONSE: 4
WONGBAKER_NUMERICALRESPONSE: 0
WONGBAKER_NUMERICALRESPONSE: 4
WONGBAKER_NUMERICALRESPONSE: 8
WONGBAKER_NUMERICALRESPONSE: 4
WONGBAKER_NUMERICALRESPONSE: 0
WONGBAKER_NUMERICALRESPONSE: 4
WONGBAKER_NUMERICALRESPONSE: 0
WONGBAKER_NUMERICALRESPONSE: 0
WONGBAKER_NUMERICALRESPONSE: 4
WONGBAKER_NUMERICALRESPONSE: 0
WONGBAKER_NUMERICALRESPONSE: 4
WONGBAKER_NUMERICALRESPONSE: 4
WONGBAKER_NUMERICALRESPONSE: 0
WONGBAKER_NUMERICALRESPONSE: 0

## 2017-01-01 ASSESSMENT — PAIN DESCRIPTION - DESCRIPTORS
DESCRIPTORS: SPASM
DESCRIPTORS: SPASM
DESCRIPTORS: CONSTANT
DESCRIPTORS: CONSTANT

## 2017-01-01 ASSESSMENT — PAIN DESCRIPTION - ORIENTATION
ORIENTATION: LOWER;RIGHT;LEFT
ORIENTATION: POSTERIOR
ORIENTATION: LEFT
ORIENTATION: POSTERIOR
ORIENTATION: LEFT
ORIENTATION: RIGHT;LEFT;LOWER

## 2017-01-01 ASSESSMENT — PAIN - FUNCTIONAL ASSESSMENT: PAIN_FUNCTIONAL_ASSESSMENT: 0-10

## 2017-01-01 ASSESSMENT — PAIN DESCRIPTION - LOCATION
LOCATION: NECK
LOCATION: BACK
LOCATION: BACK;LEG
LOCATION: NECK
LOCATION: LEG
LOCATION: BACK;LEG

## 2017-01-01 ASSESSMENT — ENCOUNTER SYMPTOMS
ABDOMINAL PAIN: 0
SHORTNESS OF BREATH: 0
NAUSEA: 0
VOMITING: 0
COUGH: 0
BACK PAIN: 1

## 2017-01-01 ASSESSMENT — PAIN DESCRIPTION - ONSET
ONSET: PROGRESSIVE
ONSET: ON-GOING
ONSET: ON-GOING

## 2017-01-01 ASSESSMENT — PAIN DESCRIPTION - PAIN TYPE
TYPE: CHRONIC PAIN
TYPE: CHRONIC PAIN
TYPE: ACUTE PAIN
TYPE: CHRONIC PAIN
TYPE: ACUTE PAIN
TYPE: ACUTE PAIN
TYPE: CHRONIC PAIN

## 2017-01-01 ASSESSMENT — PAIN DESCRIPTION - FREQUENCY
FREQUENCY: CONTINUOUS
FREQUENCY: INTERMITTENT
FREQUENCY: CONTINUOUS

## 2017-01-01 ASSESSMENT — PAIN SCALES - PAIN ASSESSMENT IN ADVANCED DEMENTIA (PAINAD)
FACIALEXPRESSION: 2
CONSOLABILITY: 2
TOTALSCORE: 6
BREATHING: 2
NEGVOCALIZATION: 0
TOTALSCORE: 6
BODYLANGUAGE: 0
BODYLANGUAGE: 1
CONSOLABILITY: 2
NEGVOCALIZATION: 0
BREATHING: 1
FACIALEXPRESSION: 2
TOTALSCORE: 2
NEGVOCALIZATION: 0
BREATHING: 1
BODYLANGUAGE: 1
CONSOLABILITY: 0
FACIALEXPRESSION: 0

## 2017-01-01 ASSESSMENT — PAIN DESCRIPTION - PROGRESSION
CLINICAL_PROGRESSION: GRADUALLY WORSENING
CLINICAL_PROGRESSION: GRADUALLY WORSENING

## 2017-09-26 PROBLEM — I50.32 CHRONIC CONGESTIVE HEART FAILURE WITH LEFT VENTRICULAR DIASTOLIC DYSFUNCTION (HCC): Status: ACTIVE | Noted: 2017-01-01

## 2017-09-26 PROBLEM — G93.40 ENCEPHALOPATHY: Status: ACTIVE | Noted: 2017-01-01

## 2017-09-26 PROBLEM — G93.40 ENCEPHALOPATHY: Status: RESOLVED | Noted: 2017-01-01 | Resolved: 2017-01-01

## 2017-09-26 NOTE — PROGRESS NOTES
Assessment/ Plan   ?cardiac arrest      Supporting Data    Called emergently to bedside to evaluate patient. I saw and examined patient  I discussed case with ICU team (RN, RT) caring for patient  I reviewed the chart, VSS, and labs. [address] Problems    Diagnosis Date Noted    Chronic congestive heart failure with left ventricular diastolic dysfunction (HCC) [I50.32] 09/26/2017    Encephalopathy [G93.40] 09/26/2017    Cerebrovascular accident (CVA) due to stenosis of left carotid artery ([facility] [address] [I63.232]     Cardiac arrest ([facility] [address] [I46.9]        Interim events in the last 24 Hours   S/p carotid stent. Nurse noting falling BP, 60/30, called me to come to evaluate patient. Patient having agonal respirations. Pulse intermittently palpable  We initially gave a dose of epinephrine 1:82331 2mL w/ no improvement in BP  We could not feel a pulse and initiated CPR and gave the rest of the epi (8mL)  Dr Beach Liter at bedside  ROSC achieved, patient intubated    IV access lost, I/O access obtained L tibia. Patient began waking up and bucking ventilator and reaching for staff, following commands. This patient is critically ill and at significant increased risk of morbidity or mortality. I spent 45 minutes providing non-procedural critical care time independent of other providers.

## 2017-09-26 NOTE — PROGRESS NOTES
[id number]  Patient meets PACU D/C criteria, patient is oriented only to self at this time. Patient is on 2L NC and SBP in lows 90's. Patient on a dopamine drip at 20 mcg/kg/min at this time. Patient has a right arterial sheath in place at this time, A. Line being transduced through sheath. Patient has a vasc band on her right radial artery from an old procedure puncture site. 9 ml of air in vasc band at this time. Family updated. Report called to Chel Cordoba RN 4D. Transported patient up to 4D room 11 on 2L NC at this time.

## 2017-09-26 NOTE — PROGRESS NOTES
Sedation Pre-Procedure Note    Patient Name: Juancarlos Ronquillo  YOB: 1935  Room/Bed: 2E-17/017-A  Medical Record Number: [id number]  Date: 09/26/17      Indication:  Recent stroke w/ carotid stenosis    Consent: I have discussed with the patient and/or the patient representative the indication, alternatives, and the possible risks and/or complications of the planned procedure and the anesthesia methods. The patient and/or patient representative appear to understand and agree to proceed. Vital Signs:   Vitals:    09/26/17 0550   BP: (!) 160/75   Pulse: 67   Resp: 17   Temp: 98.1 °F (36.7 °C)   SpO2: 97%       Past Medical History:      Diagnosis Date    Alcohol abuse     Carotid artery occlusion 09/18/2017    History of falling     Hyperlipidemia     Hypertension     Low back pain     Movement disorder     Muscle weakness (generalized)     Osteopenia     Scoliosis     lower lumbar Intervertebral Disc Displacement    Vitamin B-complex deficiency        Past Surgical History:         Procedure Laterality Date    HYSTERECTOMY      SHOULDER SURGERY Bilateral        Medications:   No current facility-administered medications on file prior to encounter. Current Outpatient Prescriptions on File Prior to Encounter   Medication Sig Dispense Refill    loratadine (CLARITIN) 10 MG capsule Take 10 mg by mouth daily      metoprolol succinate (TOPROL XL) 25 MG extended release tablet Take 25 mg by mouth daily      lidocaine (LIDODERM) 5 % Place 1 patch onto the skin daily 12 hours on, 12 hours off.  clopidogrel (PLAVIX) 75 MG tablet Take 1 tablet by mouth daily 30 tablet 3    aspirin 162 MG EC tablet Take 1 tablet by mouth daily. (Patient taking differently: Take 81 mg by mouth daily ) 30 tablet 0    omeprazole (PRILOSEC) 20 MG capsule Take 40 mg by mouth daily.  Calcium Carbonate-Vitamin D (CALTRATE 600+D) 600-400 MG-UNIT CHEW Take 1 tablet by mouth daily.       amLODIPine (NORVASC) 5 MG tablet Take 2.5 mg by mouth nightly       docusate sodium (COLACE) 100 MG capsule Take 100 mg by mouth daily as needed for Constipation      magnesium hydroxide (MILK OF MAGNESIA) 400 MG/5ML suspension Take 30 mLs by mouth daily as needed for Constipation.  therapeutic multivitamin-minerals (THERAGRAN-M) tablet Take 1 tablet by mouth daily.  Ascorbic Acid (VITAMIN C) 250 MG tablet Take 250 mg by mouth daily. Coumadin Use Last 7 Days: no  Antiplatelet drug therapy use last 7 days: yes  Other anticoagulant use last 7 days: no  Additional Medication Information: no        Pre-Sedation Documentation and Exam:   I have personally completed a history, physical exam & review of systems for this patient (see notes).     Mallampati Airway Assessment:  Mallampati Class II - (soft palate, fauces & uvula are visible)    Prior History of Anesthesia Complications:   none     ASA Classification:  Class 2  A normal healthy patient with mild systemic disease    Sedation/ Anesthesia Plan:   intravenous sedation    Medications Planned:   midazolam (Versed) intravenously    Patient is an appropriate candidate for plan of sedation: yes    Maura Meehan M.D., J.D.  [phone]

## 2017-09-26 NOTE — FLOWSHEET NOTE
During my contact with the patient (pt. has coded), family members were tearfully present. I offered emotional support, nurtured hope, provided words of encouragement and prayer of healing/comfort to the pt./family. I also gave the pts family a spiritual care card if they needed further support. Spiritual plan: It would be helpful if Spiritual Care would continue to follow up on this case. 09/26/17 1915   Encounter Summary   Services provided to: Patient; Family   Referral/Consult From: Nurse   Support System Family members   Continue Visiting Yes  (9/26/17 Pt coded)   Complexity of Encounter High   Length of Encounter 30 minutes   Routine   Type Initial   Assessment Tearful;Grieving; Anxious; Fearful   Intervention Active listening;Prayer   Outcome Expressed gratitude;Coping   Crisis   Type Code   Spiritual/Protestant   Type Spiritual support

## 2017-09-26 NOTE — PROCEDURES
Arlette Pearl is a 80 y.o. female patient. No diagnosis found. Past Medical History:   Diagnosis Date    Alcohol abuse     Carotid artery occlusion 09/18/2017    History of falling     Hyperlipidemia     Hypertension     Low back pain     Movement disorder     Muscle weakness (generalized)     Osteopenia     Scoliosis     lower lumbar Intervertebral Disc Displacement    Vitamin B-complex deficiency      Blood pressure (!) 148/59, pulse 96, temperature 96.1 °F (35.6 °C), resp. rate 16, height 5' 3\" (1.6 m), weight 107 lb (48.5 kg), SpO2 (!) 85 %. Intubation  Date/Time: 9/26/2017 7:04 PM  Performed by: Lei Osborn by: Rolan Lennox   Consent: The procedure was performed in an emergent situation. Verbal consent not obtained. Written consent not obtained.   Indications: respiratory failure and  airway protection  Intubation method: video-assisted  Patient status: unconscious  Laryngoscope size: Mac 3  Tube size: 8.5 mm  Tube type: cuffed  Number of attempts: 1  Ventilation between attempts: BVM  Cricoid pressure: yes  Cords visualized: yes  Post-procedure assessment: chest rise and CO2 detector  Breath sounds: equal  Cuff inflated: yes  ETT to teeth: 23 cm  Tube secured with: ETT villalta  Comments: Done by RT Grecia Plasencia under my direct supervision          Daria Sánchez MD  9/26/2017

## 2017-09-27 NOTE — PLAN OF CARE
Problem: Restraint Use - Nonviolent/Non-Self-Destructive Behavior:  Goal: Absence of restraint indications  Absence of restraint indications   Outcome: Completed Date Met:  09/27/17  No longer reaches for tubes, extubated  Goal: Absence of restraint-related injury  Absence of restraint-related injury   Outcome: Completed Date Met:  09/27/17  No restraint related injury

## 2017-09-27 NOTE — CONSULTS
[facility]'s          Patient - Pinky Ashley,  Age - 80 y.o.    - 1935      Room Number - 4D-11/011-A   [facility] -  [id number]   [facility] # - [de-identified]  Date of Admission -  2017  5:30 AM  Hospital Day - 0    Reason for consultation  Management of hypotension and tachycardia  Vascular access complications      History of present illness  82 years patient with past medical history of hypertension and dyslipidemia had of  a recent stroke. Workup showed carotid stenosis. Patient was here for left internal carotid artery stenting. The procedure was difficult due to complex aortic arch. It was done successfully wired right radial access. Postprocedure patient was sent to the ICU. Patient had a cardiac arrest a few hours later. Still intubated and CPR was done a few minutes. She was started on dopamine drip and received 2 units of blood. Patient had multiple episodes of hypotension. Right groin arterial access site had hematoma with compromise pulse in the right dorsalis pedis and posterior tibial arteries. Cardiology consultation was requested stat for management of hypotension and tachycardia as well as vascular site complication. I discussed the case with RN in details on the phone. In my opinion, hypotension and tachycardia could be most probably due to acute blood loss. And there was a possibility of right groin hematoma with possible limb ischemia.       Past Medical History:          Diagnosis Date    Alcohol abuse     Carotid artery occlusion 2017    History of falling     Hyperlipidemia     Hypertension     Low back pain     Movement disorder     Muscle weakness (generalized)     Osteopenia     Scoliosis     lower lumbar Intervertebral Disc Displacement    Vitamin B-complex deficiency        Past Surgical History:          Procedure Laterality Date    HYSTERECTOMY      SHOULDER SURGERY Bilateral        Medications Prior to clopidogrel  75 mg Oral Once    bupivacaine (PF)  5 mL Intra-articular Once    methylPREDNISolone acetate  80 mg Intra-articular Once    sodium chloride  250 mL Intravenous Once    fentaNYL        sterile water        atropine          norepinephrine 12 mcg/min (09/27/17 0406)    propofol 20 mcg/kg/min (09/27/17 0406)    sodium chloride 100 mL/hr at 09/26/17 0714    sodium chloride 125 mL/hr at 09/27/17 0336    DOPamine Stopped (09/27/17 0146)     sodium chloride flush, acetaminophen, HYDROcodone 5 mg - acetaminophen, ondansetron, iohexol, morphine    Lab     CBC: Recent Labs      09/26/17 1900 09/27/17   0035  09/27/17   0105  09/27/17   0325   WBC  21.2*   --   38.5*  38.3*   RBC  3.00*   --   3.78*  3.80*   HGB  9.3*  12.5  11.3*  11.6*   HCT  27.7*  37.6  34.3*  34.3*   MCV  92.4   --   90.6  90.3   MCH  31.0   --   29.9  30.4   MCHC  33.5   --   33.0  33.7   RDW  14.3   --   14.8*  15.0*   PLT  235   --   214  235   MPV  9.1   --   9.1  9.1      BMP/CMP:   Recent Labs      09/26/17   0626  09/27/17 0325   NA  141  135   K  4.2  4.3   CL  101  107   CO2  25  14*   ANIONGAP  15.0  14.0   BUN  27*  27*   CREATININE  1.0  1.5*   GLUCOSE  106  184*   CALCIUM  9.0  7.4*   PROT  6.3   --    LABALBU  3.7   --    BILITOT  0.4   --    ALKPHOS  202*   --    AST  16   --    ALT  10*   --       Other Electrolytes:   No results for input(s): CAION, PHOS, MG in the last 72 hours. Procalcitonin:  No results for input(s): PROCAL in the last 72 hours. Cardiac:   Recent Labs      09/26/17 2115 09/27/17   0215   TROPONINT  < 0.010  0.013*       Coagulation:   Recent Labs      09/26/17   1900 09/27/17   0039  09/27/17   0325   INR  1.33*  1.11  1.11     Lactic Acid: No results for input(s): LACTA in the last 72 hours.    ABGs:   Lab Results   Component Value Date    PH 7.22 09/26/2017    PCO2 43 09/26/2017    PO2 98 09/26/2017    HCO3 18 09/26/2017    O2SAT 96 09/26/2017     Lab Results   Component Value Date don't see any obvious sign of limb ischemia. Get vascular ultrasound for better assessment. Patient's troponin was slightly elevated. EKG did not show any ischemia. Get serial EKG and troponin. Get echocardiogram to assess the LV function. Continue vent management as per critical care team.      Continue post carotid stent As per neuro team.      Further management plan after reassessment in next few hours. Case discussed with nurse. Questions and concerns addressed. Meds and orders reviewed. There was a high probability of clinically significant/life threatening deterioration in this patient's condition which required my urgent intervention. Total critical care time was 45 minutes. This excludes any time for separately reportable procedures.         Soila Pyle MD, Humberto Abernathy, [facility]  Electronically signed 9/27/2017 at 4:20 AM

## 2017-09-27 NOTE — PLAN OF CARE
Problem: Restraint Use - Nonviolent/Non-Self-Destructive Behavior:  Goal: Absence of restraint indications  Absence of restraint indications   Outcome: Ongoing  For pt safety. Goal: Absence of restraint-related injury  Absence of restraint-related injury   Outcome: Ongoing  No evidence of injury. Comments:   Care plan reviewed with patient and family. Patient and family verbalize understanding of the plan of care and contribute to goal setting.

## 2017-09-27 NOTE — PROGRESS NOTES
Nutrition Assessment    Type and Reason for Visit: Initial, Positive Nutrition Screen (chewing and swallowing difficulties)    Nutrition Recommendations: Begin diet if passes RN BSE for swallowing or SLP evaluation as needed - will add ONS once diet initiated.      Malnutrition Assessment:  · Malnutrition Status: Insufficient data     Nutrition Diagnosis:   · Problem: Inadequate oral intake  · Etiology: related to Impaired respiratory function-inability to consume food     Signs and symptoms:  as evidenced by NPO status due to medical condition (recently extubated and still NPO)    Nutrition Assessment:  · Subjective Assessment: Pt. extubated this am; was admit for ICA stent and coded afterwards; spoke with RN and awaiting orders re: po post extubatin; pt. resting now - did not disturb; glucose 184, BUN 27, creatinine 1.5, phosphorus 5; will follow re: po ability and need for ONS  · Wound Type: None  · Current Nutrition Therapies:  · Oral Diet Orders: NPO   · Anthropometric Measures:  · Ht: 5' 3\" (160 cm)   · Current Body Wt: 107 lb (48.5 kg) (9/26 with no edema however stated weight)  · Admission Body Wt: 107 lb (48.5 kg) (9/27 stated)  · Usual Body Wt: 124 lb (56.2 kg) (11/10/14 per EMR)  · % Weight Change: 17# or 14%,  ~2 years  · Ideal Body Wt: 115 lb (52.2 kg), % Ideal Body 93%  · BMI Classification: BMI 18.5 - 24.9 Normal Weight (19)  · Comparative Standards (Estimated Nutrition Needs):  · Estimated Daily Total Kcal: 2216-9124  · Estimated Daily Protein (g): 49-58 grams    Estimated Intake vs Estimated Needs: Intake Less Than Needs    Nutrition Risk Level: High    Nutrition Interventions:   Continue NPO, Start oral diet, Start ONS (when po diet cleared per swallow evaluation)  Continued Inpatient Monitoring, Education not appropriate at this time    Nutrition Evaluation:   · Evaluation: Goals set   · Goals: adequate nutrient intake in 1-4 days    · Monitoring: NPO Status, Weight, Pertinent Labs,

## 2017-09-27 NOTE — CONSULTS
Neurology Inpatient Progress Note:  Date of Service: 09/27/17  Chief Complaint: No chief complaint on file. ASSESSMENT AND PLAN:  1. Symptomatic L ICA stenosis s/p stenting.   -pt did well immediately post-procedure. However, in evening pt was noted to be hypotensive and \"pulseless. \"  Code was called. Pt likely was hypotensive but not pulseless. Her IV dopamine line infiltrated, and she did have some blood loss due to radial access, however, her H/H was rather stable and thus unlikely implicated. She remains awake, and alert. She is following most commands. Wean trial and plan on extubation per ICU service. Elevated WBC suspect 2/2 reactive rather than primary infection. Continue aspirin/plavix. -DVT prophylaxis  At least 32 minutes have been spent on the care of this patient. Greater than 50% of the floor time has been spent providing counseling/coordination of care with patient, family, and/or other providers/agencies involved with the patient's care. Charley Isabel M.D., J.D. Vascular Neurology and Endovascular Surgical Neuroradiology  Cell: [phone]      S: No acute events overnight. Neurologic status is stable. ROS was reviewed today and unchanged since last review. Namely, there are no new rashes, no new symptoms concerning for anaphylaxis, no new black/tarry stools, no new symptoms of temperature intolerance.     Medications:  Current Facility-Administered Medications: norepinephrine (LEVOPHED) infusion, , ,   norepinephrine (LEVOPHED) 16 mg in dextrose 5% 250 mL infusion, 2 mcg/min, Intravenous, Continuous  propofol 1000 MG/100ML injection, 10 mcg/kg/min, Intravenous, Titrated  potassium (CARDIAC) replacement protocol, , Other, RX Placeholder  calcium replacement protocol, , Other, RX Placeholder  magnesium replacement protocol, , Other, RX Placeholder  phosphorus replacement protocol, , Other, RX Placeholder  enoxaparin (LOVENOX) injection 30 mg, 30 mg, Subcutaneous, Q24H  potassium Lab Results   Component Value Date    ALKPHOS 202 09/26/2017    ALT 10 09/26/2017    AST 16 09/26/2017    BILITOT 0.4 09/26/2017    BILIDIR 4.7 11/13/2014    LABALBU 3.7 09/26/2017    LIPASE 144.5 11/09/2014    LIPASE 150.0 11/09/2014     Lab Results   Component Value Date    TROPONINI 0.007 07/06/2013      No results found for: LABA1C    No results found for: CHARCSF, CULTURE  No results found for: PHENYTOIN, CARBTOT, PHENOBARB, VALPROATE  No results found for: [facility]    Lab Results   Component Value Date    NITRU NEGATIVE 09/26/2017    COLORU YELLOW 09/26/2017    PHUR 6.0 09/26/2017    LABCAST NONE SEEN 09/26/2017    LABCAST NONE SEEN 09/26/2017    WBCUA 2-4 09/26/2017    RBCUA 0-2 09/26/2017    YEAST NONE SEEN 09/26/2017    BACTERIA NONE 09/26/2017    SPECGRAV >1.030 09/26/2017    LEUKOCYTESUR NEGATIVE 09/26/2017    LEUKOCYTESUR TRACE 11/09/2014    UROBILINOGEN 0.2 09/26/2017    BILIRUBINUR NEGATIVE 09/26/2017    BLOODU NEGATIVE 09/26/2017    KETUA NEGATIVE 09/26/2017

## 2017-09-27 NOTE — PROCEDURES
ICU PROCEDURE - ENDOTRACHEAL INTUBATION    Juaquin Gonzales  : 1935 MRN: [id number]  ACCT#: [de-identified]  CSN#: [de-identified]     INDICATION: Altered mental status - stupor, hypercapnic acute resp failure (severe met acidosis w/o resp compensation)    TIME OUT: taken    Permission obtained, risks/benefits reviewed with family who elected to proceed. This procedure was performed as a life preserving intervention. ANESTHESIA:   [x]20% Benzocaine oropharyngeal spray  []Lidocaine injection 2%  [x]Etomidate - 20 mg IV  []Midazolam  []Propofol  []Succinylcholine  []Other medications:    ESTIMATED BLOOD LOSS:  [x] N/A  [] Other:    COMPLICATIONS:  [x]N/A  [] Other:    LARYNGOSCOPIC AIRWAY GRADE (CORMACK-LEHANE):[]1  [x]2a  []2b []3  []4        INTUBATION EQUIPMENT USED:  [x] Glidescope Blade  [x] #3  [] #4  [x] Gliderite stylet  [] Other:    OUTCOME: Successful placement of # 7.5 Taperguard Evac endotracheal via   [x]Oral route  []Nasal route    INSERTION DEPTH: 23 cm from   []lip   [x]gum line    CONFIRMATION OF TUBE POSITION:   [x]Capnography - Strong & repeatable exhaled CO2 detection   [x]Multiple point auscultation   [x]SpO2 response   [x]STAT X-ray   []Bronchoscopic assessment    UNUSUAL FINDINGS:    Echocardiogram:   Summary   Normal left ventricle size.   Low systolic function. Ejection fraction was estimated at 45-50%.   There was moderate tricuspid regurgitation.   Mild pulmonic regurgitation visualized.   Pulmonary artery systolic pressure calculated from tricuspid regurgitation jet is 55 mmhg. Repeat Lactic acid 1.7 mmol/L    PROCEDURE:   The patient was preoxygenated utilizing 100% oxygen via Ambu bag-valve mask. After adequate anesthesia the appropriate size laryngoscope blade was utilized to  explore the airway. Using video laryngoscopy, the vocal cords were well visualized and the endotracheal tube was placed through the cords under direct vision.  The pretested, lubricated ETT was advanced to the posterior glottic commissure using the dedicated stylet. The stylet was then removed by RRT staff as the ETT was uneventfully passed through the vocal cords w/o difficulty or resistance. Good breath sounds were auscultated bilaterally without sounds over abdomen. Appropriate strong & repeatable exhaled CO2 detection was confirmed. Appropriate initial ventilator settings were made with follow-up ABG, STAT chest x-ray and respiratory cultures ordered. Renal function indices have further deteriorated - additional IV volume (incl bicarbonate) given based upon bedside discussion with Drs Duane Pander and Wilton Lindsey. IV Bicarbonate given and Nephrology contacted for severe oliguria. Have note ordered urine indices in light of recent IV radiocontrast but will do so if Nephrology desires. Contacted Dr Hayden Huitron and case reviewed. The patient tolerated the procedure without evident complication. SpO2, cardiac rhythm and hemodynamics were stable throughout the procedure. I appreciate the opportunity to assist Dr. Duane Pander in the care of this patient.     Emanuel Crowe MD

## 2017-09-27 NOTE — PLAN OF CARE
Problem: Falls - Risk of  Goal: Absence of falls  Outcome: Met This Shift  No falls this shift    Problem: Discharge Planning:  Goal: Discharged to appropriate level of care  Discharged to appropriate level of care   Outcome: Not Met This Shift  Pt remains in icu    Problem: Tissue Perfusion - Peripheral, Altered:  Goal: Absence of hematoma at arterial access site  Absence of hematoma at arterial access site   Outcome: Met This Shift  No hematoma at arterial site  Goal: Circulatory function of lower extremities is within specified parameters  Circulatory function of lower extremities is within specified parameters   Outcome: Not Met This Shift  Pt feet dusky, cool with intermittent pulses    Problem: Pain:  Goal: Pain level will decrease  Pain level will decrease   Outcome: Met This Shift  Pain level is controlled  Goal: Control of acute pain  Control of acute pain   Outcome: Met This Shift  Acute pain controlled  Goal: Control of chronic pain  Control of chronic pain   Outcome: Met This Shift  Chronic pain controlled    Problem: Bleeding:  Goal: Will show no signs and symptoms of excessive bleeding  Will show no signs and symptoms of excessive bleeding   Outcome: Met This Shift  No signs and symptoms of excessive bleeding noted    Problem: Skin Integrity - Impaired  Goal: Wound size and drainage will decrease and surrounding tissue/skin remains healthy and intact  Outcome: Met This Shift  Wound nurses monitoring site of dopamine infiltrate    Problem: DISCHARGE BARRIERS  Goal: Patients continuum of care needs are met  Outcome: Met This Shift  Patients continuum of care needs are met    Problem: Nutrition  Goal: Optimal nutrition therapy  Outcome: Not Met This Shift  Pt remains npo    Comments:   Care plan reviewed with patient and family. Family verbalize understanding of the plan of care and contribute to goal setting.  Patient unable to verbalize

## 2017-09-27 NOTE — FLOWSHEET NOTE
0896 Dr, Duane Pander sent perfect serve text, patient complaining of posterior neck pain, family states she takes her medication crushed in applesauce, at present patient unwilling to take ice chips. , also updated with blood pressure

## 2017-09-27 NOTE — PLAN OF CARE
Problem: Falls - Risk of  Goal: Absence of falls  Outcome: Met This Shift    Problem: Discharge Planning:  Goal: Discharged to appropriate level of care  Discharged to appropriate level of care   Outcome: Not Met This Shift    Problem: Tissue Perfusion - Peripheral, Altered:  Goal: Absence of hematoma at arterial access site  Absence of hematoma at arterial access site   Outcome: Met This Shift  Goal: Circulatory function of lower extremities is within specified parameters  Circulatory function of lower extremities is within specified parameters   Outcome: Not Met This Shift  Decreased circulation to lower extremities, feet remain cool, skin pale pink. Pedal and post tibial pulses present by Doppler bilaterally    Problem: Pain:  Goal: Pain level will decrease  Pain level will decrease   Outcome: Ongoing  Goal: Control of acute pain  Control of acute pain   Outcome: Ongoing  Goal: Control of chronic pain  Control of chronic pain   Outcome: Ongoing    Problem: Bleeding:  Goal: Will show no signs and symptoms of excessive bleeding  Will show no signs and symptoms of excessive bleeding  Outcome: Not Met This Shift  Bleeding from right radial puncture site. Comments:   Care plan reviewed with family. Family verbalize understanding of the plan of care and contribute to goal setting.   Electronically signed by Javid Barker RN on 9/26/2017 at 9:41 PM

## 2017-09-27 NOTE — PLAN OF CARE
Problem: Nutrition  Goal: Optimal nutrition therapy  Outcome: Ongoing  Nutrition Problem: Inadequate oral intake  Intervention: Food and/or Nutrient Delivery: Continue NPO, Start oral diet, Start ONS (when po diet cleared per swallow evaluation)  Nutritional Goals: adequate nutrient intake in 1-4 days

## 2017-09-27 NOTE — PROGRESS NOTES
1925: dr. Prateek Snider returned page for stat consult per dr. Alean Habermann request.  Dr. Prateek Snider already been in contact with dr. Rosalio Guzman and aware of all current treatments. Dr. Prateek Snider stated that he is going to monitor kidney function for 12-24 hours to see if kidney function picks up.      2000: family at bedside, updated on pt current condition. Son Andrei Tipton is going to call . 2005: dr. Doris Muhammad bedside and satisfied with neuro status. Dr. Doris Muhammad trying to get pt to react to son. Dr. Doris Muhammad stated to have on as little of sedation as possible.

## 2017-09-27 NOTE — PLAN OF CARE
Problem: Breathing Pattern - Ineffective  Goal: Normal spontaneous ventilation  Outcome: Ongoing  Patient remains on ventilator at this time. Will continue to assess Q4. VENTILATOR LIBERATION PROTOCOL     PRE-TRIAL PATIENT ASSESSMENT - COMPLETED AT 0448     Ventilatory Assessment:      PARAMETER CRITERIA FOR WEANING   Reversal  of the acute disease process that prompted intubation: No At least partial or complete reversal   FiO2 : 30 % FIO2 less than or equal to 50%     PEEP less than or equal to 5 cm H2O   Hemodynamic stability: No Dopamine or Dobutamine at 5 mcg/kg/minute or less   Adequate correction of electrolytes, Hgb, and HCT: No Within lab range   Neurologic stability: No Ability to cough, voluntarily initiate ventilator effort, cooperate with pulmonary toilet measures      ABG:   Lab Results   Component Value Date     PH 7.22 09/26/2017     PO2 98 09/26/2017     PCO2 43 09/26/2017     HCO3 18 09/26/2017     O2SAT 96 09/26/2017     HGB/WBC:  Lab Results   Component Value Date     HGB 11.6 (L) 09/27/2017     WBC 38.3 (HH) 09/27/2017                    Vital Signs:      PARAMETER CRITERIA FOR WEANING Meets Criteria   Pulse: 129 Within patient's normal limits / stable No   Resp: 14 Less than or equal to 30 Yes   BP: 100/64 Within patient's normal limits / minimal pressors (Hemodynamically Stable) No   SpO2: 100 % Greater than or equal to 90% No   Temp: 99.7 °F (37.6 °C) Less than 38. 5oC / 101. 3oF No     Sedation/paralytic weaned No      [ ]     Based on this assessment and the Ventilator Liberation Protocol, this patient IS being placed on a Spontaneous Breathing Trial (SBT) at this time. [X]        Based on this assessment and the Ventilator Liberation Protocol, this patient IS NOT being placed on a Spontaneous Breathing Trial (SBT) at this time.

## 2017-09-27 NOTE — PLAN OF CARE
Problem: DISCHARGE BARRIERS  Goal: Patients continuum of care needs are met  Outcome: Ongoing  Patient admitted from [facility], continue to assess progress. Will need pre-certed. See SW note.

## 2017-09-27 NOTE — PLAN OF CARE
Problem: Skin Integrity - Impaired  Goal: Wound size and drainage will decrease and surrounding tissue/skin remains healthy and intact  Outcome: Ongoing  Asked to see pt today to assess left forearm at IV infiltration site. Pt in bed upon entry to room. Family at bedside. Pt noted to be on [employer] bed. Pt sedated at this time. Pt noted to have fragile thin skin. There are multiple bruises with edema noted to left forearm. There is an area of necrotic tissue noted within the edematous area. The lateral side of the left forearm noted to have an open blood blister that is draining scant amount of sanguinous drainage. No signs of infection noted at site. Recommend to leave open to air at this time. Will continue to follow. Comments:                 Left forearm     Care plan reviewed with patient and RN. Patient and RN verbalize understanding of the plan of care and contribute to goal setting.

## 2017-09-27 NOTE — PROGRESS NOTES
Patient has been successfully weaned from Mechanical Ventilation. RSBI before extubation was 49 and SpO2 of 100 on 30% FiO2. Patient extubated and placed on 2 liters/min via nasal cannula. Post extubation SpO2 is 97% with HR  107 bpm and RR 24 breaths/min. Patient had mild cough that was non-productive. Extubation Well tolerated by patient. Sharla Lo

## 2017-09-27 NOTE — PROGRESS NOTES
Intensive Care Unit  Intensivist Progress Note    Patient: Noemi Abernathy  : 1935  MRN#: [id number]  2017 6:18 PM  ADMISSION DAY:  2017  5:30 AM   - Stroke vol Variance      Wt Readings from Last 3 Encounters:   17 107 lb (48.5 kg)   17 107 lb (48.5 kg)   17 115 lb (52.2 kg)      Body mass index is 18.95 kg/(m^2). 79 yo  female  PMHx and problems:  - LV diastolic dysfunction CHF (CHFpEF)  - Dyslipidemia   - Hypercholesterolemia, low HDL at last check ,  in   - CKD by lab review   - Superimposed ZAIRA - radiocontrast nephropathy vs renal hypoperfusion injury/\"ATN\"  - Current low CO state   - Volume depletion vs \"cold sepsis\" vs coronary ischemia   - Exam and NICOM data consistent with low CO, high SVR state  - S/P right parietal strokes by MRI   - Spinal DJD S/P lumbar epidural block/steroid injection 17 (80 mg Depo-Medrol and 2 mL Marcaine 0.25 percent)  - Recent left hemispheric stroke by history with carotid ASCVD   - S/P Carotid angiography with left ICA stent  - difficult anatomy requiring radial artery approach   - Radial hemorrhage and right femoral sheath site hemorrhage/hematoma (post-removal sheath) controlled by compression    - Hgb and Hct adequately maintained, PRBC empirically given last night  - ASCVD   - Rt LE .  Left LE disease with reduced peripheral pulses   AM U/S:     - Post-carotid stent \"arrest\"   - Bradycardia, hypotension - poorly responsive to IV Dopamine, epinephrine, brief CPR given   - Question of carotid sinus hypersensitivity post-stent  - Elevated alkaline phosphatase   - Known gallstones by prior U/S , other etiology possible  - Elevated WBC, lactic acid, procalcitonin    - Sepsis (Resp vs other site - GB, spinal injection, etc.)   - Effects steroid injection, \"arrest\", possible \"shock liver\" possible  - Moderate lactic acidosis with nl anion gap this AM - suggests hypoalbuminemia  - Hypoactive delirium   - Effects stroke vs hypotensive brain injury vs sepsis and/or hypercapnia  - Post-stent variable right UE hemiplegia    Examination  General - Poorly responsive, snoring and unable to be adequately awakened. She is nonverbal and will not track examiner. (+) spontaneous movements left UE, bilateral LE, none Rt UE - flaccid. Has withdrawal bilateral LE, left UE to nail pressure, NR Rt UE. Babinski with down-going toes & withdrawal bilaterally  Cor - Tele NSR 76-80/min. BP variably low - initially 088-709 systolic, now 76. PMI ill defined, S1 & S2 distant w/o murmur, rub or gallop. No JVD seen at 30 degrees head elevation. Carotid pulses mildly reduced bilat w/o bruits, radials with mod bilateral reduction, DP & PT bilaterally non-palpable. NICOM as above with CI 1.8 L/min/M^2 at baseline, 2.2 after PLR with ~23.7% increase in SVI with passive leg raise. Resp - Few upper airway rhonchi improved after spontaneous cough. No upper airway stridor, wheeze or rhonchi. Lungs with reduced bilateral breath sounds, poor efforts with bilateral coarse rhonchi, increased at dependent lung zones  Abdomen - Soft w/o distention, HSM, apparent tenderness or guarding, (-) Urias's sign  Extremities - Extensive bilateral UE ecchymoses - forearms and hands. There is 2+ bilateral pitting edema of hands, trace forearms, none at thighs with mild distal LE pitting edema. Distal UE & LE temp with moderate-severe reduction and capillary refill much delayed. Acrocyanosis hands, feet, distal pretibial regions noted bilaterally with mottling. Rt femoral hematoma noted w/o active bleeding.     Scheduled Meds:   potassium (CARDIAC) replacement protocol   Other RX Placeholder    calcium replacement protocol   Other RX Placeholder    magnesium replacement protocol   Other RX Placeholder    phosphorus replacement protocol   Other RX Placeholder    enoxaparin  30 mg Subcutaneous Q24H    ertapenem (INVANZ) IVPB  500 mg Intravenous Q24H    vancomycin Urine 0.2     Nitrite, Urine NEGATIVE     Leukocytes, UA NEGATIVE     Color, UA YELLOW     Character, Urine CLEAR     RBC, UA 0-2     WBC, UA 2-4     Epi Cells 0-2     Bacteria, UA NONE     Casts NONE SEEN     Crystals NONE SEEN     Renal Epithelial, Urine NONE SEEN     Yeast, UA NONE SEEN     Casts NONE SEEN     Urine culture (-) so far    Resp culture per ETT 9/17:  9/27/2017 10:53 AM       Gram Stain Result 09/27/2017  8:40 AM  - [facility]     Many segmented neutrophils observed. Rare epithelial cells observed. Many gram positive cocci in pairs and chains. Many gram positive cocci in pairs and clusters. Rare small gram positive bacilli. KEY ISSUES/FINDINGS/DISCUSSION / RECOMMENDATIONS:  PMHx and problems as above  Abnl WBC, lactate, Procalcitonin - sepsis syndrome vs effects of hypotensive \"arrest\"  Variable loss of Rt UE movement - hypoperfusion related?  (Low CO/High SVR state as per NICOM data above)    Suggestions/Plan - reviewed with Dr Florence Pollack   - Fluid bolus as need suggested by NICOM   - Added back norepinephrine BP support + dobutamine inotropic support as CO demands & HR allows - follow NICOM data, examination (peripheral perfusion)   - Contacted Dr Refugio Bhakta and requested he check echocardiogram data to aid in guidance of further mgmt    ID   - Cultures already sent   - Load with ~ 15 mg/kg vancomycin, carbapenam adjusted for renal function (CKD)     F/E/N   - Fluid load as above   - Serial lactate, BMP for AG & Renal function    - Incidental check of hepatic function as etiology PCT increase or sepsis source    Resp   - Follow-up ABG Re: acid-base status, evaluation ventilation (PaCO2) as etiology reduced mental status   - \"Pulmonary toilet\" & aspiration precautions (elevate HOB, chlorhexidine oral care, gastric acid suppression) until mental status improved   - Reintubate as ABG and mental status/secretion mgmt/airway control demand     Neuro   - Follow Rt UE function with (hoped for) improvement of CO   - Antiplatelet therapy per Neurointerventionalist   - Repeat neuroimaging as needed per Dr Bee Alejandro discretion    Reviewed with family and Dr Veronica Caro in ICU. Dr Sarita Sinha contacted as noted above. Discussed with Dr Caryl Roy in ICU  Reviewed with ICU Staff     Seen with multidisciplinary ICU team & care coordinated ~ 55 minutes   Meets Continued ICU Level Care Criteria:    [x] Yes:Unstable neurologic status, possible sepsis, unstable resp status as above  [] No:      Angel Murdock MD    Addendum: ABG - pH 7.19 - primary metabolic acidosis w/o resp compensation - will reintubate and address met acidosis as above  Dr Veronica Caro here and aware    E. Jose Daniel MD

## 2017-09-27 NOTE — FLOWSHEET NOTE
Vicki Neff, Dr. James Brooks, and family at bedside. RT and several RNs at bedside. Ms. Gabo Payne appears somulent, poor ABG results, and pale. Results for Patrica Haney (MRN [id number]) as of 9/27/2017 19:07   Ref. Range 9/27/2017 18:45   pH, Blood Gas Latest Ref Range: 7.35 - 7.45  7.19 (LL)   PCO2 Latest Ref Range: 35 - 45 mmhg 37   pO2 Latest Ref Range: 71 - 104 mmhg 93   HCO3 Latest Ref Range: 23 - 28 mmol/l 14 (L)   Base Excess (Calculated) Latest Ref Range: -2.5 - 2.5 mmol/l -12.9 (L)   O2 Sat Latest Units: % 95   [place] Test Unknown Positive   IFIO2 Unknown 3       1858 - 20 etomidate administered. Ambu-bagging patient. 1859 - Successfully intubated at first attempt. ETT 7.5, ETT 23 at teeth, + color change, + bilateral breath sounds. Called for STAT chest x-ray.

## 2017-09-27 NOTE — PROGRESS NOTES
1913 dopamine gtt decreased to 10mcg. og placed. Rizo cath inserted. 1916 dopamine gtt stopped. 1945: dr. Arslan Campbell and dr. Evaristo Phillips at bedside discussing treatment options. Sedation stopped per dr. Monisha Larson request to assess neuro status. Dopamine restarted at [address]. 1950: dopamine increased to 7.5mcg. Iv therapy in room for dopamine infiltration protocol. Iv therapy stated they will inform wound and ostomy about the infiltration. Radiology also bedside for chest xray. Dr. Arslan Campbell read chest xray and stated that ett tube in good placement and og needed advanced. Dr. Evaristo Phillips able to get pain response in all ext. But not in right arm. After a few minutes of pain stimuli, right arm did respond. 1955: right eye does not close properly. Dr. Evaristo Phillips aware due to a change from previous assessment. 2005: 2mls of air let out of vasc band. Propofol started at 10mcg. 2045: dopamine increased to 10 mcgs. 2052: dopamine increased to 12. 5mcgs. 2mls of air released from vasc band. 2057: dopamine increased to 15mcgs. 2107: dr. Evaristo Phillips called to have dopamine titratable to a map vs a sbp. Dr. Evaristo Phillips ordered to have a map 56-78.      2123: family at bedside. Updated family on pt condition. Family stated they would call when at they get home then see pt in the morning. 2204: dr. Evaristo Phillips called due to fleeting to NO pulses in right foot. verified by resource nurse jacqueline maritnez. Dr. Evaristo Phillips ordered to have sheath pulled. Asked if he wanted any labs drawn, he stated not necessary. 2mls of air let out of vasc band. 2246: right fem sheath pulled per dr. Evaristo Phillips order. Manual pressure applied for 20minutes. 2310: 2mls of air let out of vasc band. 0000: pt followed all commands!!     0030 femoral sheath began bleeding. Manual pressure initiated. PT lost significant amounts of blood laying in the bed.       Pako Phelps called and notified

## 2017-09-27 NOTE — PLAN OF CARE
Problem: Breathing Pattern - Ineffective  Goal: Normal spontaneous ventilation  Outcome: Completed Date Met:  09/27/17  Patient successfully extubated.

## 2017-09-28 NOTE — PLAN OF CARE
Problem: RESPIRATORY  Goal: Will be able to breathe spontaneously, without ventilator support  Will be able to breathe spontaneously, without ventilator support  Outcome: Not Met This Shift  Patient remains on vent. We will continue to monitor patient for weaning readiness. VENTILATOR LIBERATION PROTOCOL     PRE-TRIAL PATIENT ASSESSMENT - COMPLETED AT 1907     Ventilatory Assessment:      PARAMETER CRITERIA FOR WEANING   Reversal  of the acute disease process that prompted intubation: No At least partial or complete reversal   FiO2 : 45 % FIO2 less than or equal to 50%     PEEP less than or equal to 5 cm H2O   Hemodynamic stability: No Dopamine or Dobutamine at 5 mcg/kg/minute or less   Adequate correction of electrolytes, Hgb, and HCT: No Within lab range   Neurologic stability: No Ability to cough, voluntarily initiate ventilator effort, cooperate with pulmonary toilet measures      ABG:   Lab Results   Component Value Date     PH 7.19 09/27/2017     PO2 93 09/27/2017     PCO2 37 09/27/2017     HCO3 14 09/27/2017     O2SAT 95 09/27/2017     HGB/WBC:  Lab Results   Component Value Date     HGB 11.6 (L) 09/27/2017     WBC 38.3 (HH) 09/27/2017                    Vital Signs:      PARAMETER CRITERIA FOR WEANING Meets Criteria   Pulse: 74 Within patient's normal limits / stable Yes   Resp: 24 Less than or equal to 30 Yes   BP: 116/77 Within patient's normal limits / minimal pressors (Hemodynamically Stable) Yes   SpO2: 100 % Greater than or equal to 90% Yes   Temp: 97 °F (36.1 °C) Less than 38. 5oC / 101. 3oF Yes     Sedation/paralytic weaned Yes      [ ]     Based on this assessment and the Ventilator Liberation Protocol, this patient IS being placed on a Spontaneous Breathing Trial (SBT) at this time. [X]        Based on this assessment and the Ventilator Liberation Protocol, this patient IS NOT being placed on a Spontaneous Breathing Trial (SBT) at this time.

## 2017-09-28 NOTE — PROGRESS NOTES
Assessment/ Plan   Respiratory failure, not improving  Multifactorial ZAIRA  S/P CV stenting    Continue vent support  I will defer decision for antibiotics to team that started them; 7d therapy  Thanks to nephrology: planning for CVVH      Supporting Data    This patient is critically ill and at significant increased risk of morbidity or mortality. I spent 45 minutes providing non-procedural critical care time independent of other providers. I saw and examined patient  I discussed case with ICU team (RN, RT) caring for patient  I reviewed the chart, VSS, and labs. Patient - Angélica Carrillo,  Age - 80 y.o.    - 1935      Room Number - 4D-11/011-A   Date of Admission -  2017  5:30 AM  Hospital Day - [place] Problems    Diagnosis Date Noted    Stupor [R40.1]     Acute respiratory failure with hypercapnia ([address] [J96.02]     ZAIRA (acute kidney injury) ([address] [C98.1]     Metabolic acidosis [N33.7]     Chronic congestive heart failure with left ventricular diastolic dysfunction (HCC) [I50.32] 2017    Encephalopathy [G93.40] 2017    Cerebrovascular accident (CVA) due to stenosis of left carotid artery ([address] [I63.232]     Cardiac arrest ([address] [I46.9]        Interim events in the last 24 Hours   Remains on MV  Remains on pressors  Seen by nephrology to initiate RRT    Vitals    height is 5' 3\" (1.6 m) and weight is 107 lb (48.5 kg). Her core temperature is 99.3 °F (37.4 °C). Her blood pressure is 155/87 (abnormal) and her pulse is 88. Her respiration is 21 and oxygen saturation is 98%.      Temp: Temp: 99.3 °F (37.4 °C)Temp  Av.9 °F (36.6 °C)  Min: 95.9 °F (35.5 °C)  Max: 99.7 °F (68.3 °C)  BP:  Systolic (93LWT), GXU:151 , Min:97 , MFQ:408   Diastolic (35SCJ), FUR:58, Min:55, Max:116    HR: Pulse  Av.4  Min: 68  Max: 90  Resp: Resp  Av.3  Min: 9  Max: 27SpO2: 98 %   24HR Pulse Ox Range:  SpO2  Av.9 %  Min: 94 %  Max: 100 %O2 Flow Rate (L/min): 2 L/min  Patient Vitals for the past 8 hrs:   BP Temp Temp src Pulse Resp SpO2   09/28/17 1902 (!) 155/87 - - 88 21 98 %   09/28/17 1847 (!) 138/95 - - 90 21 96 %   09/28/17 1832 (!) 140/110 - - 88 22 96 %   09/28/17 1817 (!) 117/101 - - 89 21 96 %   09/28/17 1802 (!) 147/73 - - 88 23 96 %   09/28/17 1747 (!) 143/74 - - 85 18 96 %   09/28/17 1732 (!) 145/72 - - 83 17 -   09/28/17 1707 (!) 157/84 - - 87 20 -   09/28/17 1702 - 99.3 °F (37.4 °C) CORE 87 19 100 %   09/28/17 1652 - - - 86 21 -   09/28/17 1634 (!) 156/78 - - 87 22 100 %   09/28/17 1602 (!) 147/77 99.2 °F (37.3 °C) Rectal 83 18 98 %   09/28/17 1544 - - - 82 24 95 %   09/28/17 1502 (!) 153/71 99.3 °F (37.4 °C) CORE 84 19 96 %   09/28/17 1402 138/62 99.7 °F (37.6 °C) CORE 83 20 94 %   09/28/17 1316 - - - 76 17 97 %   09/28/17 1302 (!) 114/55 99.5 °F (37.5 °C) CORE 76 17 98 %   09/28/17 1202 (!) 115/56 99.1 °F (37.3 °C) CORE 76 19 97 %       ABG  Lab Results   Component Value Date    PH 7.48 09/28/2017    PO2 78 09/28/2017    PCO2 30 09/28/2017    HCO3 22 09/28/2017    O2SAT 97 09/28/2017       BMP  Recent Labs      09/27/17   0325  09/27/17   1834  09/28/17   0530  09/28/17   1200   NA  135  139  139  137   K  4.3  4.9  3.7  3.3*   CL  107  110  107  104   CO2  14*  14*  19*  22*   BUN  27*  32*  36*  35*   CREATININE  1.5*  2.3*  2.2*  2.3*   GLUCOSE  184*  134*  165*  131*   MG  1.5*   --    --    --    PHOS  5.0*   --    --    --    CALCIUM  7.4*  6.9*  7.4*  7.0*        Cxs NGTD

## 2017-09-28 NOTE — PROGRESS NOTES
Department of Radiology  Post Procedure Progress Note      Pre-Procedure Diagnosis:  Renal Failure    Procedure Performed: Dialysis Catheter insertion     Anesthesia: local , versed and dilaudid    Findings: successful    Immediate Complications:  None    Estimated Blood Loss: minimal    SEE DICTATED PROCEDURE NOTE FOR COMPLETE DETAILS.       Shanika Lyman MD   9/28/2017 4:40 PM

## 2017-09-28 NOTE — PLAN OF CARE
Problem: Falls - Risk of  Goal: Absence of falls  Outcome: Ongoing  bedrest    Problem: Discharge Planning:  Goal: Discharged to appropriate level of care  Discharged to appropriate level of care   Outcome: Ongoing  Pt and family involved in d/c planning. Problem: Tissue Perfusion - Peripheral, Altered:  Goal: Absence of hematoma at arterial access site  Absence of hematoma at arterial access site   Outcome: Ongoing  Site WNL  Goal: Circulatory function of lower extremities is within specified parameters  Circulatory function of lower extremities is within specified parameters   Outcome: Ongoing  Pt moves lower ext on command    Problem: Pain:  Goal: Pain level will decrease  Pain level will decrease   Outcome: Ongoing  Pt sedated. Problem: Bleeding:  Goal: Will show no signs and symptoms of excessive bleeding  Will show no signs and symptoms of excessive bleeding   Outcome: Ongoing  Pt does not show signs of bleeding at this time. Problem: DISCHARGE BARRIERS  Goal: Patients continuum of care needs are met  Outcome: Completed Date Met:  09/27/17    Problem: Nutrition  Goal: Optimal nutrition therapy  Outcome: Not Met This Shift  npo    Comments:   Care plan reviewed with patient and family. Patient and family verbalize understanding of the plan of care and contribute to goal setting.

## 2017-09-28 NOTE — PROGRESS NOTES
Formulation and discussion of sedation / procedure plans, risks, benefits, side effects and alternatives with patient and/or responsible adult completed.     Electronically signed by Ignacia Aguilera MD on 9/28/2017 at 4:40 PM

## 2017-09-28 NOTE — PROGRESS NOTES
VENTILATOR LIBERATION PROTOCOL    PRE-TRIAL PATIENT ASSESSMENT - COMPLETED AT [address]    Ventilatory Assessment:    PARAMETER CRITERIA FOR WEANING   Reversal  of the acute disease process that prompted intubation: Yes At least partial or complete reversal   FiO2 : 40 % FIO2 less than or equal to 50%     PEEP less than or equal to 5 cm H2O   Hemodynamic stability: Yes Dopamine or Dobutamine at 5 mcg/kg/minute or less   Adequate correction of electrolytes, Hgb, and HCT: Yes Within lab range   Neurologic stability: Yes Ability to cough, voluntarily initiate ventilator effort, cooperate with pulmonary toilet measures     ABG:   Lab Results   Component Value Date    PH 7.48 09/28/2017    PO2 78 09/28/2017    PCO2 30 09/28/2017    HCO3 22 09/28/2017    O2SAT 97 09/28/2017     HGB/WBC:  Lab Results   Component Value Date    HGB 7.4 (L) 09/28/2017    WBC 17.1 (H) 09/28/2017         Vital Signs:    PARAMETER CRITERIA FOR WEANING Meets Criteria   Pulse: 83 Within patient's normal limits / stable Yes   Resp: 20 Less than or equal to 30 Yes   BP: 138/62 Within patient's normal limits / minimal pressors (Hemodynamically Stable) Yes   SpO2: 94 % Greater than or equal to 90% Yes   Temp: 99.7 °F (37.6 °C) Less than 38. 5oC / 101. 3oF Yes    Sedation/paralytic weaned Yes     [x]    Based on this assessment and the Ventilator Liberation Protocol, this patient IS being placed on a Spontaneous Breathing Trial (SBT) at this time.  []    Based on this assessment and the Ventilator Liberation Protocol, this patient IS NOT being placed on a Spontaneous Breathing Trial (SBT) at this time.     SBT - Initiated at  1000    Ventilator Settings:  CPAP 5 cmH2O, Pressure Support 6 cmH2O  Tube Resistance Compensation (TRC) on    1 Minute SBT Respiratory Parameters:   VE: 7.8 L   RR: 16 b/m   VT: 420 mL (average VT = VE/RR)   RSBI: 58 (RR/VT in liters)   ETCO2:  cmH2O   SPO2: 97 %    [x]   RR is < 35 but > than 9 per minute, RSBI is < 105, SpO2 > 92%,

## 2017-09-28 NOTE — CONSULTS
restart    Past Medical History:   Diagnosis Date    Alcohol abuse     Carotid artery occlusion 09/18/2017    History of falling     Hyperlipidemia     Hypertension     Low back pain     Movement disorder     Muscle weakness (generalized)     Osteopenia     Scoliosis     lower lumbar Intervertebral Disc Displacement    Vitamin B-complex deficiency        Past Surgical History:   Procedure Laterality Date    HYSTERECTOMY      SHOULDER SURGERY Bilateral        Family History   Problem Relation Age of Onset    Heart Disease Mother     Hypertension Mother     Heart Disease Father     Cancer Father      colon    Hypertension Sister     Hypertension Brother     Other Brother      myocardial infarction        reports that she quit smoking about 11 years ago. She does not have any smokeless tobacco history on file. She reports that she does not drink alcohol or use illicit drugs. Allergies:  Celebrex [celecoxib]; Codeine; Crestor [rosuvastatin]; Ezetimibe-simvastatin; Lipitor; Nasonex [mometasone furoate];  Other; and Pcn [penicillins]    Current Medications:      cefepime (MAXIPIME) 500 mg in dextrose 5 % 50 mL IVPB Q24H   propofol 1000 MG/100ML injection Titrated   potassium (CARDIAC) replacement protocol RX Placeholder   calcium replacement protocol RX Placeholder   magnesium replacement protocol RX Placeholder   phosphorus replacement protocol RX Placeholder   enoxaparin (LOVENOX) injection 30 mg Q24H   DOBUTamine (DOBUTREX) 500 mg in dextrose 5 % 250 mL infusion Continuous   norepinephrine (LEVOPHED) 16 mg in dextrose 5% 250 mL infusion Continuous   chlorhexidine (PERIDEX) 0.12 % solution 15 mL 4x Daily   famotidine (PEPCID) injection 10 mg BID   sodium bicarbonate 150 mEq in dextrose 5 % 1,000 mL infusion Continuous   dexmedetomidine (PRECEDEX) 400 mcg in sodium chloride 0.9 % 100 mL infusion Continuous   aspirin chewable tablet 81 mg Once   clopidogrel (PLAVIX) tablet 75 mg Once   sodium

## 2017-09-28 NOTE — PROGRESS NOTES
I have reviewed the CT head. There is evidence of hyperintensity within the cerebral sulci on L. There is no loss of grey white matter junction. This is likely related to retained contrast that has not yet been cleared due to her acute oliguric ZAIRA, and NOT subarachnoid hemorrhage. Clinically pt was noted to be following commands x4 extremities w/ intact language post-procedure. Will follow. Please feel free to call with any questions. Trever Varma M.D., J.D.   Vascular Neurology and Endovascular Surgical Neuroradiology  Cell Number: [phone]

## 2017-09-28 NOTE — PROGRESS NOTES
4:28 PM Patient arrived to specials room 1 for temp cath placement. Consent signed in ICU by family since patient is intubated. 4:30 PM Patient on procedure bed and right neck prepped for dialysis temp cath placement. 4:33 PM Dr. Leela Morales to bedside. 4:39 PM Procedure complete. Catheter secured in place with sutures and 4x4 with opsite dressing. 4:46 PM Patient transported to ICU with RT, RN and Respiratory.

## 2017-09-28 NOTE — PROGRESS NOTES
Needs    Nutrition Risk Level: High    Nutrition Interventions:   Continue NPO, Start Tube Feeding  Continued Inpatient Monitoring, Education not appropriate at this time    Nutrition Evaluation:   · Evaluation: Goals set   · Goals: TF to provide % of nutrient needs while intubated. · Monitoring: NPO Status, TF Intake, TF Tolerance, Gastric Residuals, Weight, Pertinent Labs, Constipation    See Adult Nutrition Doc Flowsheet for more detail.      Electronically signed by Marielle Farris RD, LD on 9/28/17 at 3:50 PM    Contact Number: [phone]

## 2017-09-28 NOTE — PROGRESS NOTES
Pt was reevaluated in afternoon. She was noted to be tremulous w/ worsening R sided weakness. ABG performed demonstrated acidosis. Pt was reintubated. Explain events to family in detail. 32 minutes spent on care of pt. Please feel free to call with any questions. Gerry Leos M.D., J.D.   Vascular Neurology and Endovascular Surgical Neuroradiology  Cell Number: [phone]

## 2017-09-29 NOTE — PLAN OF CARE
Problem: Falls - Risk of  Goal: Absence of falls  Outcome: Ongoing  Free from falls this shift. Fall precautions remain in place at this time. Problem: Discharge Planning:  Goal: Discharged to appropriate level of care  Discharged to appropriate level of care   Outcome: Ongoing  Patient not appropriate at this time for discharge. Problem: Tissue Perfusion - Peripheral, Altered:  Goal: Absence of hematoma at arterial access site  Absence of hematoma at arterial access site   Outcome: Ongoing  Patient had previous hematoma from site. Continuing to monitor. Goal: Circulatory function of lower extremities is within specified parameters  Circulatory function of lower extremities is within specified parameters   Outcome: Ongoing  Patient continues to have palpable pulses in the right leg and pulses by doppler in the left. Problem: Pain:  Goal: Pain level will decrease  Pain level will decrease   Outcome: Ongoing  Patient able to nod yes/no when in pain. Receiving PRN pain medications as ordered and needed. Problem: Bleeding:  Goal: Will show no signs and symptoms of excessive bleeding  Will show no signs and symptoms of excessive bleeding   Outcome: Ongoing  No signs of active bleeding. Received 1 unit of PRBC    Problem: Restraint Use - Nonviolent/Non-Self-Destructive Behavior:  Goal: Absence of restraint indications  Absence of restraint indications   Outcome: Ongoing  Patient continues to pull at lines and tubes when restraints are released. Will continue to monitor. Goal: Absence of restraint-related injury  Absence of restraint-related injury   Outcome: Ongoing  No redness or swelling noted at site. No signs of injury related to restraints. Will continue to monitor. Problem: Nutrition  Goal: Optimal nutrition therapy  Outcome: Ongoing  Patient started on tube feeding this shift. Tolerating them at goal rate with minimal residuals.      Problem: Airway Clearance - Ineffective:  Goal: Ability to maintain a clear airway will improve  Ability to maintain a clear airway will improve   Outcome: Ongoing  Patient remains orally intubated     Problem: Anxiety/Stress:  Goal: Level of anxiety will decrease  Level of anxiety will decrease   Outcome: Ongoing  Patient continues to be anxious. Precedex restarted this shift     Problem: Aspiration:  Goal: Absence of aspiration  Absence of aspiration   Outcome: Ongoing  Aspiration precautions remain in place. No signs of aspiration noted this shift. Problem: Bowel Function - Altered:  Goal: Bowel elimination is within specified parameters  Bowel elimination is within specified parameters   Outcome: Ongoing  No bowel movement this shift. Hypoactive bowel sounds     Problem: Fluid Volume - Imbalance:  Goal: Absence of imbalanced fluid volume signs and symptoms  Absence of imbalanced fluid volume signs and symptoms   Outcome: Ongoing  Patient on CRRT     Comments:   Care plan reviewed with patient's son. Patient's son verbalize understanding of the plan of care and contribute to goal setting.

## 2017-09-29 NOTE — PROGRESS NOTES
09/26/17 107 lb (48.5 kg)   09/25/17 107 lb (48.5 kg)   09/18/17 115 lb (52.2 kg)      24HR INTAKE/OUTPUT:    Intake/Output Summary (Last 24 hours) at 09/29/17 8933  Last data filed at 09/29/17 0600   Gross per 24 hour   Intake          5998.24 ml   Output             4405 ml   Net          1593.24 ml       Constitutional:  Alert, awake, no apparent distress   Skin:normal   HEENT:Pupils are reactive with intact ET tube and OG tube   Neck:supple with no bruit  Cardiovascular:  S1, S2 without m/r/g   Respiratory:  Clear without wheezes or rales  Abdomen: +bs, soft, non tender  Ext: ++ LE edema  Musculoskeletal:Intact  Neuro:Awake and alert,obeys command      Electronically signed by Poncho Bond MD on 9/29/2017 at 6:21 AM

## 2017-09-29 NOTE — PROGRESS NOTES
Nutrition Assessment    Type and Reason for Visit: Reassess (TF ordering and management)    Nutrition Recommendations: Continue TF at goal as ordered. Free H20 per MD.     Malnutrition Assessment:  · Malnutrition Status:  At risk for malnutrition    Nutrition Diagnosis:   · Problem: Inadequate oral intake  · Etiology: related to Impaired respiratory function-inability to consume food     Signs and symptoms:  as evidenced by NPO status due to medical condition, Intubation, Nutrition support - EN    Nutrition Assessment:  · Subjective Assessment: Pt. continues on vent with TF at goal and tolerating well today; pt. received 36% of Rx TF volume past 24h; meds include ATB;on CVVH; glucose 143, BUN 24, creatinine 1.5, phosphorus 2.3; BM 0; ZAIRA due to hypotension  · Wound Type: None  · Current Nutrition Therapies:  · Oral Diet Orders: NPO   · Tube Feeding (TF) Orders:   · Feeding Route: Orogastric  · Formula: Renal (Nepro)  · Rate (ml/hr):25ml/hour increase 10ml in 4h if tolerating well to goal of 35ml/hour    · Volume (ml/day): 840ml at goal  · Duration: Continuous 24hrs  · TF Residuals: Not applicable (starting today)  · Water Flushes:  (per MD)  · Goal TF & Flush Orders Provides: 1512 kcals, 68 grams protein, 135 grams CHO/24h (critical pt)  · Anthropometric Measures:  · Ht: 5' 3\" (160 cm)   · Current Body Wt: 128 lb (58.1 kg) (9/29 with +2 edema)  · Admission Body Wt: 107 lb (48.5 kg) (9/27 stated)  · Usual Body Wt: 124 lb (56.2 kg) (11/10/14 per EMR)  · % Weight Change: 17# or 14%,  ~2 years  · Ideal Body Wt: 115 lb (52.2 kg), % Ideal Body 111%  · BMI Classification: BMI 18.5 - 24.9 Normal Weight (22.7)  · Comparative Standards (Estimated Nutrition Needs):  · Estimated Daily Total Kcal: 8297-3601 (30-32/kgm admit wt. of 49kgm)  · Estimated Daily Protein (g): 58-68 grams (1.2-1.4/kgm admit wt. of 49kgm)    Estimated Intake vs Estimated Needs: Intake Improving    Nutrition Risk Level: High    Nutrition Interventions:   Continue NPO, Continue current Tube Feeding  Continued Inpatient Monitoring, Education not appropriate at this time    Nutrition Evaluation:   · Evaluation: Progressing toward goals   · Goals: TF to provide % of nutrient needs while intubated. · Monitoring: NPO Status, TF Intake, TF Tolerance, Gastric Residuals, Weight, Pertinent Labs, Constipation    See Adult Nutrition Doc Flowsheet for more detail.      Electronically signed by Purvi Cortez RD, LD on 9/29/17 at 1:32 PM    Contact Number: [phone]

## 2017-09-29 NOTE — PROGRESS NOTES
[facility] 60  PHYSICAL THERAPY MISSED TREATMENT NOTE  ACUTE CARE    Date: 2017  Patient Name: Indiana Grant        MRN: [id number]   : 1935  (80 y.o.)  Gender: female                REASON FOR MISSED TREATMENT:  Hold treatment per nursing request.  Pt was reintubated last night and is on CVVH (continuous hemodialysis). Will hold and watch for orders to resume. Ana Funes.  [employer], [place] [address]

## 2017-09-29 NOTE — PLAN OF CARE
Problem: RESPIRATORY  Goal: Will be able to breathe spontaneously, without ventilator support  Will be able to breathe spontaneously, without ventilator support   Outcome: Not Met This Shift  Patient remains on vent at this time. We will continue to monitor patient for weaning readiness. VENTILATOR LIBERATION PROTOCOL     PRE-TRIAL PATIENT ASSESSMENT - COMPLETED AT 0830     Ventilatory Assessment:      PARAMETER CRITERIA FOR WEANING   Reversal  of the acute disease process that prompted intubation: No At least partial or complete reversal   FiO2 : 25 % FIO2 less than or equal to 50%     PEEP less than or equal to 5 cm H2O   Hemodynamic stability: No Dopamine or Dobutamine at 5 mcg/kg/minute or less   Adequate correction of electrolytes, Hgb, and HCT: No Within lab range   Neurologic stability: No Ability to cough, voluntarily initiate ventilator effort, cooperate with pulmonary toilet measures      ABG:   Lab Results   Component Value Date     PH 7.52 09/29/2017     PO2 75 09/29/2017     PCO2 38 09/29/2017     HCO3 32 09/29/2017     O2SAT 96 09/29/2017     HGB/WBC:  Lab Results   Component Value Date     HGB 7.3 (L) 09/29/2017     WBC 14.6 (H) 09/29/2017                    Vital Signs:      PARAMETER CRITERIA FOR WEANING Meets Criteria   Pulse: 85 Within patient's normal limits / stable Yes   Resp: 25 Less than or equal to 30 Yes   BP: (!) 129/55 Within patient's normal limits / minimal pressors (Hemodynamically Stable) No   SpO2: 96 % Greater than or equal to 90% Yes   Temp: 97.9 °F (36.6 °C) Less than 38. 5oC / 101. 3oF Yes     Sedation/paralytic weaned No      [ ]     Based on this assessment and the Ventilator Liberation Protocol, this patient IS being placed on a Spontaneous Breathing Trial (SBT) at this time. [X]        Based on this assessment and the Ventilator Liberation Protocol, this patient IS NOT being placed on a Spontaneous Breathing Trial (SBT) at this time.

## 2017-09-29 NOTE — PROGRESS NOTES
Hours   General: started on CVVH   Cardiovascular: weaned off pressors; no acute events reported in last 24 hours  Neurologic: no acute events reported in last 24 hours  Respiratory: on MV; no acute events reported in last 24 hours    All other systems reviewed  Vitals    height is 5' 3\" (1.6 m) and weight is 128 lb 1.4 oz (58.1 kg). Her core temperature is 98.2 °F (36.8 °C). Her blood pressure is 127/42 (abnormal) and her pulse is 85. Her respiration is 24 and oxygen saturation is 94%.      Temp: Temp: 98.2 °F (36.8 °C)Temp  Av.2 °F (36.8 °C)  Min: 97.7 °F (36.5 °C)  Max: 99.3 °F (07.2 °C)  BP:  Systolic (46HHX), LQV:960 , Min:92 , VHN:855   Diastolic (76ZCW), BTO:84, Min:40, Max:134    HR: Pulse  Av.1  Min: 77  Max: 103  Resp: Resp  Av.8  Min: 17  Max: 30SpO2: 94 %   24HR Pulse Ox Range:  SpO2  Av.5 %  Min: 92 %  Max: 100 %O2 Flow Rate (L/min): 2 L/min  Patient Vitals for the past 8 hrs:   BP Temp Temp src Pulse Resp SpO2   17 1801 (!) 127/42 - - 85 24 94 %   17 1732 (!) 131/43 - - 80 25 93 %   17 1702 (!) 136/49 - - 83 17 93 %   17 1632 (!) 131/46 - - 87 18 96 %   17 1601 (!) 123/55 98.2 °F (36.8 °C) CORE 87 20 98 %   17 1600 - - - 85 24 98 %   17 1503 (!) 144/51 - - 103 27 96 %   17 1432 (!) 133/52 - - 87 18 95 %   17 1402 (!) 132/57 - - 88 25 98 %   17 1330 (!) 129/55 - - 85 25 96 %   17 1302 (!) 113/52 97.9 °F (36.6 °C) CORE 94 21 97 %   17 1232 (!) 124/53 - - 86 25 96 %   17 1200 (!) 126/50 - - 94 18 95 %   17 1158 - - - 91 30 94 %   17 1130 (!) 125/52 97.7 °F (36.5 °C) CORE 91 18 95 %   17 1102 (!) 92/40 - - 89 25 95 %   17 1100 - 97.9 °F (36.6 °C) CORE - - -       I/O     Intake/Output Summary (Last 24 hours) at 17 1840  Last data filed at 17 1700   Gross per 24 hour   Intake          9182.24 ml   Output             7193 ml   Net          1989.24 ml     I/O last 3 completed shifts: In: 8080.2 [I.V.:7103.2; Blood:310; NG/GT:579; IV Piggyback:88]  Out: 1789 [Urine:241]     Date 09/29/17 0000 - 09/29/17 2359   Shift 2568-1590 0262-7169 9807-6803 24 Hour Total   I  N  T  A  K  E   P.O.  (mL/kg/hr) 0  (0)   0    I.V.  (mL/kg) 2861  (49.2) 2708  (46.6) 1064  (18.3) 6633  (114.2)    Blood  (mL/kg) 196  (3.4)   196  (3.4)    NG/GT  (mL/kg) 248  (4.3) 277  (4.8) 38  (0.7) 563  (9.7)    IV Piggyback  (mL/kg) 88  (1.5)   88  (1.5)    Shift Total  (mL/kg) 3393  (58.4) 2985  (51.4) 1102  (19) 7480  (128.7)   O  U  T  P  U  T   Urine  (mL/kg/hr) 93  (0.2) 58  (0.1) 14 165    CRRT 4192 2067 400 6659    Shift Total  (mL/kg) 4285  (73.8) 2125  (36.6) 414  (7.1) 6824  (117.5)   Weight (kg) 58.1 58.1 58.1 58.1     Patient Vitals for the past 96 hrs (Last 3 readings):   Weight   09/29/17 0603 128 lb 1.4 oz (58.1 kg)   09/26/17 0550 107 lb (48.5 kg)       Mechanical Ventilation Data   Vent Information  Ventilator Started: Yes  Ventilation Day(s): 3  Vent Type: C2G5 Graves  Vent Mode: APVCMV  Vt Ordered: 400 mL  Vt Exhaled: 529 mL  Rate Set: 25 bmp  Rate Measured: 27 br/min  Minute Volume: 10.9 Liters  Peak Flow: 42.7 L/min  Pressure Support: 6 cmH20  FiO2 : 25 %  Peak Inspiratory Pressure: 25 cmH2O  I:E Ratio: 1:2.4  Sensitivity: 3  PEEP/CPAP: 5  I Time/ I Time %: 0.7 s  High Pressure Alarm: 40 cmH2O  Low Minute Volume Alarm: 4 L/min  Low Exhaled Vt : 200 mL  High Respiratory Rate: 40 br/min  Mean Airway Pressure: 10 cmH20  Plateau Pressure:  (unable to obtain)  Static Compliance: 0 mL/cmH2O  Dynamic Compliance: 0 mL/cmH2O  Auto PEEP: 0 cmH20  HFOV In Use?: No  Nitric Oxide/Epoprostenol In Use?: No  Additional Respiratory  Assessments  Pulse: 85  Resp: 24  SpO2: 94 %  Position: Semi-Nielson's  HME (Heat moisture exchanger):  Yes  Circuit Condensation: Drained  Oral Care Completed?: No  Oral Care: Suction to[place]te, Mouth moisturizer, Lip moisturizer applied  Subglottic Suction Done?: Yes  Cuff Pressure (cm H2O): 25 cm H2O  ABG   Lab Results   Component Value Date    PH 7.52 09/29/2017    PCO2 38 09/29/2017    PO2 75 09/29/2017    HCO3 32 09/29/2017    O2SAT 96 09/29/2017     Lab Results   Component Value Date    IFIO2 25 09/29/2017    MODE AC 09/29/2017    SETTIDVOL 400 09/29/2017    SETPEEP 5.0 09/29/2017     Medications      citrate dextrose 150 mL/hr at 09/29/17 1810    IV infusion builder 100 mL/hr at 09/29/17 2274    sodium chloride      calcium chloride CRRT infusion 8 g (09/29/17 1809)    prismaSATE BGK 2/0 500 mL/hr (09/29/17 0530)    propofol Stopped (09/27/17 1045)    DOBUTamine 5 mcg/kg/min (09/29/17 0728)    norepinephrine      dexmedetomidine (PRECEDEX) IV infusion 0.2 mcg/kg/hr (09/29/17 0124)      cefepime  2 g Intravenous Q12H    [START ON 9/30/2017] epoetin  1,000 Units Subcutaneous Once per day on Tue Thu Sat    calcium replacement protocol   Other RX Placeholder    magnesium replacement protocol   Other RX Placeholder    phosphorus replacement protocol   Other RX Placeholder    chlorhexidine  15 mL Mouth/Throat 4x Daily    famotidine (PEPCID) injection  10 mg Intravenous BID    aspirin  81 mg Oral Once    clopidogrel  75 mg Oral Once    sodium chloride  250 mL Intravenous Once     IV  Diet/Nutrition   DIET TUBE FEED CONTINUOUS/CYCLIC NPO; Renal Formula; Orogastric; Continuous; 25; 35    Exam   VITALS    height is 5' 3\" (1.6 m) and weight is 128 lb 1.4 oz (58.1 kg). Her core temperature is 98.2 °F (36.8 °C). Her blood pressure is 127/42 (abnormal) and her pulse is 85. Her respiration is 24 and oxygen saturation is 94%. General Appearance  Sedated on MV  HEENT - Life support devices in place (ET, OG), PERRL  Lungs - Chest expands equally, no wheezes, rales or rhonchi. Cardiovascular - eart sounds are normal. Normal rate and rhythm regular,  no murmur, gallop or rub. Abdomen - Soft, nontender, nondistended, no masses or organomegaly  Neurologic - GCS [id number]=9L.    Extremities

## 2017-09-29 NOTE — PROGRESS NOTES
Pharmacy Vancomycin Consult     Vancomycin Day: 2  Current Dosing: Intermittent  Date Time Vancomycin dose Vancomycin Random   9/27/17 2132 750 mg    9/28/17 2040  8.9                                       Temp max:  99.7    Recent Labs      09/28/17   1200  09/28/17 2040   BUN  35*  35*       Recent Labs      09/28/17   1200  09/28/17 2040   CREATININE  2.3*  2.4*       Recent Labs      09/28/17   0530  09/28/17 2040   WBC  17.1*  16.5*         Intake/Output Summary (Last 24 hours) at 09/28/17 2202  Last data filed at 09/28/17 2057   Gross per 24 hour   Intake 3630.24 ml   Output 320 ml   Net 3310.24 ml     Culture Date Source Results   9/27/17 SC Gm stain - many GPC   9/27/17 UC NGTD   9/28/17 SC Gm stain - few GPC       Ht Readings from Last 1 Encounters:   09/26/17 5' 3\" (1.6 m)        Wt Readings from Last 1 Encounters:   09/26/17 107 lb (48.5 kg)         Body mass index is 18.95 kg/(m^2). Estimated Creatinine Clearance: 14 mL/min (based on Cr of 2.4). Trough: 8.9    Assessment/Plan:  Patient was started on CVVH today. Will give Vancomycin 750 mg once. Random vancomycin level will be drawn in 48 hours.     Debbie Bronson RPh  9/28/2017  10:20 PM

## 2017-09-29 NOTE — PLAN OF CARE
Problem: Nutrition  Goal: Optimal nutrition therapy  Outcome: Ongoing  Nutrition Problem: Inadequate oral intake  Intervention: Food and/or Nutrient Delivery: Continue NPO, Continue current Tube Feeding  Nutritional Goals: TF to provide % of nutrient needs while intubated.

## 2017-09-29 NOTE — PLAN OF CARE
Problem: RESPIRATORY  Goal: Will be able to breathe spontaneously, without ventilator support  Will be able to breathe spontaneously, without ventilator support   Outcome: Not Met This Shift  VENTILATOR LIBERATION PROTOCOL     PRE-TRIAL PATIENT ASSESSMENT - COMPLETED AT 05:17     Ventilatory Assessment:      PARAMETER CRITERIA FOR WEANING   Reversal  of the acute disease process that prompted intubation: No At least partial or complete reversal   FiO2 : 25 % FIO2 less than or equal to 50%     PEEP less than or equal to 5 cm H2O   Hemodynamic stability: No Dopamine or Dobutamine at 5 mcg/kg/minute or less   Adequate correction of electrolytes, Hgb, and HCT: Yes and No Within lab range   Neurologic stability: No Ability to cough, voluntarily initiate ventilator effort, cooperate with pulmonary toilet measures      ABG:   Lab Results   Component Value Date     PH 7.52 09/29/2017     PO2 75 09/29/2017     PCO2 38 09/29/2017     HCO3 32 09/29/2017     O2SAT 96 09/29/2017      HGB/WBC:  Lab Results   Component Value Date     HGB 8.3 (L) 09/29/2017     WBC 17.1 (H) 09/29/2017                    Vital Signs:      PARAMETER CRITERIA FOR WEANING Meets Criteria   Pulse: 88 Within patient's normal limits / stable Yes   Resp: 20 Less than or equal to 30 Yes   BP: (!) 127/53 Within patient's normal limits / minimal pressors (Hemodynamically Stable) No   SpO2: 96 % Greater than or equal to 90% Yes   Temp: 98.1 °F (36.7 °C) Less than 38. 5oC / 101. 3oF Yes     Sedation/paralytic weaned No      [ ]     Based on this assessment and the Ventilator Liberation Protocol, this patient IS being placed on a Spontaneous Breathing Trial (SBT) at this time. [X]        Based on this assessment and the Ventilator Liberation Protocol, this patient IS NOT being placed on a Spontaneous Breathing Trial (SBT) at this time.

## 2017-09-29 NOTE — PLAN OF CARE
Problem: Falls - Risk of  Goal: Absence of falls  Outcome: Ongoing  Remains on bedrest        Problem: Discharge Planning:  Goal: Discharged to appropriate level of care  Discharged to appropriate level of care   Outcome: Ongoing  Remains on vent sedated        Problem: Tissue Perfusion - Peripheral, Altered:  Goal: Absence of hematoma at arterial access site  Absence of hematoma at arterial access site   Outcome: Ongoing  No hematoma noted      Goal: Circulatory function of lower extremities is within specified parameters  Circulatory function of lower extremities is within specified parameters   Outcome: Ongoing  Pulses are checked per doppler        Problem: Pain:  Goal: Pain level will decrease  Pain level will decrease   Outcome: Ongoing  Patient denies pain nods no when questioned      Goal: Control of acute pain  Control of acute pain   Outcome: Ongoing  Morphine prn  Goal: Control of chronic pain  Control of chronic pain   Outcome: Ongoing  Pain medication and repositioning        Problem: Bleeding:  Goal: Will show no signs and symptoms of excessive bleeding  Will show no signs and symptoms of excessive bleeding   Outcome: Ongoing  No bleeding noted H/H being checked q 4 hours        Problem: Restraint Use - Nonviolent/Non-Self-Destructive Behavior:  Goal: Absence of restraint indications  Absence of restraint indications   Outcome: Ongoing  Patient attempts to pull at tubing with left hand despite restraint usage      Goal: Absence of restraint-related injury  Absence of restraint-related injury   Outcome: Ongoing  No injury noted left wrist        Problem: Skin Integrity - Impaired  Goal: Wound size and drainage will decrease and surrounding tissue/skin remains healthy and intact  Outcome: Ongoing    Problem: Airway Clearance - Ineffective:  Goal: Ability to maintain a clear airway will improve  Ability to maintain a clear airway will improve   Outcome: Ongoing  Suctioning on vent prn    Problem: Anxiety/Stress:  Goal: Level of anxiety will decrease  Level of anxiety will decrease   Outcome: Ongoing  Patient remains on precedex     Problem: Bowel Function - Altered:  Goal: Bowel elimination is within specified parameters  Bowel elimination is within specified parameters   Outcome: Ongoing  No bowel movement noted        Problem: Cardiac Output - Decreased:  Goal: Hemodynamic stability will improve  Hemodynamic stability will improve   Outcome: Ongoing  Continuous Nicom monitoring        Problem: Fluid Volume - Imbalance:  Goal: Absence of imbalanced fluid volume signs and symptoms  Absence of imbalanced fluid volume signs and symptoms   Outcome: Ongoing  Monitoring hourly        Problem: Gas Exchange - Impaired:  Goal: Levels of oxygenation will improve  Levels of oxygenation will improve   Outcome: Ongoing  Vent settings are unchanged patient does not require any increased O2 needs    Comments:   Care plan reviewed with patient.   Patient's family verbalize understanding of the plan of care and does contribute to goal setting

## 2017-09-30 NOTE — PROGRESS NOTES
Assessment completed, pt care for by (2) RN's with asstistance of resp therapist r/t citrate protocol       6960  Family in, update given, plan of care reviewed, family asking \"If the doctor has been in, they want to talk to him\", RN advises that we will come get them when he comes in. Family pleased    56  Dr Shivani Ye here to see pt, speaks with pts family about the plan of care, dobutamine decreased to 2.5 mcg    1015  Blood drawn via fem a-line, flushed, sent to lab    1040  CVVH machine alarming, unable to restart, filter clotted, full of debri, calcium and citrate infusions stopped    1047  Paged Dr Mijares Going with update    [address]  Dr Mijares Going walkiing into the room, talking with Dr Shivani Ye, Dr Shivani Ye talking with family, decision made to stopped  CVVH machine at this time.   Family updated    [phone]  More family into see pt,

## 2017-09-30 NOTE — PLAN OF CARE
Problem: Falls - Risk of  Goal: Absence of falls  Outcome: Ongoing  Pt safe in bed with fall band and fall sign posted     Problem: Discharge Planning:  Goal: Discharged to appropriate level of care  Discharged to appropriate level of care   Outcome: Ongoing  Pt lives with  at  Home     Problem: Tissue Perfusion - Peripheral, Altered:  Goal: Circulatory function of lower extremities is within specified parameters  Circulatory function of lower extremities is within specified parameters   Outcome: Ongoing  Pt using multiple drugs to maintain hemodynamics     Problem: Pain:  Goal: Pain level will decrease  Pain level will decrease   Outcome: Ongoing  Pt given morphine, when noding to pain     Problem: Bleeding:  Goal: Will show no signs and symptoms of excessive bleeding  Will show no signs and symptoms of excessive bleeding   Outcome: Ongoing  No bleeding observed at this time     Problem: Restraint Use - Nonviolent/Non-Self-Destructive Behavior:  Goal: Absence of restraint indications  Absence of restraint indications   Outcome: Ongoing  Restraint needed on left arm at this time     Problem: Nutrition  Goal: Optimal nutrition therapy  Outcome: Ongoing  continous TF running at this time rate at goal     Problem: Airway Clearance - Ineffective:  Goal: Ability to maintain a clear airway will improve  Ability to maintain a clear airway will improve   Outcome: Ongoing  Oral ETT    Problem: Anxiety/Stress:  Goal: Level of anxiety will decrease  Level of anxiety will decrease   Outcome: Ongoing  Pt with anxiety, precedex increased for anxiety     Problem: Bowel Function - Altered:  Goal: Bowel elimination is within specified parameters  Bowel elimination is within specified parameters   Outcome: Ongoing  Pt had 2 bm's during previous night shift     Comments:   Care plan reviewed with patient and family. Patient and family  verbalize understanding of the plan of care and contribute to goal setting.

## 2017-09-30 NOTE — PROGRESS NOTES
09/25/17 107 lb (48.5 kg)   09/18/17 115 lb (52.2 kg)           Physical Exam :  General Appearance:  Well developed. No distress  Mouth/Throat:  Oral mucosa moist  Neck:  Supple, no JVD  Lungs:  Breath sounds: clear  Heart[de-identified]  S1,S2 heard  Abdomen:  Soft, non - tender  Musculoskeletal:  Edema - none         Last 3 CBC   Recent Labs      09/29/17   2200  09/30/17   0415  09/30/17   1015   WBC  13.2*  12.8*  13.4*   RBC  2.21*  2.73*  2.61*   HGB  6.7*  8.1*  7.9*   HCT  19.4*  23.8*  22.5*   PLT  40*  40*  30*     Last 3 CMP  Recent Labs      09/27/17   1834  09/28/17   0530   09/29/17   1600  09/29/17   2200  09/30/17   0415   NA  139  139   < >  141  141  141   K  4.9  3.7   < >  3.9  3.8  3.6   CL  110  107   < >  99  99  97*   CO2  14*  19*   < >  32  33  33   BUN  32*  36*   < >  21  18  16   CREATININE  2.3*  2.2*   < >  1.1  1.0  0.9   CALCIUM  6.9*  7.4*   < >  8.2*  8.5  8.8   LABALBU  1.9*  1.8*   --    --    --    --    BILITOT  0.2*  0.3   --    --    --    --     < > = values in this interval not displayed. Assessment / Plan   Renal - ZAIRA - has been on CVVH for more than 48 hours  · Currently will hold further CVVH  · All the metabolic parameters are reasonable and also no volume overload as well. · D/W family and Dr. Santhosh Shaw as well    Electrolytes - WNL  Metabolic alkalosis - may be due citrate, dose decreased last night currently citrate is off. Will follow Ph  Vent dependent respiratory failure  Hypotension  Anemia - given 1 unit yesterday  S/P carotid stenting    SHRUTHI Hudson D.  [facility].

## 2017-09-30 NOTE — PROGRESS NOTES
Dr Edinson Morse at bedside. bp now 152/60, . Levo off.  CI 3.2.   precedex off per dr Edinson Morse order. Informed that family does not want her lying in bed miserably. CI remains 2.9-3 - dobutrex decreased to 1.25 mcg.  1902-dobutrex weaned off.  1905-2mg narcan given. Pt opens eyes but does not follow commands or nod appropriately. Dr Edinson Morse and dr Belinda Bates to speak with family for update/plan of care. Order received for 2 units prbc and 1 unit platelets.

## 2017-09-30 NOTE — PROCEDURES
Procedure: Left Internal Carotid Artery Stent Placement with Distal Embolic Protection Device Deployment. Physician: Dr. Sunshine Lino MD  Indication for Procedure: H  Pre-treatment Stenosis= 75%  Post-treatment Stenosis= no residual stenosis  NIHSS pre-procedure= 0  MRS pre-procedure= 0  NIHSS post-procedure= 0  MRS post-procedure= 0  Fluoroscopy Time: 58 minutes  Contrast: 275 cc of Isovue 300  Medications: Fentanyl, Versed, Lidocaine, Atropine, Heparin  Complications: None  Tasks:  -Catheterization of the right common femoral artery.  -Catheterization of the right radial artery  -Selective catheterization of the left common carotid artery with    cervical carotid and cerebral angiography.  -Stenting of the left internal carotid artery with distal embolic protection device.  -Angioplasty of the left internal carotid artery. Consent:  Material risks/benefits/indications reviewed with patient and/or family including but not limited to a 5% risk of stroke (mild, moderate, or fatal) which may be related to the actual procedure and/or equipment malfunction, a 0.5% risk of femoral artery injury (possibly requiring blood transfusion and/or surgery), dye-related nephropathy, and dye-related anaphylaxis. Patient and/or family understood these risks and wished to proceed. Moreover, diagnostic and treatment alternatives were discussed in depth with patient and/or family including conservative medical management. Decision was made to proceed with intervention. Witnessed written consent was obtained. Description of Procedure: The patient was brought to angiogram suite. The patient's groin was draped and prepared under sterile conditions. 10 cc of lidocaine 1% was injected subcutaneously into the right groin. Using a micropuncture kit, femoral artery access was successfully obtained using the Seldinger technique. The micropuncture needle was exchanged for a 4 Danish dilator and subsequently an 8 Danish sheath.   The sheath was then connected to a continuous heparinized saline flush. Next, under fluoroscopic guidance, a guide catheter was introduced into the body through the sheath and advanced retrograde over a slip glide catheter to the ascending arch. Several attempts were made to advance the guide catheter over the slip glide catheter, however, due to the acute angle of the L ICA take-off (type 3 arch), this could not be done. At this point, R radial access was successfully obtained using the Seldinger technique and secured with 6 fr access. At this point, a glide catheter was then advanced retrograde into the left common carotid artery. From this location, biplane cervical and cerebral angiography was performed. There was again notable stenosis of the proximal L internal carotid artery. At this point, decision was made to proceed with stenting. The guide catheter was advanced over the slip glide catheter into the common carotid artery. An exchange length . 014 wire was advanced through the glide catheter and parked in the L ECA. The glide catheter was removed. Over the exchange length . 014 wire and under roadmap guidance, a 4-7 mm emboshield was advanced over a .014 microwire and deployed in the petrous portion of the ICA. Next, after pre-treatment with atropine, the stent was advanced over the microwire to the location of the stenosis. Once adequate positioned was ensured, the stent was deployed. A repeat angiogram demonstrated successful deployment with improved flow, however, there remained kinking/constriction of the stent. Thus, a balloon was inflated to nominal pressure. The balloon was deflated and removed from circulation. At this point, repeat angiography demonstrated improved patency of the stent. There was significantly improved flow through the left ICA. The emboshield was subsequently removed from circulation.  At this point, repeat cerebral angiography was performed which demonstrated improved flow

## 2017-09-30 NOTE — PLAN OF CARE
Problem: RESPIRATORY  Goal: Will be able to breathe spontaneously, without ventilator support  Will be able to breathe spontaneously, without ventilator support   Outcome: Not Met This Shift  VENTILATOR LIBERATION PROTOCOL     PRE-TRIAL PATIENT ASSESSMENT - COMPLETED AT 0528     Ventilatory Assessment:      PARAMETER CRITERIA FOR WEANING   Reversal  of the acute disease process that prompted intubation: No At least partial or complete reversal   FiO2 : 30 % FIO2 less than or equal to 50%     PEEP less than or equal to 5 cm H2O   Hemodynamic stability: No Dopamine or Dobutamine at 5 mcg/kg/minute or less   Adequate correction of electrolytes, Hgb, and HCT: No Within lab range   Neurologic stability: No Ability to cough, voluntarily initiate ventilator effort, cooperate with pulmonary toilet measures      ABG:   Lab Results   Component Value Date     PH 7.52 09/30/2017     PO2 93 09/30/2017     PCO2 45 09/30/2017     HCO3 37 09/30/2017     O2SAT 98 09/30/2017     HGB/WBC:  Lab Results   Component Value Date     HGB 8.1 (L) 09/30/2017     WBC 12.8 (H) 09/30/2017                    Vital Signs:      PARAMETER CRITERIA FOR WEANING Meets Criteria   Pulse: 87 Within patient's normal limits / stable Yes   Resp: 25 Less than or equal to 30 Yes   BP: (!) 101/48 Within patient's normal limits / minimal pressors (Hemodynamically Stable) No   SpO2: 100 % Greater than or equal to 90% Yes   Temp: 97.7 °F (36.5 °C) Less than 38. 5oC / 101. 3oF Yes     Sedation/paralytic weaned No      [ ]     Based on this assessment and the Ventilator Liberation Protocol, this patient IS being placed on a Spontaneous Breathing Trial (SBT) at this time. [X]        Based on this assessment and the Ventilator Liberation Protocol, this patient IS NOT being placed on a Spontaneous Breathing Trial (SBT) at this time.

## 2017-09-30 NOTE — PROGRESS NOTES
2000-Assess completed, pt with eyes open and tracking RN-mostly to to left and midline, does not turn head to R however. Pt does have significant kyphosis/scoliosis and back pain present prior to admission. Attempt numerous times to get pt to follow commands with RUE, will not, does not wd to painful stim either however do note a sm amt of muscle tension in the R shoulder. Able to wiggle toes on both feet, R foot has less ROM until pt stimulated a large amt and then R foot is equal with L foot. Both feet very sensitive to touch as well. LUE with lg amts of mvmt, pt becomes restless then agitated with RN working with this arm and freq tries to pull gown, tele wires, NICOM wires and CVC out if not watched closely even while restrained. 2026-Medicated with Morphine IV prn for pain prior to repositioning/turning/back care, CPOT 5 and answers yes to pain. 2230-Dr Cheema updated per Deepthi Snell RN with again max suggested dose of Citrate reached using protocol, as well as critical H/H result. See orders. 0030-Dr Cheema in to see pt, updated, no need to text hourly regarding max dose of Citrate, cont to use protocol to dose Calcium and do hourly checks after reviewing Citrate protocol. 0400-Assess completed, pts neuro assess-no HG on R but does have some gross motor movement, able to move R foot and toes to command; PERRLA; pt is able to move L arm/hand to command and as wants to and grabs/pulls at CVC, gown, tele wires, etc. Pt reassured that she is safe, and will not fall as she also appears very fearful while care given. Do note that it takes multiple commands to elicit these responses, and pt is able to open hand to command if RN shows pt what activity to do. 0615-Dr Cheema sent text to update with labs, ABG results. 0653-Dr Cheema calls in and updated with labs, ABG's, etc, see orders.

## 2017-09-30 NOTE — PROGRESS NOTES
CI 1.7-1.9 - bp 74/41 (53). Pt less responsive. Levo started at [address]. dobutrex started at 2.5mcg. Dr Ramana Castro aware.

## 2017-09-30 NOTE — PLAN OF CARE
Problem: Falls - Risk of  Goal: Absence of falls  Outcome: Ongoing  Free from falls this shift. Fall precautions remain in place at this time. Problem: Discharge Planning:  Goal: Discharged to appropriate level of care  Discharged to appropriate level of care   Outcome: Ongoing  Patient remains intubated, sedated, and on CRRT. Not appropriate for discharge at this time     Problem: Tissue Perfusion - Peripheral, Altered:  Goal: Absence of hematoma at arterial access site  Absence of hematoma at arterial access site   Outcome: Completed Date Met:  09/29/17  Goal: Circulatory function of lower extremities is within specified parameters  Circulatory function of lower extremities is within specified parameters   Outcome: Ongoing  Pulses remain by doppler on the left. Problem: Pain:  Goal: Pain level will decrease  Pain level will decrease   Outcome: Ongoing  Patient receiving PRN pain medication as ordered and as needed. Problem: Bleeding:  Goal: Will show no signs and symptoms of excessive bleeding  Will show no signs and symptoms of excessive bleeding   Outcome: Ongoing  No signs of active bleeding noted this shift. Will continue to monitor lab work. Problem: Restraint Use - Nonviolent/Non-Self-Destructive Behavior:  Goal: Absence of restraint indications  Absence of restraint indications   Outcome: Ongoing  Patient continues to pull at lines and tubes when restraints are released. Will continue to monitor. Goal: Absence of restraint-related injury  Absence of restraint-related injury   Outcome: Ongoing  No redness or swelling noted at site. No signs of injury related to restraints. Will continue to monitor. Problem: Nutrition  Goal: Optimal nutrition therapy  Outcome: Ongoing  Patient remains on tube feedings.   Tolerating them at goal rate     Problem: Airway Clearance - Ineffective:  Goal: Ability to maintain a clear airway will improve  Ability to maintain a clear airway will improve   Outcome: Ongoing  Patient remains orally intubated     Problem: Anxiety/Stress:  Goal: Level of anxiety will decrease  Level of anxiety will decrease   Outcome: Met This Shift  Lots of emotional support given. Problem: Aspiration:  Goal: Absence of aspiration  Absence of aspiration   Outcome: Ongoing  Aspiration precautions remain in place. No signs of aspiration noted this shift. Problem: Bowel Function - Altered:  Goal: Bowel elimination is within specified parameters  Bowel elimination is within specified parameters   Outcome: Ongoing  Active bowel sounds, small bowel movement this shift. Problem: Cardiac Output - Decreased:  Goal: Hemodynamic stability will improve  Hemodynamic stability will improve   Outcome: Ongoing  Patient remains on dobutamine     Problem: Gas Exchange - Impaired:  Goal: Levels of oxygenation will improve  Levels of oxygenation will improve   Outcome: Met This Shift    Comments:   Care plan reviewed with patient's son. Patient's son verbalize understanding of the plan of care and contribute to goal setting.

## 2017-10-01 NOTE — PROGRESS NOTES
1930-Resting in bed, eyes open, VSS. 2020-Dr Scott Kan calls in for update, hemodynamics reviewed. [id number] completed, pts eyes open and looks at RN while on her L side but will not while on her R side. L arm with much movement, and does do HG after multiple requests but needs to be shown how to do so. Pt also moves both feet, however need to stim plantar surface to do so and then has very sensitive feet, also wd to nailbed stim. R hand and arm are however flaccid with no wd to pain. PERRLA, pos cough and gag. Lungs with rhonci, cl with suction, sputum yellow. T 38.5 at present, will give Tylenol. Hypoactive BS noted, pt has however had a very lg BM-yellow/brown incont. Color pale, cool to touch, toes and distal foot still dusky in color with poor cap refill, pulses are by dopplar bilat LE's. CO/CI via NICOM is adequate at present, Turn/back care/skin care/linen change completed, robe fair. Does reach at things freq and attempt to dislodge them with LH. Will remain restrained. 2135-2150-Platelet transfusion started per policy, no s/s transfusion reaction noted in first 15 mins. 2220-Dr Scott Kan calls in, update given, orders received. [id number] taken to CT for stat CT of head, returns to room without incident. Back on bedside monitor, sent for first unit PRBC as Platelets are completed. 2305-Dr Anaya Fox calls in and updated with pt hemodynamics, labs, etc.  [id number]-Gxdma unit PRBC started, no s/s transfusion reaction noted in first 15 mins. 0159-0215-Second unit PRBC started, no s/s transfusion reaction noted within first 15 mins. 0228-Dr Scott Kan sent text with CT results. 0400-Assess completed, neuro assess remains unchanged, will not follow any commands consistently with the LUE but is consistently attempting to grasp lines/ETT/OGT and dislodge any of them she can. Do note however after pt has gotten extremely agitated there is some random movement of the R shoulder and upper arm.  Also some

## 2017-10-01 NOTE — PROGRESS NOTES
Called SHRUTHI ambrosio regarding pt bedside monitor showing t wave inversion in avf lead- present in pt overnight and throughout today - will obtain  ekg and follow pt .  States pt might need further workup later

## 2017-10-01 NOTE — PLAN OF CARE
Problem: Restraint Use - Nonviolent/Non-Self-Destructive Behavior:  Goal: Absence of restraint indications  Absence of restraint indications   Outcome: Met This Shift  Extubated - no longer pulling at tubes or lines - cooperative   Goal: Absence of restraint-related injury  Absence of restraint-related injury   Outcome: Met This Shift  No red or open areas

## 2017-10-01 NOTE — PROGRESS NOTES
Pt assessment done- continues on vent - no distress. Wiggles toes bilaterally to command. Has spontaneous  hand grasp on left to command - does not release- occasional random gross motor movement right arm noted- follows no commands with right arm . Eyes open spontaneously . Mouths wors at times. Dr Lovely Otoole informed of consult   4611 Dr Ruben Oliveros in to see- complete update given - will try cpap today   0900 first cpap trial failed  due to apneic periods - rt spoke with Dr Miranda Running regarding trila - rate decreased to 6 on vent per rt as ordered . 1120 abg done on rate of 6 - shown to Dr Ruben Oliveros per rt-  Instructed to place on cpap  1130 placed on cpap ps 15 peep5 - no distress at present  1350 - Dr Ruben Oliveros in - pt continues on cpap with no distress- P/S decreased to 5 per RT as ordered - will draw abg in approx. 30 minutes   80 Dr Shivani Casey called in for update - complete update given - informed of failed cpap trial this am and re- attempting now- pt with no distress currently . Updated on neuro status. Also informed unable to obtain left pedal pulse via doppler but pt has strong posterior tibial pulse on left with doppler . Will update when abgs on cpap done   1505 abgs shown to Dr Miranda Running - order received to extubate . 1515 extubated to 6 l n.c - no distress . Pt has whispery quiet voice - oriented to  name- childrens name .   200 Dr Shivani Casey in to see- spoke with family  -   1915 resting quietly - neuro assessment unchanged - no distress at present

## 2017-10-01 NOTE — PROGRESS NOTES
Resp. rate reduced to 6 bpm by Dr. Floridalma Estrella and if tolerated well will redo SBT in afternoon.

## 2017-10-01 NOTE — PROGRESS NOTES
 Stupor [R40.1]     Acute respiratory failure with hypercapnia (HCC) [J96.02]     [facility]AIRA (acute kidney injury) ([facility] [M54.6]     Metabolic acidosis [U93.4]     Chronic congestive heart failure with left ventricular diastolic dysfunction (HCC) [I50.32] 2017    Encephalopathy [G93.40] 2017    Cerebrovascular accident (CVA) due to stenosis of left carotid artery ([facility] [I63.232]     Card[facility] arrest ([facility] [I46.9]      Past 24 Hours   General: started on CVVH   Cardiovascular: weaned off pressors; no acute events reported in last 24 hours  Neurologic: no acute events reported in last 24 hours  Respiratory: on MV; no acute events reported in last 24 hours    All other systems reviewed  Vitals    height is 5' 3\" (1.6 m) and weight is 128 lb 8.5 oz (58.3 kg). Her core temperature is 99.7 °F (37.6 °C). Her blood pressure is 115/54 (abnormal) and her pulse is 75. Her respiration is 19 and oxygen saturation is 92%.      Temp: Temp: 99.7 °F (37.6 °C)Temp  Av.3 °F (37.9 °C)  Min: 99.7 °F (37.6 °C)  Max: 101.5 °F (19.0 °C)  BP:  Systolic (63NSQ), FZU:125 , Min:74 , SANAZ:574   Diastolic (80IQZ), IEX:10, Min:41, Max:122    HR: Pulse  Av.3  Min: 65  Max: 112  Resp: Resp  Av.2  Min: 12  Max: 56SpO2: 92 %   24HR Pulse Ox Range:  SpO2  Av.7 %  Min: 92 %  Max: 100 %O2 Flow Rate (L/min): 2 L/min  Patient Vitals for the past 8 hrs:   BP Temp Temp src Pulse Resp SpO2   10/01/17 1333 - - - 75 19 92 %   10/01/17 1303 (!) 115/54 - - 78 26 92 %   10/01/17 1203 115/60 99.7 °F (37.6 °C) CORE 72 15 99 %   10/01/17 1103 (!) 141/122 - - 73 12 97 %   10/01/17 1003 (!) 129/94 - - 72 13 100 %   10/01/17 0903 (!) 111/47 - - 76 15 100 %   10/01/17 0827 - - - 68 13 99 %   10/01/17 0803 (!) 121/107 99.9 °F (37.7 °C) CORE 79 21 99 %   10/01/17 0748 - - - - - 98 %   10/01/17 0703 122/76 99.7 °F (37.6 °C) CORE 75 20 97 %       I/O       Intake/Output Summary (Last 24 hours) at 10/01/17 1420  Last data filed at 10/01/17 1256   Gross per 24 hour   Intake          2061.54 ml   Output              307 ml   Net          1754.54 ml     I/O last 3 completed shifts: In: 3044.5 [I.V.:1339.7; Blood:880.9; NG/GT:824]  Out: 1439 [Urine:184]     Date 10/01/17 0000 - 10/01/17 2359   Shift 3296-1290 0700-1440 7186-1058 24 Hour Total   I  N  T  A  K  E   I.V.  (mL/kg) 427.8  (7.3)   427.8  (7.3)    Blood  (mL/kg) 588  (10.1)   588  (10.1)    NG/GT  (mL/kg) 248  (4.3)   248  (4.3)    Shift Total  (mL/kg) 1263.8  (21.7)   1263.8  (21.7)   O  U  T  P  U  T   Urine  (mL/kg/hr) 120  (0.3) 85  205    Shift Total  (mL/kg) 120  (2.1) 85  (1.5)  205  (3.5)   Weight (kg) 58.3 58.3 58.3 58.3     Patient Vitals for the past 96 hrs (Last 3 readings):   Weight   10/01/17 0503 128 lb 8.5 oz (58.3 kg)   09/30/17 0403 129 lb 6.6 oz (58.7 kg)   09/29/17 0603 128 lb 1.4 oz (58.1 kg)       Mechanical Ventilation Data   Vent Information  Ventilator Started: Yes  Ventilation Day(s): 5  Vent Type: C2G5 Graves  Vent Mode: SPONT  Vt Ordered: 400 mL  Vt Exhaled: 610 mL  Rate Set:  (backup rate of 5)  Rate Measured: 11 br/min  Minute Volume: 5.3 Liters  Peak Flow: 50.8 L/min  Pressure Support: 15 cmH20  FiO2 : 21 %  Peak Inspiratory Pressure: 24 cmH2O  I:E Ratio: 1:3.2  Sensitivity: 3  PEEP/CPAP: 5  I Time/ I Time %: 0.71 s  High Pressure Alarm: 40 cmH2O  Low Minute Volume Alarm: 4 L/min  Low Exhaled Vt : 200 mL  High Respiratory Rate: 40 br/min  Mean Airway Pressure: 9.1 cmH20  Plateau Pressure: 17 cmH20  Static Compliance: 0.05 mL/cmH2O  Dynamic Compliance: 0 mL/cmH2O  Auto PEEP: 0.7 cmH20  HFOV In Use?: No  Nitric Oxide/Epoprostenol In Use?: No  Additional Respiratory  Assessments  Pulse: 75  Resp: 19  SpO2: 92 %  End Tidal CO2: 34 (%)  Position: Semi-Nielson's  Humidification Temp: 35  HME (Heat moisture exchanger):  Yes  Circuit Condensation: Drained  Oral Care Completed?: No  Oral Care: Suction toothette, Mouth moisturizer, Lip moisturizer applied  Subglottic Suction Done?: Yes  Cuff Pressure (cm H2O): 28 cm H2O  ABG   Lab Results   Component Value Date    PH 7.54 10/01/2017    PCO2 42 10/01/2017    PO2 96 10/01/2017    HCO3 36 10/01/2017    O2SAT 98 10/01/2017     Lab Results   Component Value Date    IFIO2 30 10/01/2017    MODE AC 10/01/2017    SETTIDVOL 400 10/01/2017    SETPEEP 5.0 10/01/2017     Medications      sodium chloride 10 mL/hr at 09/30/17 0902    DOBUTamine Stopped (09/30/17 1902)    sodium chloride      propofol Stopped (09/27/17 1045)      [START ON 10/2/2017] cefepime  2 g Intravenous Q24H    sodium chloride flush  10 mL Intravenous Q12H    epoetin  1,000 Units Subcutaneous Once per day on Tue Thu Sat    calcium replacement protocol   Other RX Placeholder    magnesium replacement protocol   Other RX Placeholder    phosphorus replacement protocol   Other RX Placeholder    chlorhexidine  15 mL Mouth/Throat 4x Daily    famotidine (PEPCID) injection  10 mg Intravenous BID    aspirin  81 mg Oral Once    clopidogrel  75 mg Oral Once    sodium chloride  250 mL Intravenous Once     IV  Diet/Nutrition   DIET TUBE FEED CONTINUOUS/CYCLIC NPO; Renal Formula; Orogastric; Continuous; 25; 35    Exam   VITALS    height is 5' 3\" (1.6 m) and weight is 128 lb 8.5 oz (58.3 kg). Her core temperature is 99.7 °F (37.6 °C). Her blood pressure is 115/54 (abnormal) and her pulse is 75. Her respiration is 19 and oxygen saturation is 92%. General Appearance  Sedated on MV  HEENT - Life support devices in place (ET, OG), PERRL  Lungs - Chest expands equally, no wheezes, rales or rhonchi. Cardiovascular - eart sounds are normal. Normal rate and rhythm regular,  no murmur, gallop or rub. Abdomen - Soft, nontender, nondistended, no masses or organomegaly  Neurologic - GCS [id number]=[id number]. Moves L arm, leg, R leg. No movement of R arm.   Extremities - peripheral cyanosis that resolves when CI >2.5    Labs   I reviewed labs in EPIC results review    BMP  Recent Labs 09/30/17   1015  09/30/17   1500  10/01/17   0135  10/01/17   0430   NA  142  145   --   143   K  4.3  4.4  4.2  4.3   CL  99  98   --   101   CO2  34*  35*   --   34*   BUN  15  19   --   28*   CREATININE  0.8  1.0   --   1.4*   GLUCOSE  121*  115*   --   115*   MG  1.6  1.6  1.6  1.7   PHOS  2.1*  2.5   --   3.1   CALCIUM  9.4  8.4*   --   7.8*        CBC  Recent Labs      09/30/17   1015  09/30/17   1500  10/01/17   0430   WBC  13.4*  12.5*  15.2*   RBC  2.61*  2.44*  3.02*   HGB  7.9*  7.3*  9.1*   HCT  22.5*  21.1*  26.9*   MCV  86.2  86.3  89.0   MCH  30.3  29.9  30.1   MCHC  35.1  34.7  33.9   RDW  14.9*  15.1*  15.3*   PLT  30*  36*  85*   MPV  9.1  9.8  9.2        ABG  Lab Results   Component Value Date    PH 7.54 10/01/2017    PO2 96 10/01/2017    PCO2 42 10/01/2017    HCO3 36 10/01/2017    O2SAT 98 10/01/2017       This patient is critically ill and at significant increased risk of morbidity or mortality. I spent 90   minutes providing non-procedural critical care time independent of other providers.

## 2017-10-01 NOTE — PROGRESS NOTES
[facility]         Renal Inpatient Follow-Up note         10/1/2017 11:44 AM    Pt Name:   Minoo Steinberg  YOB: 1935  Attending:   Becky Felder MD    Chief Complaint : Minoo Steinberg is a 80 y.o. female being followed by nephrology for ZAIRA    Interval History :   Patient seen and examined by me. No distress  Intubated and cannot accurately assess any history or ROS  Off dobutrex, cardiac indices are looking better  Family at bedside. On 30% FiO2     Scheduled Medications :    [START ON 10/2/2017] cefepime  2 g Intravenous Q24H    sodium chloride flush  10 mL Intravenous Q12H    epoetin  1,000 Units Subcutaneous Once per day on Tue Thu Sat    calcium replacement protocol   Other RX Placeholder    magnesium replacement protocol   Other RX Placeholder    phosphorus replacement protocol   Other RX Placeholder    chlorhexidine  15 mL Mouth/Throat 4x Daily    famotidine (PEPCID) injection  10 mg Intravenous BID    aspirin  81 mg Oral Once    clopidogrel  75 mg Oral Once    sodium chloride  250 mL Intravenous Once      sodium chloride 10 mL/hr at 09/30/17 0902    DOBUTamine Stopped (09/30/17 1902)    sodium chloride      propofol Stopped (09/27/17 1045)       Vitals :  BP (!) 141/122  Pulse 73  Temp 99.9 °F (37.7 °C) (Core)   Resp 12  Ht 5' 3\" (1.6 m)  Wt 128 lb 8.5 oz (58.3 kg)  SpO2 97%  BMI 22.77 kg/m2    24HR INTAKE/OUTPUT:      Intake/Output Summary (Last 24 hours) at 10/01/17 1144  Last data filed at 10/01/17 1045   Gross per 24 hour   Intake          2061.54 ml   Output              262 ml   Net          1799.54 ml     Last 3 weights  Wt Readings from Last 3 Encounters:   10/01/17 128 lb 8.5 oz (58.3 kg)   09/25/17 107 lb (48.5 kg)   09/18/17 115 lb (52.2 kg)           Physical Exam :  General Appearance:  Well developed.  No distress  Mouth/Throat:  Oral mucosa moist  Neck:  Supple, no JVD  Lungs:  Breath sounds: clear  Heart[de-identified]  S1,S2 heard  Abdomen:  Soft, non - tender  Musculoskeletal:  Edema - mild         Last 3 CBC   Recent Labs      09/30/17   1015  09/30/17   1500  10/01/17   0430   WBC  13.4*  12.5*  15.2*   RBC  2.61*  2.44*  3.02*   HGB  7.9*  7.3*  9.1*   HCT  22.5*  21.1*  26.9*   PLT  30*  36*  85*     Last 3 CMP  Recent Labs      09/30/17   1015  09/30/17   1500  10/01/17   0135  10/01/17   0430   NA  142  145   --   143   K  4.3  4.4  4.2  4.3   CL  99  98   --   101   CO2  34*  35*   --   34*   BUN  15  19   --   28*   CREATININE  0.8  1.0   --   1.4*   CALCIUM  9.4  8.4*   --   7.8*   LABALBU  2.0*   --    --    --    BILITOT  0.4   --    --    --              Assessment / Plan   Renal - ZAIRA - has been on CVVH for more than 48 hours  · Serum creatinine is rising. Started some urine output  ~ 25 ml/hr  · Will give 2 mg IV bumex - hopefully make more UO and aid in extubation  · D/W family     Electrolytes - WNL  Metabolic alkalosis - may be due citrate, Ph is stable  Vent dependent respiratory failure  Hypotension - resolved  Anemia - S/P transfusion  S/P carotid stenting    SHRUTHI Murguia D.  [facility].

## 2017-10-01 NOTE — PROGRESS NOTES
VENTILATOR LIBERATION PROTOCOL    PRE-TRIAL PATIENT ASSESSMENT - COMPLETED AT 0829    Ventilatory Assessment:    PARAMETER CRITERIA FOR WEANING   Reversal  of the acute disease process that prompted intubation: No At least partial or complete reversal   FiO2 : 30 % FIO2 less than or equal to 50%     PEEP less than or equal to 5 cm H2O   Hemodynamic stability: Yes Dopamine or Dobutamine at 5 mcg/kg/minute or less   Adequate correction of electrolytes, Hgb, and HCT: Yes Within lab range   Neurologic stability: No Ability to cough, voluntarily initiate ventilator effort, cooperate with pulmonary toilet measures     ABG:   Lab Results   Component Value Date    PH 7.54 10/01/2017    PO2 96 10/01/2017    PCO2 42 10/01/2017    HCO3 36 10/01/2017    O2SAT 98 10/01/2017     HGB/WBC:  Lab Results   Component Value Date    HGB 9.1 (L) 10/01/2017    WBC 15.2 (H) 10/01/2017         Vital Signs:    PARAMETER CRITERIA FOR WEANING Meets Criteria   Pulse: 73 Within patient's normal limits / stable Yes   Resp: 12 Less than or equal to 30 Yes   BP: (!) 141/122 Within patient's normal limits / minimal pressors (Hemodynamically Stable) No   SpO2: 97 % Greater than or equal to 90% Yes   Temp: 99.9 °F (37.7 °C) Less than 38. 5oC / 101. 3oF Yes    Sedation/paralytic weaned Yes     [x]    Based on this assessment and the Ventilator Liberation Protocol, this patient IS being placed on a Spontaneous Breathing Trial (SBT) at this time.  []    Based on this assessment and the Ventilator Liberation Protocol, this patient IS NOT being placed on a Spontaneous Breathing Trial (SBT) at this time.     SBT - Initiated at  [address] [place] [address] [address]    Ventilator Settings:  CPAP 5 cmH2O, Pressure Support 15 cmH2O  Tube Resistance Compensation (TRC) on    1 Minute SBT Respiratory Parameters:   VE: 3.2 L   RR: 6 b/m   VT: 620 mL (average VT = VE/RR)   SPO2: 95 %    []   RR is < 35 but > than 9 per minute, RSBI is < 105, SpO2 > 92%, patient's vital signs are stable, and patient

## 2017-10-01 NOTE — PROGRESS NOTES
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 Will start on nebs. Will start weaning patient per weaning protocols per RN/RT. Wean FIO2 to keep saturation 90-92%. Cardiovascular   Shock, still on dobutamine. CI 2.5-3, but it falls w/ low BP and low SVI whenever we wean, associated w/ worsening peripheral cyanosis and worsening mental status. Gastrointestinal   NG tube. On tube feeds. Dietary following for nutrition  Peptic ulcer disease prophylaxis with PPI. Renal   Anuric ZAIRA; on CVVH. ID   7d atbx for presumed REGINA. Heme   Anemia, resolved w/ transfusion  DVT prophylaxis    Endo   Glycemic control    Disposition   Bed rest.  Condition critical   Vitals signs monitoring per protocol. FC  I met w/ Dr Pranav Villa and with family to discuss plan-of-care. Plan to reassess ability to wean dobutamine as best as her body will allow. SUPPORTING DATA   I saw and examined patient  I discussed case with ICU team (RN, RT) caring for patient  I reviewed the chart, VSS, and labs.     Patient - Emma Talbert,  Age - 80 y.o.    - 1935      Room Number - 4D-11/011-A   [facility] -  [id number]   Acct # - [de-identified]  Date of Admission -  2017  5:30 AM  Hospital Day - [address] Problems    Diagnosis Date Noted    Metabolic alkalosis [D02.8]     Hypotension [I95.9]     Stupor [R40.1]     Acute respiratory failure with hypercapnia (HCC) [J96.02]     [facility] kidney injury) ([facility] [X01.6]     Metabolic acidosis [C79.1]     Chronic congestive heart failure with left ventricular diastolic dysfunction (HCC) [I50.32] 2017    Encephalopathy [G93.40] 2017    Cerebrovascular accident (CVA) due to stenosis of left carotid artery ([facility] [I63.232]     Cardiac arrest ([facility] [I46.9]      Past 24 Hours   General: started on CVVH   Cardiovascular: weaned off pressors; no acute events reported in last 24 hours  Neurologic: no acute events reported in last 24 hours  Respiratory: on MV; no acute events reported in last 24 hours    All other systems reviewed  Vitals    height is 5' 3\" (1.6 m) and weight is 129 lb 6.6 oz (58.7 kg). Her core temperature is 100.6 °F (38.1 °C). Her blood pressure is 96/53 (abnormal) and her pulse is 83. Her respiration is 22 and oxygen saturation is 100%.      Temp: Temp: 100.6 °F (38.1 °C)Temp  Av.1 °F (36.7 °C)  Min: 97.2 °F (36.2 °C)  Max: 100.6 °F (31.9 °C)  BP:  Systolic (41IXR), GNE:069 , Min:74 , QJT:972   Diastolic (74LSL), TRAY:35, Min:41, Max:65    HR: Pulse  Av.4  Min: 67  Max: 112  Resp: Resp  Av.1  Min: 13  Max: 56SpO2: 100 %   24HR Pulse Ox Range:  SpO2  Av.5 %  Min: 93 %  Max: 100 %O2 Flow Rate (L/min): 2 L/min  Patient Vitals for the past 8 hrs:   BP Temp Temp src Pulse Resp SpO2   17 - - - 83 22 100 %   17 1930 (!) 96/53 - - 91 23 94 %   17 1900 (!) 121/58 100.6 °F (38.1 °C) CORE 112 (!) 56 99 %   17 1830 (!) 152/60 - - 95 15 95 %   17 1800 (!) 74/41 - - 67 16 100 %   17 1745 - - - 72 16 100 %   17 1700 (!) 88/54 - - 73 14 100 %   17 1600 (!) 102/51 - - 81 19 99 %   17 1511 - - - 88 17 99 %   17 1502 (!) 99/52 100.6 °F (38.1 °C) CORE 87 18 99 %   17 1400 (!) 99/54 - - 97 24 100 %   17 1305 (!) 112/50 - - 103 28 100 %       I/O       Intake/Output Summary (Last 24 hours) at 17  Last data filed at 17 1530   Gross per 24 hour suctioned, Lip moisturizer applied, Mouth moisturizer  Subglottic Suction Done?: Yes  Cuff Pressure (cm H2O): 25 cm H2O  ABG   Lab Results   Component Value Date    PH 7.52 09/30/2017    PCO2 43 09/30/2017    PO2 101 09/30/2017    HCO3 35 09/30/2017    O2SAT 98 09/30/2017     Lab Results   Component Value Date    IFIO2 30 09/30/2017    MODE AC 09/30/2017    SETTIDVOL 400 09/30/2017    SETPEEP 5.0 09/30/2017     Medications      sodium chloride 10 mL/hr at 09/30/17 0902    DOBUTamine Stopped (09/30/17 1902)    citrate dextrose Stopped (09/30/17 1052)    sodium chloride      calcium chloride CRRT infusion Stopped (09/30/17 1052)    prismaSATE BGK 2/0 500 mL/hr (09/29/17 2340)    propofol Stopped (09/27/17 1045)    norepinephrine Stopped (09/30/17 1840)    dexmedetomidine (PRECEDEX) IV infusion Stopped (09/30/17 1845)      sodium chloride  250 mL Intravenous Once    sodium chloride  250 mL Intravenous Once    sodium chloride flush  10 mL Intravenous Q12H    cefepime  2 g Intravenous Q12H    epoetin  1,000 Units Subcutaneous Once per day on Tue Thu Sat    calcium replacement protocol   Other RX Placeholder    magnesium replacement protocol   Other RX Placeholder    phosphorus replacement protocol   Other RX Placeholder    chlorhexidine  15 mL Mouth/Throat 4x Daily    famotidine (PEPCID) injection  10 mg Intravenous BID    aspirin  81 mg Oral Once    clopidogrel  75 mg Oral Once    sodium chloride  250 mL Intravenous Once     IV  Diet/Nutrition   DIET TUBE FEED CONTINUOUS/CYCLIC NPO; Renal Formula; Orogastric; Continuous; 25; 35    Exam   VITALS    height is 5' 3\" (1.6 m) and weight is 129 lb 6.6 oz (58.7 kg). Her core temperature is 100.6 °F (38.1 °C). Her blood pressure is 96/53 (abnormal) and her pulse is 83. Her respiration is 22 and oxygen saturation is 100%.    General Appearance  Sedated on MV  HEENT - Life support devices in place (ET, OG), PERRL  Lungs - Chest expands equally, no wheezes, rales or rhonchi. Cardiovascular - eart sounds are normal. Normal rate and rhythm regular,  no murmur, gallop or rub. Abdomen - Soft, nontender, nondistended, no masses or organomegaly  Neurologic - GCS [id number]=5E. Moves L arm, leg, R leg. No movement of R arm. Extremities - peripheral cyanosis that resolves when CI >2.5    Labs   I reviewed labs in [facility] results review    BMP  Recent Labs      09/30/17 0415 09/30/17   1015  09/30/17   1500   NA  141  142  145   K  3.6  4.3  4.4   CL  97*  99  98   CO2  33  34*  35*   BUN  16  15  19   CREATININE  0.9  0.8  1.0   GLUCOSE  144*  121*  115*   MG  1.6  1.6  1.6   PHOS  2.1*  2.1*  2.5   CALCIUM  8.8  9.4  8.4*        CBC  Recent Labs      09/30/17 0415 09/30/17   1015  09/30/17   1500   WBC  12.8*  13.4*  12.5*   RBC  2.73*  2.61*  2.44*   HGB  8.1*  7.9*  7.3*   HCT  23.8*  22.5*  21.1*   MCV  87.1  86.2  86.3   MCH  29.8  30.3  29.9   MCHC  34.2  35.1  34.7   RDW  14.5  14.9*  15.1*   PLT  40*  30*  36*   MPV  9.0  9.1  9.8        ABG  Lab Results   Component Value Date    PH 7.52 09/30/2017    PO2 101 09/30/2017    PCO2 43 09/30/2017    HCO3 35 09/30/2017    O2SAT 98 09/30/2017       This patient is critically ill and at significant increased risk of morbidity or mortality. I spent 90   minutes providing non-procedural critical care time independent of other providers.

## 2017-10-01 NOTE — CONSULTS
Consults                                      CONSULTATION NOTE :ID       Patient - Sanjana Baumann,  Age - 80 y.o.    - 1935      Room Number - 4D-11/011-A   [facility] -  [id number]   [facility]  Date of Admission -  2017  5:30 AM  Patient's PCP: Amaya Johnson MD     Requesting Physician: Willie Leyva MD    REASON FOR CONSULTATION   leukocytosis    HISTORY OF PRESENT ILLNESS       This is a very pleasant 80 y.o. female who was admitted to the icu after she had a hypotensive episode following carotid stent. She had some bleeding from the groin area. she was intubated, transfused. Had ZAIRA requiring brief dialysis. She had to re-intubated. I was asked to see her due to leukocytosis. She has been having high wbc since admission. Has low grade fever, so far her endotracheal aspirate shows normal ping. Has been on cefepime. has bruising on the upper arm. her medical record was reviewed    PAST MEDICAL AND SURGICAL HISTORY       Past Medical History:   Diagnosis Date    Alcohol abuse     Carotid artery occlusion 2017    History of falling     Hyperlipidemia     Hypertension     Low back pain     Movement disorder     Muscle weakness (generalized)     Osteopenia     Scoliosis     lower lumbar Intervertebral Disc Displacement    Vitamin B-complex deficiency        Past Surgical History:   Procedure Laterality Date    HYSTERECTOMY      SHOULDER SURGERY Bilateral          MEDICATIONS PRIOR TO ADMISSION:       Scheduled Meds:   [START ON 10/2/2017] cefepime  2 g Intravenous Q24H    sodium chloride flush  10 mL Intravenous Q12H    epoetin  1,000 Units Subcutaneous Once per day on  Sat    calcium replacement protocol   Other RX Placeholder    magnesium replacement protocol   Other RX Placeholder    phosphorus replacement protocol   Other RX Placeholder    chlorhexidine  15 mL Mouth/Throat 4x Daily    famotidine (PEPCID) injection  10 mg Intravenous BID    09/30/17   1015  09/30/17   1500  10/01/17   0430   WBC  13.4*  12.5*  15.2*   HGB  7.9*  7.3*  9.1*   PLT  30*  36*  85*     BMP:    Recent Labs      09/30/17   1015  09/30/17   1500  10/01/17   0135  10/01/17   0430   NA  142  145   --   143   K  4.3  4.4  4.2  4.3   CL  99  98   --   101   CO2  34*  35*   --   34*   BUN  15  19   --   28*   CREATININE  0.8  1.0   --   1.4*   GLUCOSE  121*  115*   --   115*     Calcium:  Recent Labs      10/01/17   0430   CALCIUM  7.8*     Ionized Calcium:No results for input(s): IONCA in the last 72 hours. Magnesium:  Recent Labs      10/01/17   0430   MG  1.7     Phosphorus:  Recent Labs      10/01/17   0430   PHOS  3.1     BNP:No results for input(s): BNP in the last 72 hours. Glucose:No results for input(s): POCGLU in the last 72 hours. HgbA1C: No results for input(s): LABA1C in the last 72 hours. INR:   Recent Labs      09/30/17   1015   INR  0.97     Hepatic:   Recent Labs      09/30/17   1015   ALKPHOS  104   ALT  42   AST  80*   PROT  3.8*   BILITOT  0.4   BILIDIR  <0.2   LABALBU  2.0*     Amylase and Lipase:  Recent Labs      09/30/17   1015   LACTA  2.1     Lactic Acid:   Recent Labs      09/30/17   1015   LACTA  2.1     Troponin: No results for input(s): CKTOTAL, CKMB, TROPONINI in the last 72 hours. BNP: No results for input(s): BNP in the last 72 hours. Lipids: No results for input(s): CHOL, TRIG, HDL, LDLCALC in the last 72 hours. Invalid input(s): LDL  ABGs: No results found for: PHART, PO2ART, DNN5ZRR, LIZ5GGC, BEART    Cultures:      CXR:       UA: No results for input(s): SPECGRAV, PHUR, COLORU, CLARITYU, MUCUS, PROTEINU, BLOODU, RBCUA, [address], BACTERIA, NITRU, GLUCOSEU, BILIRUBINUR, UROBILINOGEN, KETUA, LABCAST, LABCASTTY, AMORPHOS in the last 72 hours.     Invalid input(s): CRYSTALS      IMAGING:    Micro:   Lab Results   Component Value Date    BC No growth-preliminary  No growth   11/09/2014       Problem list of patient      Patient Active Problem

## 2017-10-01 NOTE — PLAN OF CARE
Problem: RESPIRATORY  Goal: Will be able to breathe spontaneously, without ventilator support  Will be able to breathe spontaneously, without ventilator support   Outcome: Ongoing  VENTILATOR LIBERATION PROTOCOL     PRE-TRIAL PATIENT ASSESSMENT - COMPLETED AT 1330         Ventilatory Assessment:      PARAMETER CRITERIA FOR WEANING   Reversal  of the acute disease process that prompted intubation: Yes At least partial or complete reversal   FiO2 : 21 % FIO2 less than or equal to 50%     PEEP less than or equal to 5 cm H2O   Hemodynamic stability: Yes Dopamine or Dobutamine at 5 mcg/kg/minute or less   Adequate correction of electrolytes, Hgb, and HCT: Yes Within lab range   Neurologic stability: Yes Ability to cough, voluntarily initiate ventilator effort, cooperate with pulmonary toilet measures      ABG:   Lab Results   Component Value Date     PH 7.55 10/01/2017     PO2 53 10/01/2017     PCO2 41 10/01/2017     HCO3 36 10/01/2017     O2SAT 91 10/01/2017     HGB/WBC:  Lab Results   Component Value Date     HGB 9.1 (L) 10/01/2017     WBC 15.2 (H) 10/01/2017                    Vital Signs:      PARAMETER CRITERIA FOR WEANING Meets Criteria   Pulse: 71 Within patient's normal limits / stable Yes   Resp: 12 Less than or equal to 30 Yes   BP: (!) 104/43 Within patient's normal limits / minimal pressors (Hemodynamically Stable) No   SpO2: 93 % Greater than or equal to 90% Yes   Temp: 99.7 °F (37.6 °C) Less than 38. 5oC / 101. 3oF Yes     Sedation/paralytic weaned Yes      [X]        Based on this assessment and the Ventilator Liberation Protocol, this patient IS being placed on a Spontaneous Breathing Trial (SBT) at this time. [ ]     Based on this assessment and the Ventilator Liberation Protocol, this patient IS NOT being placed on a Spontaneous Breathing Trial (SBT) at this time.      SBT - Initiated at  1330     Ventilator Settings:  CPAP 5 cmH2O, Pressure Support 5 cmH2O  Tube Resistance Compensation (TRC) on     1 Minute SBT Respiratory Parameters:        VE:        7.1 L        RR:       11 b/m        VT:         360 mL (average VT = VE/RR)        RSBI:    25 (RR/VT in liters)        ETCO2:  38 cmH2O        SPO2:    95 %     [X]   RR is < 35 but > than 9 per minute, RSBI is < 105, SpO2 > 92%, patient's vital signs are stable, and patient is in no apparent distress; therefore, patient is being left on SBT for up to 1 hour. [ ]   RR is > to 35 for > 2 minutes or < 10 per minute, RSBI is >105, vital signs are unstable, or patient is in distress; therefore, patient is being placed back on previous settings. SBT  Concluded at  [phone]. Weaning Parameters/VS's at conclusion of SBT:        VE:        5.6 L        RR:       18 b/m        VT:         321 mL (average VT = VE/RR)        RSBI:    51 (RR/VT in liters)        ETCO2:  38 cmH2O        SPO2:    92 %     [X]   Patient tolerated SBT for full  minutes with acceptable weaning parameters and vital signs and showed no signs of distress. [ ]   Patient tolerated SBT for full  minutes, but had unacceptable weaning parameters or vital signs, and/or signs of distress. [ ]   Patient was unable to tolerate SBT for  minutes and was placed back on previous settings. COMMENTS:  Pt tolerated SBT well and Dr. Keo Kowalski would like to extubate. Blood gases are in acceptable range. End tidal Co2 will be in place.

## 2017-10-01 NOTE — PROGRESS NOTES
Neurology Inpatient Progress Note:  Date of Service: 09/30/17  Chief Complaint: No chief complaint on file. ASSESSMENT AND PLAN:  1. Acute respiratory failure 2/2 acute ?cardiogenic shock of unclear aetiology. This does not appear straightforward. In this setting, I would like Virl Felt placed for accurate assessment of cardiac indices. Echo w/ low normal EF. -Appreciate ICU assistance.   -Hold off on dobutamine until Idalmis Koyanagi is placed. 2. Worsening anemia and thrombocytopenia-transfuse 2 u PRBC and 1 u platelets  3. ZAIRA 2/2 ATN 2/2 hypotension and contrast.   -CVVH discontinued today.    -continue to monitor UOP and labs. 4. Fever r/o sepsis. Tylenol for fever. Cefepime.   -Seek Dr. Kostas Gabriel input re: further antibiotic coverage. 5. L ICA stent. Pt just had CT head this evening--no acute process. No evidence of stroke or hemorrhage.   -continue aspirin/plavix. I would expect a complete neuro recovery based on the imaging. -DVT prophylaxis  At least 32 minutes have been spent on the care of this patient. Greater than 50% of the floor time has been spent providing counseling/coordination of care with patient, family, and/or other providers/agencies involved with the patient's care. Franci Camacho M.D., J.D. Vascular Neurology and Endovascular Surgical Neuroradiology  Cell: [phone]      S: No acute events overnight. Neurologic status is slightly worse as pt is less alert and not briskly following commands. ROS was reviewed today and unchanged since last review. Namely, there are no new rashes, no new symptoms concerning for anaphylaxis, no new black/tarry stools, no new symptoms of temperature intolerance.     Medications:  Current Facility-Administered Medications: 0.9 % sodium chloride infusion, , Intravenous, Continuous  DOBUTamine (DOBUTREX) 500 mg in dextrose 5 % 250 mL infusion, 2.5 mcg/kg/min (Order-Specific), Intravenous, Continuous  0.9 % sodium chloride bolus, 250 mL, Intravenous, Once  0.9 % sodium chloride bolus, 250 mL, Intravenous, Once  sodium chloride flush 0.9 % injection 10 mL, 10 mL, Intravenous, Q12H  DOPamine (INTROPIN) 400 mg in dextrose 5 % 250 mL infusion, 5 mcg/kg/min, Intravenous, Continuous  citrate dextrose (ACD Formula A) 0.73-2.45-2.2 GM/100ML solution, , Intracatheter, Continuous  cefepime (MAXIPIME) 2 g IVPB minibag, 2 g, Intravenous, Q12H  0.9 % sodium chloride infusion, , Intravenous, Continuous  [DISCONTINUED] citrate dextrose (ACD Formula A) 0.73-2.45-2.2 GM/100ML solution, , Intracatheter, Continuous **AND** calcium chloride 8 g in sodium chloride 0.9 % 1,000 mL infusion, 8 g, Intravenous, Continuous  prismaSATE BGK 2/0 dialysis solution, , Dialysis, Continuous  potassium chloride (KLOR-CON M) extended release tablet 40 mEq, 40 mEq, Oral, PRN **OR** potassium chloride 20 MEQ/15ML (10%) oral solution 40 mEq, 40 mEq, Oral, PRN **OR** potassium chloride 10 mEq/100 mL IVPB (Peripheral Line), 10 mEq, Intravenous, PRN  potassium chloride 20 mEq/50 mL IVPB (Central Line), 20 mEq, Intravenous, PRN  epoetin natalia (EPOGEN;PROCRIT) injection 1,000 Units, 1,000 Units, Subcutaneous, Once per day on Tue Thu Sat  calcium chloride 0.5 g in sodium chloride 0.9 % 100 mL IVPB, 0.5 g, Intravenous, PRN  propofol 1000 MG/100ML injection, 10 mcg/kg/min, Intravenous, Titrated  calcium replacement protocol, , Other, RX Placeholder  magnesium replacement protocol, , Other, RX Placeholder  phosphorus replacement protocol, , Other, RX Placeholder  norepinephrine (LEVOPHED) 16 mg in dextrose 5% 250 mL infusion, 5 mcg/min, Intravenous, Continuous  chlorhexidine (PERIDEX) 0.12 % solution 15 mL, 15 mL, Mouth/Throat, 4x Daily  famotidine (PEPCID) injection 10 mg, 10 mg, Intravenous, BID  [COMPLETED] dexmedetomidine (PRECEDEX) 24 mcg in sodium chloride 0.9 % 50 mL IV bolus, 0.5 mcg/kg, Intravenous, Once **FOLLOWED BY** dexmedetomidine (PRECEDEX) 400 mcg in sodium chloride 0.9 % 100 mL for:  LABA1C    No results found for: CHARCSF, CULTURE  No results found for: PHENYTOIN, CARBTOT, PHENOBARB, VALPROATE  No results found for: [facility]    Lab Results   Component Value Date    NITRU NEGATIVE 09/26/2017    COLORU YELLOW 09/26/2017    PHUR 6.0 09/26/2017    LABCAST NONE SEEN 09/26/2017    LABCAST NONE SEEN 09/26/2017    WBCUA 2-4 09/26/2017    RBCUA 0-2 09/26/2017    YEAST NONE SEEN 09/26/2017    BACTERIA NONE 09/26/2017    SPECGRAV >1.030 09/26/2017    LEUKOCYTESUR NEGATIVE 09/26/2017    LEUKOCYTESUR TRACE 11/09/2014    UROBILINOGEN 0.2 09/26/2017    BILIRUBINUR NEGATIVE 09/26/2017    BLOODU NEGATIVE 09/26/2017    KETUA NEGATIVE 09/26/2017

## 2017-10-01 NOTE — PROGRESS NOTES
VENTILATOR LIBERATION PROTOCOL    PRE-TRIAL PATIENT ASSESSMENT - COMPLETED     Ventilatory Assessment:    PARAMETER CRITERIA FOR WEANING   Reversal  of the acute disease process that prompted intubation: No At least partial or complete reversal   FiO2 : 50 % FIO2 less than or equal to 50%     PEEP less than or equal to 5 cm H2O   Hemodynamic stability: Yes Dopamine or Dobutamine at 5 mcg/kg/minute or less   Adequate correction of electrolytes, Hgb, and HCT: No Within lab range   Neurologic stability: No Ability to cough, voluntarily initiate ventilator effort, cooperate with pulmonary toilet measures     ABG:   Lab Results   Component Value Date    PH 7.52 09/30/2017    PO2 101 09/30/2017    PCO2 43 09/30/2017    HCO3 35 09/30/2017    O2SAT 98 09/30/2017     HGB/WBC:  Lab Results   Component Value Date    HGB 7.3 (L) 09/30/2017    WBC 12.5 (H) 09/30/2017         Vital Signs:    PARAMETER CRITERIA FOR WEANING Meets Criteria   Pulse: 74 Within patient's normal limits / stable Yes   Resp: 18 Less than or equal to 30 Yes   BP: (!) 111/56 Within patient's normal limits / minimal pressors (Hemodynamically Stable) Yes   SpO2: 98 % Greater than or equal to 90% Yes   Temp: 99.9 °F (37.7 °C) Less than 38. 5oC / 101. 3oF Yes    Sedation/paralytic weaned No     []    Based on this assessment and the Ventilator Liberation Protocol, this patient IS being placed on a Spontaneous Breathing Trial (SBT) at this time. [x]    Based on this assessment and the Ventilator Liberation Protocol, this patient IS NOT being placed on a Spontaneous Breathing Trial (SBT) at this time. COMMENTS:  Patient not being weaned at this time; patient was given blood tonight; HBG is low and patient is not following commands. Will continue to monitor patients progress.

## 2017-10-01 NOTE — PROGRESS NOTES
Recent Labs      09/30/17   1500  10/01/17   0430   BUN  19  28*       Recent Labs      09/30/17   1500  10/01/17   0430   CREATININE  1.0  1.4*       Estimated Creatinine Clearance: 26 mL/min (based on Cr of 1.4).       Plan: CVVH stopped, Change cefepime to 2 gram q24h

## 2017-10-01 NOTE — PROGRESS NOTES
Patient has been successfully weaned from Mechanical Ventilation. RSBI before extubation was 53 with EtCO2 of 38 and SpO2 of 92 on 21% FiO2. Patient extubated and placed on 6 liters/min via nasal cannula. Post extubation SpO2 is 99% with HR  74 bpm and RR 18 breaths/min. Patient had moderate cough that was non-productive. Extubation Well tolerated by patient. Lexi Alexander

## 2017-10-01 NOTE — PLAN OF CARE
Problem: RESPIRATORY  Goal: Will be able to breathe spontaneously, without ventilator support  Will be able to breathe spontaneously, without ventilator support   Outcome: Completed Date Met:  10/01/17  Pt extubated  Tolerated well and put on 6 lpm via nc.

## 2017-10-02 NOTE — PLAN OF CARE
Problem: Falls - Risk of  Goal: Absence of falls  Outcome: Met This Shift  Fall precautions continue for safety. Problem: Discharge Planning:  Goal: Discharged to appropriate level of care  Discharged to appropriate level of care   Outcome: Not Met This Shift  Patient extubated earlier today; not appropriate for discharge at this time. Problem: Tissue Perfusion - Peripheral, Altered:  Goal: Circulatory function of lower extremities is within specified parameters  Circulatory function of lower extremities is within specified parameters   Outcome: Ongoing  Continue to monitor. Problem: Pain:  Goal: Pain level will decrease  Pain level will decrease   Outcome: Ongoing  Patient denies pain at present time; continue to monitor  Goal: Control of acute pain  Control of acute pain   Outcome: Ongoing  Prn pain meds available if needed  Goal: Control of chronic pain  Control of chronic pain   Outcome: Ongoing  Prn pain meds available if needed. Problem: Bleeding:  Goal: Will show no signs and symptoms of excessive bleeding  Will show no signs and symptoms of excessive bleeding   Outcome: Ongoing  Continue to monitor. Problem: Restraint Use - Nonviolent/Non-Self-Destructive Behavior:  Goal: Absence of restraint indications  Absence of restraint indications   Outcome: Completed Date Met:  10/01/17  Restraints released on previous shift. Goal: Absence of restraint-related injury  Absence of restraint-related injury   Outcome: Completed Date Met:  10/01/17  Restraints released on previous shift. Problem: Nutrition  Goal: Optimal nutrition therapy  Outcome: Not Met This Shift  Pt npo at present time    Problem: Airway Clearance - Ineffective:  Goal: Ability to maintain a clear airway will improve  Ability to maintain a clear airway will improve   Outcome: Ongoing  Continue to monitor.     Problem: Anxiety/Stress:  Goal: Level of anxiety will decrease  Level of anxiety will decrease   Outcome: Ongoing  Continue to

## 2017-10-02 NOTE — PROGRESS NOTES
Dr. Peggy Mendes at bedside, informed another RN would remove patients CVC catheter, Dr. Peggy Mendes stated he would remove line, CVC removed from L groin- tip intact, patient tolerated well, pressure held by Dr. Peggy Mendes, dsg applied. Will continue to closely monitor.

## 2017-10-02 NOTE — PROGRESS NOTES
mL/hr at 09/30/17 0902    DOBUTamine Stopped (09/30/17 1902)    sodium chloride      propofol Stopped (09/27/17 1045)       CBC:   Recent Labs      09/30/17   1500  10/01/17   0430  10/02/17   0516   WBC  12.5*  15.2*  15.0*   HGB  7.3*  9.1*  10.0*   PLT  36*  85*  90*     CMP:  Recent Labs      09/30/17   1500  10/01/17   0135  10/01/17   0430  10/02/17   0516   NA  145   --   143  141   K  4.4  4.2  4.3  3.8   CL  98   --   101  99   CO2  35*   --   34*  32   BUN  19   --   28*  35*   CREATININE  1.0   --   1.4*  1.4*   GLUCOSE  115*   --   115*  101   CALCIUM  8.4*   --   7.8*  7.7*   LABGLOM  53*   --   36*  36*     Troponin: No results for input(s): TROPONINI in the last 72 hours. BNP: No results for input(s): BNP in the last 72 hours. INR:   Recent Labs      09/29/17   2200  09/30/17   0415  09/30/17   1015   INR  1.01  0.95  0.97     Lipids: No results for input(s): CHOL, LDLDIRECT, TRIG, HDL, AMYLASE, LIPASE in the last 72 hours. Liver: Recent Labs      09/30/17   1015   AST  80*   ALT  42   ALKPHOS  104   PROT  3.8*   LABALBU  2.0*   BILITOT  0.4     Iron:  No results for input(s): IRONS, FERRITIN in the last 72 hours. Invalid input(s): LABIRONS    Objective:   Vitals: BP (!) 148/50  Pulse 71  Temp 98.4 °F (36.9 °C) (Core)   Resp 16  Ht 5' 3\" (1.6 m)  Wt 128 lb 8.5 oz (58.3 kg)  SpO2 98%  BMI 22.77 kg/m2   Wt Readings from Last 3 Encounters:   10/01/17 128 lb 8.5 oz (58.3 kg)   09/25/17 107 lb (48.5 kg)   09/18/17 115 lb (52.2 kg)      24HR INTAKE/OUTPUT:    Intake/Output Summary (Last 24 hours) at 10/02/17 0624  Last data filed at 10/02/17 0537   Gross per 24 hour   Intake              435 ml   Output             1730 ml   Net            -1295 ml       Constitutional:  Alert, awake, no apparent distress   Skin:normal   HEENT:Pupils are reactive . Throat is clear   Neck:supple with no jvd  Cardiovascular:  S1, S2 without m/r/g   Respiratory:Decreased breath sound   Abdomen: +bs, soft,non

## 2017-10-02 NOTE — FLOWSHEET NOTE
2000 Assessment completed; patient oriented to name and place only; voice soft and whispery but appropriate. Patient remains on continuous CO2 detector via nasal cannula;patient able to squeeze left hand to command, weak on right; bilateral pedal push/pull weak; oral care completed and patient repositioned for comfort; call light within reach. 2140 Patient refusing bath, states \"no, I don't want that\" repeatedly; explained to patient the bathing process and need to change sheets; patient agreeable to allow aly catheter care, gown change and sheet change; patient cooperative with care. Patient encouraged to rest; bed alarm placed on for safety. 0000 Assessment completed; patient remains oriented to name and place only; CO2 detector remains in place; patient repositioned for comfort, call light within reach  0200 Patient repositioned for comfort; oral care completed  0400 Assessment completed; patient allowed staff to reposition her but refused oral care at this time; call light remains within reach. [id number] Dr. Chrystal Dan in to see patient; orders received.   0700 Report given to oncoming staff

## 2017-10-02 NOTE — PROGRESS NOTES
Nutrition Assessment    Type and Reason for Visit: Reassess (TF monitor)    Nutrition Recommendations:  Diet per DrJohn  If pt cannot safely swallow, recommend start with continuous TF of Nepro with goal of 35 ml/hr. Malnutrition Assessment:  · Malnutrition Status: At risk for malnutrition    Nutrition Diagnosis:   · Problem: Inadequate oral intake  · Etiology: related to Insufficient energy/nutrient consumption (confusion)     Signs and symptoms:  as evidenced by NPO status due to medical condition    Nutrition Assessment:  · Subjective Assessment: Pt extubated 3:30 pm yesterday & TF stopped. CVVH stopped 9/29. Per RN with with hx of multiple CVAs, not doing much with rt side.  & with some confusion. Per Dr Racheal Sherman give swallow evaluation a day d/t confusion. Pt had been on Nepro TF during intubation.   BUN 35, Cr 1.4, WBC 15.  · Wound Type: None  · Current Nutrition Therapies:  · Oral Diet Orders: NPO   · Anthropometric Measures:  · Ht: 5' 3\" (160 cm)   · Current Body Wt: 128 lb 8.5 oz (58.3 kg) (+2 pitting edema)  · Admission Body Wt: 107 lb (48.5 kg) (9/27 stated)  · Usual Body Wt: 124 lb (56.2 kg) (11/10/14 per EMR)  · % Weight Change: 17# or 14%,  ~2 years  · Ideal Body Wt: 115 lb (52.2 kg), % Ideal Body 111%  · BMI Classification: BMI 18.5 - 24.9 Normal Weight (22.8)  · Comparative Standards (Estimated Nutrition Needs):  · Estimated Daily Total Kcal: 8780-0181 (30-32/kgm admit wt. of 49kgm)  · Estimated Daily Protein (g): 58-68 grams (1.2-1.4/kgm admit wt. of 49kgm)    Estimated Intake vs Estimated Needs: Intake Improving    Nutrition Risk Level: High    Nutrition Interventions:    (Diet per Dr. Brigido Siddiqui swallow evaluation. )  Continued Inpatient Monitoring, Education not appropriate at this time    Nutrition Evaluation:   · Evaluation: Goals set   · Goals: adequate nutrition within 1-4 days    · Monitoring: NPO Status, Ascites/Edema, Weight, Diet Progression, Pertinent Labs, Chewing/Swallowing    See Adult Nutrition Doc Flowsheet for more detail.      Electronically signed by Alice Landry RD, LD on 10/2/17 at 1:52 PM    Contact Number: [phone]

## 2017-10-02 NOTE — PROGRESS NOTES
Consult received, chart reviewed will follow clinical course including PT/OT progress to determine patient needs.

## 2017-10-02 NOTE — PROGRESS NOTES
[facility]  PHYSICAL THERAPY MISSED TREATMENT NOTE  ACUTE CARE    Date: 10/2/2017  Patient Name: Carles Holstein        MRN: [id number]   : 1935  (80 y.o.)  Gender: female                REASON FOR MISSED TREATMENT:  Missed Treat. Pt with femoral line, waiting to get removed, attempted in am and pm.  Will check back as able.

## 2017-10-02 NOTE — PROGRESS NOTES
Titrated  calcium replacement protocol, , Other, RX Placeholder  magnesium replacement protocol, , Other, RX Placeholder  phosphorus replacement protocol, , Other, RX Placeholder  chlorhexidine (PERIDEX) 0.12 % solution 15 mL, 15 mL, Mouth/Throat, 4x Daily  famotidine (PEPCID) injection 10 mg, 10 mg, Intravenous, BID  aspirin chewable tablet 81 mg, 81 mg, Oral, Once  clopidogrel (PLAVIX) tablet 75 mg, 75 mg, Oral, Once  sodium chloride flush 0.9 % injection 10 mL, 10 mL, Intravenous, PRN  acetaminophen (TYLENOL) tablet 650 mg, 650 mg, Oral, Q4H PRN  HYDROcodone-acetaminophen (NORCO) 5-325 MG per tablet 1 tablet, 1 tablet, Oral, Q4H PRN  ondansetron (ZOFRAN) injection 4 mg, 4 mg, Intravenous, Q8H PRN  iohexol (OMNIPAQUE 180) injection 20 mL, 20 mL, Intravenous, ONCE PRN  morphine (PF) injection 6 mg, 6 mg, Intravenous, Q4H PRN  0.9 % sodium chloride bolus, 250 mL, Intravenous, Once    O:   Vitals:    10/01/17 1903   BP: (!) 142/68   Pulse: 75   Resp: 20   Temp:    SpO2: 94%     NAD   NC/AT  No scleral icterus  No increased work of breathing  NT/ND/+BS  No clubbing/cyanosis  Neuro: A+Ox4, conversant  CN 2-12 intact   Moves all extremities w/ antigravity strength, weaker in R arm  Sensory/Reflexes unchanged      Labs and imaging have been personally reviewed.   Lab Results   Component Value Date    WBC 15.2 10/01/2017    HGB 9.1 10/01/2017    HCT 26.9 10/01/2017    MCV 89.0 10/01/2017    PLT 85 10/01/2017     Lab Results   Component Value Date     10/01/2017    K 4.3 10/01/2017     10/01/2017    CO2 34 10/01/2017    PHOS 3.1 10/01/2017    BUN 28 10/01/2017    CREATININE 1.4 10/01/2017    CALCIUM 7.8 10/01/2017     Lab Results   Component Value Date    INR 0.97 09/30/2017     Lab Results   Component Value Date    APTT 32.5 09/30/2017     Lab Results   Component Value Date    ALKPHOS 104 09/30/2017    ALT 42 09/30/2017    AST 80 09/30/2017    BILITOT 0.4 09/30/2017    BILIDIR <0.2 09/30/2017    LABALBU 2.0 09/30/2017    LIPASE 144.5 11/09/2014    LIPASE 150.0 11/09/2014     Lab Results   Component Value Date    TROPONINI 0.007 07/06/2013      No results found for: LABA1C    No results found for: CHARCSF, CULTURE  No results found for: PHENYTOIN, CARBTOT, PHENOBARB, VALPROATE  No results found for: [facility]    Lab Results   Component Value Date    NITRU NEGATIVE 09/26/2017    COLORU YELLOW 09/26/2017    PHUR 6.0 09/26/2017    LABCAST NONE SEEN 09/26/2017    LABCAST NONE SEEN 09/26/2017    WBCUA 2-4 09/26/2017    RBCUA 0-2 09/26/2017    YEAST NONE SEEN 09/26/2017    BACTERIA NONE 09/26/2017    SPECGRAV >1.030 09/26/2017    LEUKOCYTESUR NEGATIVE 09/26/2017    LEUKOCYTESUR TRACE 11/09/2014    UROBILINOGEN 0.2 09/26/2017    BILIRUBINUR NEGATIVE 09/26/2017    BLOODU NEGATIVE 09/26/2017    KETUA NEGATIVE 09/26/2017

## 2017-10-03 NOTE — PROGRESS NOTES
treatment options, the presence of comorbidities affecting the plan of care and the need for minimal to moderate modifications or assistance required to complete the evaluation. Patient Education:  Patient Education: OT role, POC, safety with  mobility  Barriers to Learning: decreased cognition    Equipment Recommendations:  Equipment Needed: No  Other: Continue to assess    Safety:  Safety Devices in place: Yes  Type of devices: All fall risk precautions in place, Gait belt, Call light within reach, Bed alarm in place    Plan:  Times per week: 5x  Current Treatment Recommendations: Balance Training, Functional Mobility Training, Endurance Training, Self-Care / ADL, Safety Education & Training, Patient/Caregiver Education & Training    Goals:  Patient goals : Pt did not state    Short term goals  Time Frame for Short term goals: 1 week  Short term goal 1: Complete various stand-pivot t/f to [facility] or bedside chair with 0>mod A x 1  Short term goal 2: Complete 1 min standing with min A & min vcs for posture  Short term goal 3: Follow 75% of simple commands to increase participation in ADL tasks  Short term goal 4: Complete BUE gentle AROM exercises to increase AROM BUE for ease of grooming  Short term goal 5: Tolerate further assessment of ambulation when appropriate  Long term goals  Time Frame for Long term goals : No LTG set d/t short ELOS    Evaluation Complexity: Based on the findings of patient history, examination, clinical presentation, and decision making during this evaluation, this patient is of medium complexity.

## 2017-10-03 NOTE — CONSULTS
level tile  Device:  (BUE HHA, cues for RUE suppot)  Assistance: Maximum assistance (to modAx1)  Quality of Gait: fwd flexed posture, shuffing steps, decreased step length RLE more than LLE, pt impulsive, pt wanting to sit before turned fully to chair  Distance: 4 steps,    FIMS:  , PT Equipment Recommendations  Equipment Needed: No, Assessment: pt with generalized weakness with right hemibody weaker than left hemibody, pt confused and when asked about how she was feeling or prior function would state \"I don't know\", safety concerns and recommend +2 for safe transfers at this time with nursing, recommned cont PT to improve pt strength/ROM/balance/endurance for increased functional mobility      Past Medical History:        Diagnosis Date    Alcohol abuse     Carotid artery occlusion 09/18/2017    History of falling     Hyperlipidemia     Hypertension     Low back pain     Movement disorder     Muscle weakness (generalized)     Osteopenia     Scoliosis     lower lumbar Intervertebral Disc Displacement    Vitamin B-complex deficiency        Past Surgical History:        Procedure Laterality Date    HYSTERECTOMY      SHOULDER SURGERY Bilateral        Allergies: Allergies   Allergen Reactions    Celebrex [Celecoxib]     Codeine Other (See Comments)     \"out of it\"    Crestor [Rosuvastatin] Other (See Comments)     Attacked muscles in her body, joints    Ezetimibe-Simvastatin Other (See Comments)     Attacked muscles and joints    Lipitor Other (See Comments)     Attacked every muscle in her body, joints for a year.     Nasonex [Mometasone Furoate]     Other Hives     Bioactive      Pcn [Penicillins] Rash        Current Medications:   Current Facility-Administered Medications: 0.9 % sodium chloride infusion, , Intravenous, Continuous  DOBUTamine (DOBUTREX) 500 mg in dextrose 5 % 250 mL infusion, 2.5 mcg/kg/min (Order-Specific), Intravenous, Continuous  sodium chloride flush 0.9 % injection 10 mL, 10 mL, Intravenous, Q12H  0.9 % sodium chloride infusion, , Intravenous, Continuous  potassium chloride (KLOR-CON M) extended release tablet 40 mEq, 40 mEq, Oral, PRN **OR** potassium chloride 20 MEQ/15ML (10%) oral solution 40 mEq, 40 mEq, Oral, PRN **OR** potassium chloride 10 mEq/100 mL IVPB (Peripheral Line), 10 mEq, Intravenous, PRN  potassium chloride 20 mEq/50 mL IVPB (Central Line), 20 mEq, Intravenous, PRN  epoetin natalia (EPOGEN;PROCRIT) injection 1,000 Units, 1,000 Units, Subcutaneous, Once per day on Tue Thu Sat  propofol 1000 MG/100ML injection, 10 mcg/kg/min, Intravenous, Titrated  calcium replacement protocol, , Other, RX Placeholder  magnesium replacement protocol, , Other, RX Placeholder  phosphorus replacement protocol, , Other, RX Placeholder  chlorhexidine (PERIDEX) 0.12 % solution 15 mL, 15 mL, Mouth/Throat, 4x Daily  famotidine (PEPCID) injection 10 mg, 10 mg, Intravenous, BID  aspirin chewable tablet 81 mg, 81 mg, Oral, Once  clopidogrel (PLAVIX) tablet 75 mg, 75 mg, Oral, Once  sodium chloride flush 0.9 % injection 10 mL, 10 mL, Intravenous, PRN  acetaminophen (TYLENOL) tablet 650 mg, 650 mg, Oral, Q4H PRN  HYDROcodone-acetaminophen (NORCO) 5-325 MG per tablet 1 tablet, 1 tablet, Oral, Q4H PRN  ondansetron (ZOFRAN) injection 4 mg, 4 mg, Intravenous, Q8H PRN  iohexol (OMNIPAQUE 180) injection 20 mL, 20 mL, Intravenous, ONCE PRN  morphine (PF) injection 6 mg, 6 mg, Intravenous, Q4H PRN  0.9 % sodium chloride bolus, 250 mL, Intravenous, Once    Social History:  Social History     Social History    Marital status:      Spouse name: N/A    Number of children: N/A    Years of education: N/A     Occupational History    Not on file.      Social History Main Topics    Smoking status: Former Smoker     Quit date: 7/5/2006    Smokeless tobacco: Not on file    Alcohol use No    Drug use: No    Sexual activity: Not on file     Other Topics Concern    Not on file     Social History Narrative Lives with  in one story home with a small sep to enter into the home with no handrails. Patient was independent until injuring her back early last month. Would drive short distances now and then. No AD needed.  assists as needed. Family History:       Problem Relation Age of Onset    Heart Disease Mother     Hypertension Mother     Heart Disease Father     Cancer Father      colon    Hypertension Sister     Hypertension Brother     Other Brother      myocardial infarction       Review of Systems:  CONSTITUTIONAL:  positive for  fatigue  EYES:  negative  HEENT:  positive for  voice change  RESPIRATORY:  positive for  cough with sputum  CARDIOVASCULAR:  negative  GASTROINTESTINAL:  positive for incontinence  GENITOURINARY:  aly  SKIN:  bruising  HEMATOLOGIC/LYMPHATIC:  positive for swelling/edema  MUSCULOSKELETAL:  positive for  muscle weakness  NEUROLOGICAL:  positive for memory problems, speech problems, coordination problems, gait problems and weakness  BEHAVIOR/PSYCH:  negative  10 point system review otherwise negative    Physical Exam:  BP (!) 157/91  Pulse 66  Temp 99.1 °F (37.3 °C) (Axillary)   Resp 20  Ht 5' 3\" (1.6 m)  Wt 135 lb 5 oz (61.4 kg)  SpO2 98%  BMI 23.97 kg/m2  awake  Orientation:   person, place, time  Mood: decreased range  Affect: calm  General appearance: Patient is well nourished, well developed, well groomed and in no acute distress, appearing stated age    Memory:   abnormal - patient unsure of certain things she has at home (lift chair, AD),   Attention/Concentration: normal  Language:  abnormal - poor vocal quality    Cranial Nerves:  cranial nerves II-XII are grossly intact  ROM:  abnormal - bilateral HF and right arm decreased AROM  Motor Exam:  Left HG/EE/EF/KE/ADF/APF 4- out of 5. Left HF 3 out of 5. Right HG/EE/EF/HF 2+ to 3- out of 5.  Right ADF/APF 3+ out of 5  Tone:  normal  Muscle bulk: within normal limits  Sensory:  Sensory intact    Skin: warm and dry, no rash or erythema and bruising noted through right arm and hand, also in left wrist.   Peripheral vascular: Pulses: Normal upper and lower extremity pulses and Absent or decreased lower extremity pulses; Edema: 3+ right hand    Diagnostics:  Recent Results (from the past 24 hour(s))   Calcium, Ionized    Collection Time: 10/03/17  8:17 PM   Result Value Ref Range    Calcium, Ion 1.10 (L) 1.12 - 1.32 mmol/L   Potassium    Collection Time: 10/03/17  8:17 PM   Result Value Ref Range    Potassium 3.7 3.5 - 5.2 meq/L   Basic Metabolic Panel    Collection Time: 10/04/17  4:48 AM   Result Value Ref Range    Sodium 144 135 - 145 meq/L    Potassium 3.6 3.5 - 5.2 meq/L    Chloride 103 98 - 111 meq/L    CO2 30 23 - 33 meq/L    Glucose 72 70 - 108 mg/dL    BUN 39 (H) 7 - 22 mg/dL    CREATININE 1.0 0.4 - 1.2 mg/dL    Calcium 8.1 (L) 8.5 - 10.5 mg/dL   Magnesium    Collection Time: 10/04/17  4:48 AM   Result Value Ref Range    Magnesium 1.9 1.6 - 2.4 mg/dL   Anion Gap    Collection Time: 10/04/17  4:48 AM   Result Value Ref Range    Anion Gap 11.0 8.0 - 16.0 meq/L   Glomerular Filtration Rate, Estimated    Collection Time: 10/04/17  4:48 AM   Result Value Ref Range    Est, Glom Filt Rate 53 (A) ml/min/1.73m2       Impression:  · Subarachnoid hemorrhage, left frontal parietal occipital 9/28/17  · Left ICA stenosis  · S/p carotid stent 9/26/17  · Aphasia  · Right hemiplegia   · Cognitive deficits   · Dysphagia   · Acute respiratory failure post op stent  · Lumbar pain with radiculopathy   · Alcohol abuse  · Hypertension  · Hyperlipidemia    Recommendations:  · Continue current therapies  · Patient not appropriate for IP Rehab at this time. Patient not able to tolerate 3 hours of therapy a day and is too low level at this time  · Discussed case with SLP and plan to focus further on patients vocal strength and swallowing with FEES. This could be accomplished in TCU setting.    · Plan TCU when bed available, as early

## 2017-10-03 NOTE — PLAN OF CARE
Problem: Nutrition  Goal: Optimal nutrition therapy  Outcome: Ongoing  Nutrition Problem: Inadequate oral intake  Intervention: Food and/or Nutrient Delivery:  (Start oral diet if Dr. Lencho zheng per SLP recommendations.   Will start ONS when diet started.)  Nutritional Goals: adequate nutrition within 1-4 days

## 2017-10-03 NOTE — PROGRESS NOTES
0857    DOBUTamine Stopped (09/30/17 1902)    sodium chloride      propofol Stopped (09/27/17 1045)     potassium chloride **OR** potassium chloride **OR** potassium chloride, potassium chloride, sodium chloride flush, acetaminophen, HYDROcodone 5 mg - acetaminophen, ondansetron, iohexol, morphine      LABS:     CBC:   Recent Labs      10/01/17   0430  10/02/17   0516  10/03/17   0512   WBC  15.2*  15.0*  11.6*   HGB  9.1*  10.0*  9.8*   PLT  85*  90*  101*     BMP:  Recent Labs      10/02/17   0516  10/02/17   1327  10/03/17   0512   NA  141  144  144   K  3.8  3.8  3.4*   CL  99  99  104   CO2  32  32  32   BUN  35*  37*  38*   CREATININE  1.4*  1.2  1.2   GLUCOSE  101  90  83     Calcium:  Recent Labs      10/03/17   0512   CALCIUM  7.5*     Ionized Calcium:No results for input(s): IONCA in the last 72 hours. Magnesium:  Recent Labs      10/02/17   0516   MG  1.9     Phosphorus:  Recent Labs      10/02/17   0516   PHOS  3.4     BNP:No results for input(s): BNP in the last 72 hours. Glucose:No results for input(s): POCGLU in the last 72 hours. HgbA1C: No results for input(s): LABA1C in the last 72 hours. INR: No results for input(s): INR in the last 72 hours. Hepatic: No results for input(s): ALKPHOS, ALT, AST, PROT, BILITOT, BILIDIR, LABALBU in the last 72 hours. Amylase and Lipase:No results for input(s): LACTA, AMYLASE in the last 72 hours. Lactic Acid: No results for input(s): LACTA in the last 72 hours. Troponin: No results for input(s): CKTOTAL, CKMB, TROPONINI in the last 72 hours. BNP: No results for input(s): BNP in the last 72 hours. CULTURES:   UA: No results for input(s): SPECGRAV, PHUR, COLORU, CLARITYU, MUCUS, PROTEINU, BLOODU, RBCUA, WBCUA, BACTERIA, NITRU, GLUCOSEU, BILIRUBINUR, UROBILINOGEN, KETUA, LABCAST, LABCASTTY, AMORPHOS in the last 72 hours.     Invalid input(s): CRYSTALS  Micro:   Lab Results   Component Value Date    BC No growth-preliminary  No growth   11/09/2014

## 2017-10-03 NOTE — PROGRESS NOTES
Pt refusing ABG's this am. Pt stated, \"I don't have to do anything I don't want to do\". Pt educated on importance of ABG's. Pt continues to refuse. Call light in reach, continuing to monitor.

## 2017-10-03 NOTE — PROGRESS NOTES
Pt admitted to  4A06  Complaints: s/p stenting  IV in Left upper extremity. IV site free of s/s of infection or infiltration. Vital signs obtained. Assessment and data collection initiated. Oriented to room. Policies and procedures for 4a explained All questions answered with no further questions at this time. Fall prevention and safety brochure discussed with patient.    Daisy Corrales 10/2/2017 10:58 PM

## 2017-10-03 NOTE — PLAN OF CARE
Problem: Falls - Risk of  Goal: Absence of falls  Outcome: Ongoing  Patient absent of falls this shift. Bed in lowest position, side rails up 2/4. Call-light within reach. Pt bedrest this shift. PT to work with pt tomorrow. Problem: Discharge Planning:  Goal: Discharged to appropriate level of care  Discharged to appropriate level of care   Outcome: Ongoing  Pt from [facility]. Discharge plans remain in progress at this time. Problem: Tissue Perfusion - Peripheral, Altered:  Goal: Circulatory function of lower extremities is within specified parameters  Circulatory function of lower extremities is within specified parameters   Outcome: Ongoing  Patient has weak pedal pulses bilaterally. Extremities cool. Continuing to monitor pulses and capillary refill this shift. Problem: Pain:  Goal: Pain level will decrease  Pain level will decrease   Outcome: Ongoing  Patient unable to verbally rate pain at this time. Pain 0 using Faces scale. Will continue to monitor. Goal: Control of acute pain  Control of acute pain   Outcome: Ongoing  Patient unable to verbally rate pain at this time. Pain 0 using Faces scale. Will continue to monitor. Goal: Control of chronic pain  Control of chronic pain   Outcome: Ongoing    Problem: Bleeding:  Goal: Will show no signs and symptoms of excessive bleeding  Will show no signs and symptoms of excessive bleeding   Outcome: Ongoing  No signs of bleeding this shift. Patient's left femoral site dressing clean and dry. No signs of hematoma or bleeding at left femoral site. Problem: Nutrition  Goal: Optimal nutrition therapy  Outcome: Ongoing  Patient NPO at this time. Speech to evaluate patient tomorrow. Previous PEG tube has been removed. Will continue to monitor. Problem: Airway Clearance - Ineffective:  Goal: Ability to maintain a clear airway will improve  Ability to maintain a clear airway will improve   Outcome: Ongoing  Suction at bedside.  Patient remains NPO at stable or improving  Outcome: Ongoing  OMAIRA pt's orientation level at this time. Pt does withdraw from painful stimuli in RUE, RLE, and LLE. Pt does have nonpuroposeful movement in LUE. Pt's pupils equal and reactive. Pt has weak left hand grasp. Pt positive CVA. Pt's NIH 14 at this time. Pt not following all commands. Will continue neuro assessments q 4 hrs and prn. Comments:   Care plan reviewed with patient. Patient unable to verbalize understanding of the plan of care and contribute to goal setting at this time.

## 2017-10-03 NOTE — PROGRESS NOTES
[address] [address]  INPATIENT PHYSICAL THERAPY  EVALUATION  [facility] NEUROSCIENCES 4A    Time In: 0150  Time Out: 0820  Timed Code Treatment Minutes: 8 Minutes  Minutes: 25          Date: 10/3/2017  Patient Name: Abram Lindsey,  Gender:  female        MRN: [id number]  : 1935  (80 y.o.)      Referring Practitioner: Dr. Yousif Jackson  Diagnosis: carotid stenosis  Additional Pertinent Hx: admit with above diagnosis, ZAIRA, hypotension, left artery carotid setnosis s/p stenting     Past Medical History:   Diagnosis Date    Alcohol abuse     Carotid artery occlusion 2017    History of falling     Hyperlipidemia     Hypertension     Low back pain     Movement disorder     Muscle weakness (generalized)     Osteopenia     Scoliosis     lower lumbar Intervertebral Disc Displacement    Vitamin B-complex deficiency      Past Surgical History:   Procedure Laterality Date    HYSTERECTOMY      SHOULDER SURGERY Bilateral        Restrictions/Precautions:  Restrictions/Precautions: General Precautions, Fall Risk    Subjective:  Chart Reviewed: Yes  Patient assessed for rehabilitation services?: Yes  Family / Caregiver Present: No  Subjective: required encouragement for OOB to chair, pt fatigued quickly, no family present, pt incontinent of bowel upon arrival    General:    Pain:  Denies.   Pain Assessment  Pain Level: 0    Social/Functional History:    Type of Home: Facility  Home Layout: One level   Ambulation Assistance: Needs assistance  Transfer Assistance: Needs assistance  Additional Comments: pt confused and no family present, when therapist asked pt what prior level of function was she stated \"I don't know\"    Objective:       RLE AROM: Exceptions  RLE General AROM: A/AAROM WFL    LLE AROM : Exceptions  LLE General AROM: A/AAROM WFL      Strength RLE: Exception  Comment: grossly 1 to 2-/5, pt difficulty following commands for MMT    Strength LLE: Exception  Comment: grossly 3 to 3-/5 Balance  Sitting - Static: Fair  Sitting - Dynamic: Fair  Standing - Static: Poor, +  Standing - Dynamic: Poor  Comments: pt sat edge of bed with Rubia to CGA, pt std with BUE HHA , cue for RUE support, flexed posture modAx1    Rolling to Right: Maximum assistance (x1, cues for hooklying and use of BR)  Supine to Sit: Maximum assistance (x1, HOB up min)  Scooting: Maximal assistance (x1, in sitting fwd to edge of bed)    Transfers  Sit to Stand: Moderate Assistance (x1, BUE HHA, cues for wt shift fwd, cues for RUE support at all times, from edge of bed)  Stand to sit: Maximum Assistance (to bedside chair, pt impulsive to sit, cues to turn fully to chair before sitting)       Ambulation 1  Surface: level tile  Device:  (BUE HHA, cues for RUE suppot)  Assistance: Maximum assistance (to modAx1)  Quality of Gait: fwd flexed posture, shuffing steps, decreased step length RLE more than LLE, pt impulsive, pt wanting to sit before turned fully to chair  Distance: 4 steps               Exercises:  Comments: none          Activity Tolerance:  Activity Tolerance: Patient limited by fatigue;Patient limited by endurance; Patient limited by cognitive status    Treatment Initiated:pt sat edge of with uRbia to CGA, cues for erect posture, pt static std with BUE HHA and modAx1 while tech cleaned up pt, tolerated around 1 min, cues for erect posture and RUE support    Assessment:   Body structures, Functions, Activity limitations: Decreased functional mobility , Decreased balance, Decreased ROM, Decreased strength, Decreased safe awareness, Decreased cognition, Decreased endurance, Decreased coordination  Assessment: pt with generalized weakness with right hemibody weaker than left hemibody, pt confused and when asked about how she was feeling or prior function would state \"I don't know\", safety concerns and recommend +2 for safe transfers at this time with nursing, recommned cont PT to improve pt strength/ROM/balance/endurance for increased functional mobility  Prognosis: Good    Clinical Presentation: Moderate - Evolving with Changing Characteristics: pt with increased weakness right hemibody more than left hemibody, confusion noted, difficulty following commands at times, +2 for safety, recommend cont PT    Decision Making: High Complexitybased on patient assessment and decision making process of determining plan of care and establishing reasonable expectations for measurable functional outcomes    REQUIRES PT FOLLOW UP: Yes  Discharge Recommendations: Continue to assess pending progress, Patient would benefit from continued therapy after discharge, [address], ECF with PT    Patient Education:  Patient Education: POC  Barriers to Learning: cognition    Equipment Recommendations:  Equipment Needed: No    Safety:  Type of devices:  All fall risk precautions in place, Call light within reach, Nurse notified, Gait belt, Patient at risk for falls, Left in chair, Chair alarm in place    Plan:  Times per week: 5X N  Times per day: Daily  Specific instructions for Next Treatment: therex and mobility, sitting balance, std pregait activity  Current Treatment Recommendations: Strengthening, ROM, Endurance Training, Equipment Evaluation, Education, & procurement, Cognitive Reorientation, Balance Training, Functional Mobility Training, Gait Training, Home Exercise Program, Transfer Training, Safety Education & Training, Patient/Caregiver Education & Training    Goals:  Patient goals : not stated, pt confused  Short term goals  Time Frame for Short term goals: 2 weeks  Short term goal 1: bed mobility with modAx1 to get in/out of bed  Short term goal 2: transfer with minAx1 to get in/out of chairs  Short term goal 3: amb 5'x1 with UE support and modAx1 to walk safely bed to/from chair  Long term goals  Time Frame for Long term goals : no LTGs set secondary to pamot CHRISTALOS    Evaluation Complexity: Based on the findings of patient history, examination, clinical presentation, and decision making during this evaluation, the evaluation of Satnam Done  is of medium complexity. PT G-Codes  Functional Assessment Tool Used: professional judgement  Functional Limitation: Mobility: Walking and moving around  Mobility: Walking and Moving Around Current Status (): At least 60 percent but less than 80 percent impaired, limited or restricted  Mobility: Walking and Moving Around Goal Status ():  At least 40 percent but less than 60 percent impaired, limited or restricted

## 2017-10-03 NOTE — PROGRESS NOTES
RX Placeholder  chlorhexidine (PERIDEX) 0.12 % solution 15 mL, 15 mL, Mouth/Throat, 4x Daily  famotidine (PEPCID) injection 10 mg, 10 mg, Intravenous, BID  aspirin chewable tablet 81 mg, 81 mg, Oral, Once  clopidogrel (PLAVIX) tablet 75 mg, 75 mg, Oral, Once  sodium chloride flush 0.9 % injection 10 mL, 10 mL, Intravenous, PRN  acetaminophen (TYLENOL) tablet 650 mg, 650 mg, Oral, Q4H PRN  HYDROcodone-acetaminophen (NORCO) 5-325 MG per tablet 1 tablet, 1 tablet, Oral, Q4H PRN  ondansetron (ZOFRAN) injection 4 mg, 4 mg, Intravenous, Q8H PRN  iohexol (OMNIPAQUE 180) injection 20 mL, 20 mL, Intravenous, ONCE PRN  morphine (PF) injection 6 mg, 6 mg, Intravenous, Q4H PRN  0.9 % sodium chloride bolus, 250 mL, Intravenous, Once    O:   Vitals:    10/02/17 1841   BP:    Pulse: 67   Resp: 28   Temp: 98.8 °F (37.1 °C)   SpO2: 96%     NAD   NC/AT  No scleral icterus  No increased work of breathing  NT/ND/+BS  No clubbing/cyanosis  Neuro: A+Ox4  CN 2-12 intact   Moves all extremities w/ antigravity strength, weaker RUE  Sensory/Reflexes unchanged      Labs and imaging have been personally reviewed.   Lab Results   Component Value Date    WBC 15.0 10/02/2017    HGB 10.0 10/02/2017    HCT 29.1 10/02/2017    MCV 89.6 10/02/2017    PLT 90 10/02/2017     Lab Results   Component Value Date     10/02/2017    K 3.8 10/02/2017    CL 99 10/02/2017    CO2 32 10/02/2017    PHOS 3.4 10/02/2017    BUN 37 10/02/2017    CREATININE 1.2 10/02/2017    CALCIUM 8.0 10/02/2017     Lab Results   Component Value Date    INR 0.97 09/30/2017     Lab Results   Component Value Date    APTT 32.5 09/30/2017     Lab Results   Component Value Date    ALKPHOS 104 09/30/2017    ALT 42 09/30/2017    AST 80 09/30/2017    BILITOT 0.4 09/30/2017    BILIDIR <0.2 09/30/2017    LABALBU 2.0 09/30/2017    LIPASE 144.5 11/09/2014    LIPASE 150.0 11/09/2014     Lab Results   Component Value Date    TROPONINI 0.007 07/06/2013      No results found for: LABA1C    No

## 2017-10-03 NOTE — PROGRESS NOTES
voicing, poor attention and impaired cognition. RECOMMENDATIONS:     Modified Barium Swallow: [] Is indicated to further assess    [x] Is NOT indicated at this time; Will recommend as        appropriate. DIET RECOMMENDATIONS:  Dysphagia I with honey thick liquids     STRATEGIES: [] Strategies pending MBS results. [x] Full upright position  [x] Small bite/sip [] No Straw [] Multiple Swallow  [] Chin tuck [] Head turn [x] Pulmonary monitoring [] Oral care after all meals  [] Supervision  [x] Medication in applesauce [x]Direct 1:1 Supervision  [] Spoon all liquids [x] Alternate solid / liquid [x] Limit distractions [x] Monitor for fatigue  [] PMV in place for all po [] OTHER:      EDUCATION:   Learner: [x]Patient [] Significant other [] Son/Daughter [] Parent     [] Other:   Education: [x] Reviewed results and recommendations of this evaluation     [x] Reviewed diet and strategies     [x] Reviewed signs, symptoms and risk of aspiration     [] Demonstrated how to thick liquid appropriately. [x] Reviewed goals and Plan of Care     [] OTHER:   Method: [x] Discussion [x] Demonstration [] Hand-out     [] OTHER:   Evaluation of Education:     [] Verbalizes understanding [] Demonstrates with assistance     [] Demonstrates without assistance [x]Needs further instruction     [x] No evidence of learning  [x] Family not present    PATIENT GOALS: [x] Pt did not state. Will further assess during treatment. [] Return to the least restricted diet possible     [] Return to previous level of function     [] OTHER:    PLAN / TREATMENT RECOMMENDATIONS:  [x] Speech Therapy evaluation to assess speech, language, cognition and/or voice  [x] Skilled SLP intervention on acute care 3-5 x per week or until goals met and/or pt plateaus in function. Specific interventions for next session may include: dysphagia.     SHORT TERM GOALS:  Short-term Goals  Timeframe for Short-term Goals: 2 weeks  Goal 1: Patient will tolerate dysphagia I with honey thick liquids without s/s of aspiration in order to safely maintain adequate hydration and nutrition. Goal 2: Patient will complete advanced trials x10 without s/s of aspiration in order to safley determine safety of diet upgrade. Goal 3: Monitor pulmonary status closely and complete instrumental evaluation as appropraite. Goal 4: Complete full ST evaluation and determine goals.             Min Plasencia M.S. Lefty [address]

## 2017-10-03 NOTE — PROGRESS NOTES
Renal Progress Note    Assessment and Plan: 1. Acute kidney injury mostly pre-renal   2. Hypertension  3. Leukocytosis   4. Hypokalemia   5. Anemia   6. Thromcytopenia   7. Deconditioning   8. Mental confusion ? PLAN:  Reviewed labs   Reviewed medications   KCL 40 meq IVPB once   AM labs   Feeding ?   Will consider IV fluid if no oral or tube feeding  Discussed with staff    Patient Active Problem List:     TIA (transient ischemic attack)     Hyponatremia     Obesity     Abnormal resting ECG findings     Jaundice     Fatigue     Hypokalemia     Benign hypertension     Hyperlipidemia     Elevated LFTs     Elevated lipase     Encounter for diagnostic colonoscopy due to change in bowel habits     Cerebrovascular accident (CVA) due to stenosis of left carotid artery (HCC)     Chronic congestive heart failure with left ventricular diastolic dysfunction (HCC)     Encephalopathy     Cardiac arrest ([facility] [address]     Stupor     Acute respiratory failure with hypercapnia (HCC)     ZAIRA (acute kidney injury) ([facility] [address]     Metabolic acidosis     Metabolic alkalosis     Hypotension      Subjective:   Admit Date: 9/26/2017    Interval History:   Seeing for acute kidney injury   Awake and alert this am  Has no concern  Updated by the staff  Blood pressure is stable  Good urine output       Medications:   Scheduled Meds:   potassium chloride  40 mEq Intravenous Once    sodium chloride flush  10 mL Intravenous Q12H    epoetin  1,000 Units Subcutaneous Once per day on Tue Thu Sat    calcium replacement protocol   Other RX Placeholder    magnesium replacement protocol   Other RX Placeholder    phosphorus replacement protocol   Other RX Placeholder    chlorhexidine  15 mL Mouth/Throat 4x Daily    famotidine (PEPCID) injection  10 mg Intravenous BID    aspirin  81 mg Oral Once    clopidogrel  75 mg Oral Once    sodium chloride  250 mL Intravenous Once     Continuous Infusions:   sodium chloride 10 mL/hr at 09/30/17 0902    DOBUTamine Stopped remember name of the hospital       Electronically signed by Romie Renteria MD on 10/3/2017 at 7:07 AM

## 2017-10-03 NOTE — PROGRESS NOTES
Nutrition Assessment    Type and Reason for Visit: Reassess    Nutrition Recommendations: Diet initiation per SLP recommendations when appropriate. Plan ONS start when diet initiated. Malnutrition Assessment:  · Malnutrition Status: At risk for malnutrition    Nutrition Diagnosis:   · Problem: Inadequate oral intake  · Etiology: related to Insufficient energy/nutrient consumption     Signs and symptoms:  as evidenced by NPO status due to medical condition    Nutrition Assessment:  · Subjective Assessment: S/P extubation 10/2/17, feeding tube pulled and thus TF's stopped. SLP saw this morning for swallow evaluation, recommending dysphagia I with honey thickened liquids per RN, Jenna Campos, awaiting Dr. Mari zheng to start diet. Pt., spouse and son seen. Reports pt. typically eats small frequent meals at home, denies any weight loss prior to admit, pt. reports usual weight~ 114#. Agreed to magic cups when diet started. · Wound Type: None  · Current Nutrition Therapies:  · Oral Diet Orders: NPO   · Anthropometric Measures:  · Ht: 5' 3\" (160 cm)   · Current Body Wt: 125 lb 14.4 oz (57.1 kg) (10/3/17 +2 RUE, +2 RLE, +3 LLE edema)  · Admission Body Wt: 107 lb (48.5 kg) (9/27 stated)  · Usual Body Wt:  (~ 114# per pt. Note 124# 11/10/14 per EMR.)  · Ideal Body Wt: 115 lb (52.2 kg),  · BMI Classification: BMI 18.5 - 24.9 Normal Weight  · Comparative Standards (Estimated Nutrition Needs):  · Estimated Daily Total Kcal: 1803-9009 (30-32/kgm admit wt. of 49kgm)  · Estimated Daily Protein (g): 58-68 grams (1.2-1.4/kgm admit wt. of 49kgm)    Estimated Intake vs Estimated Needs: Intake Less Than Needs    Nutrition Risk Level: High    Nutrition Interventions:    (Start oral diet if Dr. Lencho zheng per SLP recommendations.   Will start ONS when diet started.)  Continued Inpatient Monitoring, Education Initiated (Encouraged oral intake when diet started.)    Nutrition Evaluation:   · Evaluation: Goals set   · Goals: adequate nutrition within 1-4 days    · Monitoring: NPO Status, Diet Progression, Meal Intake, Supplement Intake, Diet Tolerance, Ascites/Edema, Weight, Pertinent Labs, Chewing/Swallowing    See Adult Nutrition Doc Flowsheet for more detail.      Electronically signed by Nargis Serna RD, SUKUMAR on 10/3/17 at 11:51 AM    Contact Number: [phone]

## 2017-10-04 NOTE — PROGRESS NOTES
Pt refusing to be turned and cleaned up at this time. Pt also pulling off nasal cannula. This RN educated pt that her nasal cannula is vital for her and helping her to breathe. Pt stated, \"just stop; just leave me alone. I want to be left alone. I'm done, I'm just done\". This RN asked pt what she was referring to when she stated, \"I'm done\". Pt stated, \"this, I'm just done with all of this, just leave me alone, you guys are never satisfied\". It was noted by this RN that while pt was agitated, she did move right upper extremity in air. Nasal cannula reapplied on pt and pt agreed to wear at this time. Continuous pulse ox applied on pt d/t pt continuously pulling off nasal cannula and pt agreeable to keep on at this time. Call light within reach, continuing to monitor.

## 2017-10-04 NOTE — PROGRESS NOTES
Chest xray report now available and reviewed   Possible pneumonia and new pleural effusion with no CHF  1000 mg vancomycin once IVPB for possible hospital acquired pneumonia   Azithromycin 250 mg IVPB daily as she has PCN allergy   This is an updated from earlier note

## 2017-10-04 NOTE — PLAN OF CARE
Aspiration:  Goal: Absence of aspiration  Absence of aspiration   Outcome: Ongoing  Patient remains on Dysphagia I diet with Honey thick liquids at this time. Patient tolerating diet. Suction remains at beside. Continuing to monitor lung sounds. Problem: Bowel Function - Altered:  Goal: Bowel elimination is within specified parameters  Bowel elimination is within specified parameters   Outcome: Ongoing  Bowel sounds active X4. Patient with bowel movement this shift. Problem: Cardiac Output - Decreased:  Goal: Hemodynamic stability will improve  Hemodynamic stability will improve   Outcome: Ongoing  Vitals stable. Continuing to monitor. 10/03/17 2026   Vital Signs   Temp 97.9 °F (36.6 °C)   Temp Source Oral   Pulse 71   Heart Rate Source Monitor   Resp 20   BP (!) 152/70   BP Location Left Arm   BP Upper/Lower Lower   Patient Currently in Pain Denies         Problem: Fluid Volume - Imbalance:  Goal: Absence of imbalanced fluid volume signs and symptoms  Absence of imbalanced fluid volume signs and symptoms   Outcome: Ongoing  Vitals stable at this time. Patient having adequate urinary output. Continuing to monitor. Problem: Gas Exchange - Impaired:  Goal: Levels of oxygenation will improve  Levels of oxygenation will improve   Outcome: Ongoing  Patient's SpO2 remains >92%. Patient remains on 4L of oxygen per nasal cannula. Will continue to monitor. Problem: NEUROLOGICAL DEFICIT  Goal: Neurological status is stable or improving  Outcome: Ongoing  Patient alert and oriented to person and time. Patient with some drift noted in RUE. No drift in LUE. Pt's pupils equal and reactive. Pt has weak left hand grasp. Pt with weak plantar flexion and moderate dorsiflexion bilaterally. Pt positive CVA. Pt's NIH 9 at this time. Will continue neuro assessments q 4 hrs and prn. Comments:   Care plan reviewed with patient. Patient verbalizes understanding of the plan of care and contributes to goal setting.

## 2017-10-04 NOTE — PROGRESS NOTES
[facility] NEUROSCIENCES 4A  Speech  Language  Cognitive Evaluation    SLP Individual Minutes  Time In: 7039  Time Out: 0239  Minutes: 13          Date: 10/4/2017  Patient Name: Poonam Myers      MRN: [id number]    : 1935  (80 y.o.)  Gender: female   Referring Physician:  Dr. Madrigal November  Diagnosis: Carotid Stenosis  Secondary Diagnosis:  Dysphagia, Cognitive Defecits  History of Present Illness/Injury: Patient admitted to [facility] with above dx. See physician H&P for full report. Per chart review patient extubated 10/01 with NPO recommendations and TF to follow. Pt intubated from  to 10/1 (~5 days). Speech therapy ordered to assess communication skills. Past Medical History:   Diagnosis Date    Alcohol abuse     Carotid artery occlusion 2017    History of falling     Hyperlipidemia     Hypertension     Low back pain     Movement disorder     Muscle weakness (generalized)     Osteopenia     Scoliosis     lower lumbar Intervertebral Disc Displacement    Vitamin B-complex deficiency        Precautions: Fall, aspiration   Pain:  Did not state    Subjective:  Pt seen in recliner during evaluation. Pt's  and son present at the start of evaluation. Followed up with them following evaluation. SOCIAL HISTORY: Pt reports her and  did cooking and grocery shopping together prior to admission.  primarily responsible for finances. Pt does not currently drive. Information confirmed with son and . ORAL MOTOR:  Labial / Facial: Decreased labial ROM, Facial WNL  Lingual: Decreased ROM      SPEECH / VOICE:  Pt presents with severe aphonia. Family reports this has been occurring since being extubated. LANGUAGE:  Receptive:  1 step commands: 3/3  Simple yes/no: 1/2, Pt unable to determine if her voice problems were a new/old problem.   ID objects/pictures (f=2): 3/3    Expressive:  Automatic speech: Pt able to count to 10 per week or until goals met and/or pt plateaus in function. Specific interventions for next session may include: Dysphagia and Cognition Therapy      SHORT TERM GOALS:   Short-term Goals  Timeframe for Short-term Goals: 2 weeks  Goal 1: Patient will tolerate dysphagia I with honey thick liquids without s/s of aspiration in order to safely maintain adequate hydration and nutrition. Goal 2: Patient will complete advanced trials x10 without s/s of aspiration in order to safley determine safety of diet upgrade. Goal 3: Monitor pulmonary status and vocing closely and complete instrumental evaluation as appropraite. Goal 4: Pt will complete delayed recall and working memory tasks with 80% accuracy given mod cues to improve overall memory fucntion. Goal 5: Pt will complete thought organization and fucntional reasoning tasksw with 80% accuracy to improve independence for ADLs. MARTA Watson.  Speech Intern  Sophiecilla Gosselin M.S. [id number]

## 2017-10-04 NOTE — PROGRESS NOTES
Pharmacy Medication History Note      List of current medications patient is taking is complete. Source of information: [address] list    Changes made to medication list:    Medications removed (include reason, ex. therapy complete or physician discontinued):  · MOM (therapy complete)  · MVI (therapy complete)  · Tramadol (therapy complete, 1 time dose)  · Omeprazole 20mg (dose change)  · Norvasc 5mg (dose change)    Medications added/doses adjusted:  · Norvasc 2.5mg daily  · Omeprazole 40mg daily  · Claritin daily prn    Other notes (ex. Recent course of antibiotics, Coumadin dosing):  · Denies use of other OTC or herbal medications. Allergies reviewed      Electronically signed by Kwaku Stuart.  Perlita Ovalles on 10/4/2017 at 8:54 AM

## 2017-10-04 NOTE — PROGRESS NOTES
[address] [address]  INPATIENT PHYSICAL THERAPY  DAILYNOTE  [facility] NEUROSCIENCES 4A    Time In: 1024  Time Out: 1104  Timed Code Treatment Minutes: 40 Minutes  Minutes: 40          Date: 10/4/2017  Patient Name: Yuly Fatima,  Gender:  female        MRN: [id number]  : 1935  (80 y.o.)     Referring Practitioner: Dr. Nat Orellana  Diagnosis: carotid stenosis  Additional Pertinent Hx: admit with above diagnosis, ZAIRA, hypotension, left artery carotid setnosis s/p stenting     Past Medical History:   Diagnosis Date    Alcohol abuse     Carotid artery occlusion 2017    History of falling     Hyperlipidemia     Hypertension     Low back pain     Movement disorder     Muscle weakness (generalized)     Osteopenia     Scoliosis     lower lumbar Intervertebral Disc Displacement    Vitamin B-complex deficiency      Past Surgical History:   Procedure Laterality Date    HYSTERECTOMY      SHOULDER SURGERY Bilateral        Restrictions/Precautions:  Restrictions/Precautions: General Precautions, Fall Risk            Position Activity Restriction  Other position/activity restrictions: painful RUE         Subjective:     Subjective: pt up in recliner chair on arrival needed encouragement to participate with therapy rosa towards the end of session, pt cont to be confused and difficulty at times answering questions and will quickly \"no\" or \"I don't know\"     Pain:   .  Pain Assessment  Pain Level: 0 (when asked if she has pain she states \"no\" however throughout session she would c/o of pain pt is very sensative to touch and doesnt like the gait belt due to pain will try to pad it tomorrow)       Social/Functional:  Type of Home: Facility  Home Layout: One level     Objective:  Sit to Supine: Maximum assistance (at trunk and LEs once in bed pt was fatiuged and needed much encouragement to get her to assist with repositioning in bed and needed max assist at shoulders and hips, and then dependent x 2 to boost up in bed )  Scooting: Minimal assistance (to scoot to edge of surface with extra time given, when scooting back in chair or bed needed max assist )    Transfers  Sit to Stand: Moderate Assistance (mod assist of one and assist of another at her right side, pt stood another attempt with max assist of one at her right side she needed tactile cues for wt shift forward and to push through her UEs on chair she took 2 attempts to come to stand the last time and then refused to attempt another stand)  Stand to sit: Moderate Assistance (of 2 persons poor control on descend and pt didnt reach back she was fatigued from standing activity)  Squat Pivot Transfers: Moderate Assistance (from recliner to w/c and w/c to bed )       Ambulation 1  Device: Hand-Held Assist  Assistance:  Moderate assistance of one person and assist of another )  Quality of Gait: flexed posture pt needed tactile cues for posture, pt decreased step length only able to advance feet a few inches at a time and lacking heel strike at daniela LE she has difficulty advancing both feet and gave facilitation at hips to help with wt shifting however this was painful for pt when tactile cues given for posture and wt shifting  Distance: a few feet forward then pulled w/c up behind her    W/C MOBILITY: pt propelled w/c for a distance 10 feet using left LE and left UE pt needed max assist to guide the w/c and max verbal and tactile cues to complete this distance, she was however sitting in midline and holding head up in the w/c in the open halls, when asked her to lock the breaks with extra time she was able to complete at left UE however needed assist at right break both times         Balance  Comments: pt completed sitting balance activity with CGA to min assist for midline pt had slight lean to the right she was able to correct with verbal and tactile cues at right UE to extend elbow, standing balance with UE at support needed hand over hand to keep right UE at support Needed: No    Safety:  Type of devices: All fall risk precautions in place, Call light within reach, Nurse notified, Gait belt, Patient at risk for falls, Left in chair, Chair alarm in place    Plan:  Times per week: 5X N  Times per day: Daily  Specific instructions for Next Treatment: therex and mobility, sitting balance, std pregait activity  Current Treatment Recommendations: Strengthening, ROM, Endurance Training, Equipment Evaluation, Education, & procurement, Cognitive Reorientation, Balance Training, Functional Mobility Training, Gait Training, Home Exercise Program, Transfer Training, Safety Education & Training, Patient/Caregiver Education & Training    Goals:  Patient goals : not stated, pt confused    Short term goals  Time Frame for Short term goals: 2 weeks  Short term goal 1: bed mobility with modAx1 to get in/out of bed  Short term goal 2: transfer with minAx1 to get in/out of chairs  Short term goal 3: amb 5'x1 with UE support and modAx1 to walk safely bed to/from chair    Long term goals  Time Frame for Long term goals : no LTGs set secondary to vin AMADOR    PT G-Codes  Functional Assessment Tool Used: professional judgement  Functional Limitation: Mobility: Walking and moving around  Mobility: Walking and Moving Around Current Status (): At least 60 percent but less than 80 percent impaired, limited or restricted  Mobility: Walking and Moving Around Goal Status ():  At least 40 percent but less than 60 percent impaired, limited or restricted

## 2017-10-04 NOTE — PROGRESS NOTES
solution 15 mL, 15 mL, Mouth/Throat, 4x Daily  famotidine (PEPCID) injection 10 mg, 10 mg, Intravenous, BID  aspirin chewable tablet 81 mg, 81 mg, Oral, Once  clopidogrel (PLAVIX) tablet 75 mg, 75 mg, Oral, Once  sodium chloride flush 0.9 % injection 10 mL, 10 mL, Intravenous, PRN  acetaminophen (TYLENOL) tablet 650 mg, 650 mg, Oral, Q4H PRN  HYDROcodone-acetaminophen (NORCO) 5-325 MG per tablet 1 tablet, 1 tablet, Oral, Q4H PRN  ondansetron (ZOFRAN) injection 4 mg, 4 mg, Intravenous, Q8H PRN  iohexol (OMNIPAQUE 180) injection 20 mL, 20 mL, Intravenous, ONCE PRN  morphine (PF) injection 6 mg, 6 mg, Intravenous, Q4H PRN  0.9 % sodium chloride bolus, 250 mL, Intravenous, Once    O:   Vitals:    10/03/17 2026   BP: (!) 152/70   Pulse: 71   Resp: 20   Temp: 97.9 °F (36.6 °C)   SpO2: 93%     NAD   NC/AT  No scleral icterus  No increased work of breathing  NT/ND/+BS  No clubbing/cyanosis  Neuro: A+Ox3  CN 2-12 intact   R arm stronger today, otherwise full strength  Sensory/Reflexes unchanged      Labs and imaging have been personally reviewed.   Lab Results   Component Value Date    WBC 11.6 10/03/2017    HGB 9.8 10/03/2017    HCT 29.3 10/03/2017    MCV 90.3 10/03/2017     10/03/2017     Lab Results   Component Value Date     10/03/2017    K 3.4 10/03/2017     10/03/2017    CO2 32 10/03/2017    PHOS 3.4 10/02/2017    BUN 38 10/03/2017    CREATININE 1.2 10/03/2017    CALCIUM 7.5 10/03/2017     Lab Results   Component Value Date    INR 0.97 09/30/2017     Lab Results   Component Value Date    APTT 32.5 09/30/2017     Lab Results   Component Value Date    ALKPHOS 104 09/30/2017    ALT 42 09/30/2017    AST 80 09/30/2017    BILITOT 0.4 09/30/2017    BILIDIR <0.2 09/30/2017    LABALBU 2.0 09/30/2017    LIPASE 144.5 11/09/2014    LIPASE 150.0 11/09/2014     Lab Results   Component Value Date    TROPONINI 0.007 07/06/2013      No results found for: LABA1C    No results found for: CHARCSF, CULTURE  No results found for: PHENYTOIN, CARBTOT, PHENOBARB, VALPROATE  No results found for: [facility]    Lab Results   Component Value Date    NITRU NEGATIVE 09/26/2017    COLORU YELLOW 09/26/2017    PHUR 6.0 09/26/2017    LABCAST NONE SEEN 09/26/2017    LABCAST NONE SEEN 09/26/2017    WBCUA 2-4 09/26/2017    RBCUA 0-2 09/26/2017    YEAST NONE SEEN 09/26/2017    BACTERIA NONE 09/26/2017    SPECGRAV >1.030 09/26/2017    LEUKOCYTESUR NEGATIVE 09/26/2017    LEUKOCYTESUR TRACE 11/09/2014    UROBILINOGEN 0.2 09/26/2017    BILIRUBINUR NEGATIVE 09/26/2017    BLOODU NEGATIVE 09/26/2017    KETUA NEGATIVE 09/26/2017

## 2017-10-04 NOTE — PROGRESS NOTES
10/02/17   0516  10/03/17   0512   WBC  15.0*  11.6*   HGB  10.0*  9.8*   PLT  90*  101*     CMP:  Recent Labs      10/02/17   1327  10/03/17   0512  10/03/17   2017  10/04/17   0448   NA  144  144   --   144   K  3.8  3.4*  3.7  3.6   CL  99  104   --   103   CO2  32  32   --   30   BUN  37*  38*   --   39*   CREATININE  1.2  1.2   --   1.0   GLUCOSE  90  83   --   72   CALCIUM  8.0*  7.5*   --   8.1*   LABGLOM  43*  43*   --   53*     Troponin: No results for input(s): TROPONINI in the last 72 hours. BNP: No results for input(s): BNP in the last 72 hours. INR: No results for input(s): INR in the last 72 hours. Lipids: No results for input(s): CHOL, LDLDIRECT, TRIG, HDL, AMYLASE, LIPASE in the last 72 hours. Liver: No results for input(s): AST, ALT, ALKPHOS, PROT, LABALBU, BILITOT in the last 72 hours. Invalid input(s): BILDIR  Iron:  No results for input(s): IRONS, FERRITIN in the last 72 hours. Invalid input(s): LABIRONS    Objective:   Vitals: BP (!) 172/73  Pulse 63  Temp 97.7 °F (36.5 °C) (Oral)   Resp 16  Ht 5' 3\" (1.6 m)  Wt 135 lb 5 oz (61.4 kg)  SpO2 98%  BMI 23.97 kg/m2   Wt Readings from Last 3 Encounters:   10/04/17 135 lb 5 oz (61.4 kg)   09/25/17 107 lb (48.5 kg)   09/18/17 115 lb (52.2 kg)      24HR INTAKE/OUTPUT:    Intake/Output Summary (Last 24 hours) at 10/04/17 1706  Last data filed at 10/04/17 1438   Gross per 24 hour   Intake              130 ml   Output              850 ml   Net             -720 ml       Constitutional:  Alert, awake, no apparent distress   Skin:normal   HEENT:Pupils are reactive . Throat is clear   Neck:supple with no jvd  Cardiovascular:  S1, S2 without m/r/g   Respiratory:  Fine crackles with scattered rhonchi  Abdomen: +bs, soft, non tender   Ext: No LE edema  Musculoskeletal:Intact  Neuro:Alert and awake with normal speech      Electronically signed by Edward Flanagan MD on 10/4/2017 at 5:06 PM

## 2017-10-05 NOTE — PROGRESS NOTES
status. Patient with audible respirations and secretions prior to PO trials. Patient unable to clear d/t generalized weakness and non-productive cough. Patient continues to present with intermittent aphonia. Hoarse, breathy vocal quality with max effort to phonate. Recommend close monitoring for completion of FEES/VLS. SHORT TERM GOAL #4:  Goal 4: Pt will complete delayed recall and working memory tasks with 80% accuracy given mod cues to improve overall memory fucntion. INTERVENTIONS:dnt secondary to focus on dysphagia     SHORT TERM GOAL #5:  Goal 5: Pt will complete thought organization and fucntional reasoning tasksw with 80% accuracy to improve independence for ADLs. INTERVENTIONS:dnt secondary to focus on dysphagia          ASSESSMENT:  Assessment: [x]Progressing towards goals          []Not Progressing towards goals       Patient Tolerance of Treatment:   []Tolerated well [x]Tolerated fair []Required rest breaks [x]Fatigued  Plan for Next Session: Patient with plans to transfer to TCU.   Will complete full BSE and cognitive evaluation upon arrival to TCU  Education:  Learner:  [x]Patient          []Significant Other          []Other  Education provided regarding:  [x]Goals and POC   [x]Diet and swallowing precautions    []Home Exercise Program  [x]Progress and/or discharge information  Method of Education:  [x]Discussion          [x]Demonstration          []Handout          []Other  Evaluation of Education:   [x]Verbalized understanding   []Demonstrates without assistance  [x]Demonstrates with assistance  []Needs further instruction     []No evidence of learning                  [x]No family present      Plan: [x]Continue with current plan of care    []Modify current plan of care as follows:    []Discharge patient    Discharge Location:    Services/Supervision Recommended:     [x]Patient continues to require treatment by a licensed therapist to address functional deficits as outlined in the established plan of care.     EVY Stovall 23

## 2017-10-05 NOTE — PROGRESS NOTES
Critical Care consult    Indication: tachypnea with latent wheeze and ronchi    Physical Exam:  VS: reviewed  GEN: minimal distress, alert   HEENT: PERRL, moist oral mucosa  NECK: supple without masses  CV: RR s1s2  LUNGS: decreased air movement, latent wheeze, min-mod ronchi  ABD: s/nt/nd/hypoact bs  EXT: DP/PT 2+, LE edema  NEURO: grossly intact    Labs/ Imaging: reviewed    A/P:    Tachypnea- ml 2/2 to increased secretions and poor airway clearance- recommend intermittent NIPPV, mucomyst tid for 3 doses, percussion and acapella. CXR and ABG pending. Will follow accordingly. Spoke with RT and RN regarding plan. CC time 35 min  Cornell Maharaj MD

## 2017-10-05 NOTE — PROGRESS NOTES
Pt with increased inability to clear airway even after multiple attempts per RN to suction with small catheter in back of mouth for large amount yellow thick secretions but gurgling comes right back after suctioning. Pt is more hoarse with her voice, tachypneic, and labored with breathing pattern. Pulse ox on 1 L N/C at 89% on right finger but despite suctioning doesn't rise until elevated to 4 L N/C after 15 min and 3 suction attempts. Resp rate at 26. RT called for pulmonary toilet and assessment. Dr. Gannon Shall updated per phone at [phone] and requested to call Dr. Leonardo Pyle. Dr. Leonardo Pyle called to see pt at [phone].  [phone]  RT a bedside.

## 2017-10-05 NOTE — PROGRESS NOTES
Physical Medicine & Rehabilitation   Progress Note    Chief Complaint:  SAH after L ICA Stenting. Subjective:  Patient with new pneumonia diagnosis. On antibiotic per nephrology. Patient with weak cough. SLP following for dysphagia. Plan for TCU when medically stable. Rehabilitation:  Physical therapy:   FIMS:  Bed Mobility: Scooting: Minimal assistance (to scoot to edge of surface with extra time given, when scooting back in chair or bed needed max assist )  Transfers: Sit to Stand: Moderate Assistance (mod assist of one and assist of another at her right side )  Stand to sit: Moderate Assistance (of 2 persons poor control on descend and pt didnt reach back she was fatigued from standing activity)  Squat Pivot Transfers: Moderate Assistance (from recliner to w/c and w/c to bed ), Ambulation 1  Surface: level tile  Device: Hand-Held Assist  Assistance: Moderate assistance (of one person and assist of another )  Quality of Gait: flexed posture pt needed tactile cues for posture, pt decreased step length only able to advance feet a few in and lacking heel strike at daniela LEs offered tactile cues for wt shifting and fatigued easy   Distance: a few feet forward,    FIMS:  , PT Equipment Recommendations  Equipment Needed: No, Assessment: pt tolerated session fair, pt cont to be confused and needs encouragement throughout session, pt demonstrated generalized weakness thorughout needing 2 assist to stand and to take steps and needed much assist to propell w/c a distance of 10 feet, pt would benefit from cont therapy    Occupational therapy:   FIMS:   ,  , Assessment: Pt demo need for +2 A for mobility & ADLs at this time d/t weakness, decreased endurance, & decreased cognition. Continued OT recommended to increase Pt's participation in ADLs for increasing indep as PLOF.      Speech therapy:   FIMS:        Review of Systems:  CONSTITUTIONAL:  positive for  fatigue  EYES:  negative  HEENT:  positive for  voice change  RESPIRATORY:  positive for  cough with sputum, weak cough - uses younkers   CARDIOVASCULAR:  negative  GASTROINTESTINAL:  positive for incontinence  GENITOURINARY:  aly  SKIN:  bruising  HEMATOLOGIC/LYMPHATIC:  positive for swelling/edema  MUSCULOSKELETAL:  positive for  muscle weakness  NEUROLOGICAL:  positive for memory problems, speech problems, coordination problems, gait problems and weakness  BEHAVIOR/PSYCH:  negative  10 point system review otherwise negative    Objective:  BP (!) 144/70  Pulse 61  Temp 99.1 °F (37.3 °C) (Rectal)   Resp 24  Ht 5' 3\" (1.6 m)  Wt 135 lb 5 oz (61.4 kg)  SpO2 91%  BMI 23.97 kg/m2  awake  Orientation:   person, place, time  Mood: decreased range  Affect: calm  General appearance: Patient is well nourished, well developed, well groomed and in no acute distress, appearing stated age     Memory:   abnormal - patient unsure of certain things she has at home (lift chair, AD),   Attention/Concentration: normal  Language:  abnormal - poor vocal quality     Cranial Nerves:  cranial nerves II-XII are grossly intact  ROM:  abnormal - bilateral HF and right arm decreased AROM  Tone:  normal  Muscle bulk: within normal limits  Sensory:  Sensory intact     Skin: warm and dry, no rash or erythema and bruising noted through right arm and hand, also in left wrist.   Peripheral vascular: Pulses: Normal upper and lower extremity pulses. Edema: 1+ right hand    Diagnostics:   No results found for this or any previous visit (from the past 24 hour(s)).     Impression:  · Subarachnoid hemorrhage, left frontal parietal occipital 9/28/17  · Left ICA stenosis  ¨ S/p carotid stent 9/26/17  · Aphasia  · Right hemiplegia   · Cognitive deficits   · Dysphagia   · Pneumonia   · Hx ischemic CVAs  · Acute respiratory failure post op stent  · Lumbar pain with radiculopathy   · Alcohol abuse  · Hypertension  · Hyperlipidemia    Plan:  · Continue current therapies  · Monitor swallowing - aspiration

## 2017-10-05 NOTE — PROGRESS NOTES
[address] [address]  INPATIENT PHYSICAL THERAPY  DAILYNOTE  [facility]    Time In: 0900  Time Out: 945  Timed Code Treatment Minutes: 39 Minutes  Minutes: 45          Date: 10/5/2017  Patient Name: Julia Marques,  Gender:  female        MRN: [id number]  : 1935  (80 y.o.)     Referring Practitioner: Dr. Jewel Scott  Diagnosis: carotid stenosis  Additional Pertinent Hx: admit with above diagnosis, ZAIRA, hypotension, left artery carotid setnosis s/p stenting     Past Medical History:   Diagnosis Date    Alcohol abuse     Carotid artery occlusion 2017    History of falling     Hyperlipidemia     Hypertension     Low back pain     Movement disorder     Muscle weakness (generalized)     Osteopenia     Scoliosis     lower lumbar Intervertebral Disc Displacement    Vitamin B-complex deficiency      Past Surgical History:   Procedure Laterality Date    HYSTERECTOMY      SHOULDER SURGERY Bilateral        Restrictions/Precautions:  Restrictions/Precautions: General Precautions, Fall Risk            Position Activity Restriction  Other position/activity restrictions: painful RUE         Subjective:     Subjective: pt up in chair on arrival and agreeable for therapy, pts affect better this date she was smiling at times and she was able to carry on a conversation    Pain:   . Social/Functional:  Type of Home: Facility  Home Layout: One level     Objective:       Transfers  Sit to Stand: Maximum Assistance (from w/c pt had poor awareness of right UE placement and needed hand over hand, and second attempt had her support her UE during transfer for better awareness) pt had completed another sit to stand with max assist and then assist of another for clean up and change of attends she stood for 1 min  Stand to sit: Moderate Assistance (cues to control descend needed hand over hand at right UE on arm rest)  Squat Pivot Transfers:  Moderate Assistance (from bedside chair to w/c and

## 2017-10-05 NOTE — PROGRESS NOTES
Renal Progress Note    Patient - Hilda Mariscal   MRN -  [id number]   Acct # - [de-identified]      - 1935    80 y.o. Admit Date: 2017  Hospital Day: 8  Location: -[facility]-  Date of evaluation -  10/5/2017    Subjective:   CC: stroke, ZAIRA  -170/  Inct urine  Denies sob or cough. 2 lpm  Dysphagia  TCU today    Objective:   VITALS:  BP (!) 171/70  Pulse 61  Temp 97.3 °F (36.3 °C) (Oral)   Resp 22  Ht 5' 3\" (1.6 m)  Wt 135 lb 5 oz (61.4 kg)  SpO2 90%  BMI 23.97 kg/m2   Patient Vitals for the past 24 hrs:   BP Temp Temp src Pulse Resp SpO2   10/05/17 0419 (!) 171/70 97.3 °F (36.3 °C) Oral 61 22 90 %   10/04/17 2353 (!) 168/71 97.9 °F (36.6 °C) Oral 58 20 90 %   10/04/17 2005 (!) 150/67 97.9 °F (36.6 °C) Oral 62 20 93 %   10/04/17 1656 (!) 172/73 97.7 °F (36.5 °C) Oral 63 16 98 %   10/04/17 1151 (!) 182/77 98.2 °F (36.8 °C) Axillary 61 16 99 %   10/04/17 0803 (!) 157/91 99.1 °F (37.3 °C) Axillary 66 20 98 %     2 L/min  Patient Vitals for the past 96 hrs (Last 3 readings):   Weight   10/04/17 0415 135 lb 5 oz (61.4 kg)   10/03/17 0418 125 lb 14.4 oz (57.1 kg)       Intake/Output Summary (Last 24 hours) at 10/05/17 075  Last data filed at 10/05/17 041   Gross per 24 hour   Intake              605 ml   Output              300 ml   Net              305 ml     EXAM:  CONSTITUTIONAL:  No acute distress. Lying comfortable in bed. HEENT:  Head is normocephalic, Extraocular movement intact, Mucus membranes moist. Neck is supple. CARDIOVASCULAR:  S1, S2  regular rate and rhythm. RESPIRATORY: Clear to ausculation bilaterally. Equal breath sounds. No wheezes. No shortness of breath noted at rest. Weak  Non productive cough  ABDOMEN: soft, non tender  NEUROLOGICAL: Patient is alert and oriented to person, place. Pt is attentive. SKIN: no rash, No significant bruises  MUSCULOSKELETAL: Rt hemiparesis   EXTREMITIES: Distal lower extremity temp is warm, no lower extremity edema.    PSYCHIATRIC: mood and affect appropriate. Medications:   Scheduled Meds:   azithromycin  250 mg Intravenous Q24H    sodium chloride flush  10 mL Intravenous Q12H    epoetin  1,000 Units Subcutaneous Once per day on Tue Thu Sat    calcium replacement protocol   Other RX Placeholder    magnesium replacement protocol   Other RX Placeholder    phosphorus replacement protocol   Other RX Placeholder    chlorhexidine  15 mL Mouth/Throat 4x Daily    famotidine (PEPCID) injection  10 mg Intravenous BID    aspirin  81 mg Oral Once    clopidogrel  75 mg Oral Once    sodium chloride  250 mL Intravenous Once       Labs:     Recent Labs      10/02/17   1327  10/03/17   0512  10/03/17   2017  10/04/17   0448   NA  144  144   --   144   K  3.8  3.4*  3.7  3.6   CL  99  104   --   103   CO2  32  32   --   30   BUN  37*  38*   --   39*   CREATININE  1.2  1.2   --   1.0   LABGLOM  43*  43*   --   53*   GLUCOSE  90  83   --   72   MG   --    --    --   1.9   CALCIUM  8.0*  7.5*   --   8.1*   CAION   --    --   1.10*   --      Recent Labs      10/03/17   0512   WBC  11.6*   RBC  3.25*   HGB  9.8*   HCT  29.3*   PLT  101*      CXR 10/4   Impression:    1. There is new consolidation suggesting atelectasis and/or infiltrates within the left mid and lower chest and a slightly worsened small left pleural effusion. There is a new small right pleural effusion with additional hazy opacity within the right   lower chest suggesting layering pleural fluid. There is no pulmonary vascular congestion. ASSESSMENT:  1. Acute Kidney Injury S/P CVVH, Remove Hemodialysis catheter. Ok to transfer to Rehab from renal aspect  2. Subrarchnoid hemorrhage, Lt parietal occipital 9/28/17  3. Lt carotid stensosis s/p stent 9/26/17  4. Rt hemiplegia  5. Dysphagia  6. Pneumonia, Needs good pulm hygiene  7. Hypocalcemia  Replace per protocol  8.  Anemia, Hgb 9.8., Stop epogen    Meds and Labs reviewed  Principal Problem:    Cerebrovascular accident (CVA) due to stenosis of

## 2017-10-05 NOTE — PLAN OF CARE
Problem: Falls - Risk of  Goal: Absence of falls  Outcome: Ongoing  Patient has been free from falls at this time. Gait is weak and unsteady. Patient ambulates with 1-2 assistance and walker. Problem: NEUROLOGICAL DEFICIT  Goal: Neurological status is stable or improving  Outcome: Ongoing  Patient disoriented to situation but is able to follow commands most of the time. Problem: HEMODYNAMIC STATUS  Goal: Patient has stable vital signs and fluid balance  Outcome: Ongoing        10/04/17 1656   Vital Signs   Temp 97.7 °F (36.5 °C)   Temp Source Oral   Pulse 63   Heart Rate Source Monitor   Resp 16   BP (!) 172/73   BP Location Right Arm   BP Upper/Lower Upper   MAP (mmHg) 105   Level of Consciousness 0   MEWS Score 1   Patient Currently in Pain Denies         Problem: NUTRITION  Goal: Nutritional status is improving  Outcome: Ongoing  Patient states she does not like pureed foods or thickened liquids. Patient is consuming 1-25% of meals at this time. Problem: ASPIRATION PRECAUTIONS  Goal: Patients risk of aspiration is minimized  Outcome: Ongoing  Patient's coughing up yellow, white thick sputum. Lung sounds diminished, fine crackles in bases. Comments:   Care plan reviewed with patient and family. Patient and family verbalize understanding of the plan of care and contribute to goal setting.

## 2017-10-05 NOTE — PROGRESS NOTES
[address]  INPATIENT OCCUPATIONAL THERAPY  [facility] 4A  DAILY NOTE    Time:  Time In: 805  Time Out: 9711  Timed Code Treatment Minutes: 45 Minutes  Minutes: 38          Date: 10/5/2017  Patient Name: Pinky Ashley,   Gender: female      Room: [facility]-  MRN: [id number]  : 1935  (80 y.o.)  Referring Practitioner: Dr. Olivia Graham  Diagnosis: carotid stensosis  Diagnosis: Carotid stenosis with cerebral infarction less than 8 weeks ago  Additional Pertinent Hx: Per nurse Pt came in for scheduled stenting. Per Dr. Sherry Hook note: S/p carotid stenting  on 60 complicated by post-operative radial artery hemorrhage and hypotension ultimately resulting in ARF. extubated on 10/1/17    Restrictions/Precautions:  Restrictions/Precautions: General Precautions, Fall Risk            Position Activity Restriction  Other position/activity restrictions: painful RUE         Past Medical History:   Diagnosis Date    Alcohol abuse     Carotid artery occlusion 2017    History of falling     Hyperlipidemia     Hypertension     Low back pain     Movement disorder     Muscle weakness (generalized)     Osteopenia     Scoliosis     lower lumbar Intervertebral Disc Displacement    Vitamin B-complex deficiency      Past Surgical History:   Procedure Laterality Date    HYSTERECTOMY      SHOULDER SURGERY Bilateral            Subjective       Subjective: Pt asleep in bed when arrived. Pt agreeable to get up to chair with therapy.  Pt slightly confused this am and RN okayed therapy session       Pain:  Pain Assessment  Patient Currently in Pain: Denies       Objective  Overall Cognitive Status: Exceptions  Cognition Comment: slow processing, disoriented, decreased problem solving and awareness, decreased insight    ADL  LE Dressing: Dependent/Total (to don brief and slipper socks )  Toileting: Dependent/Total (pt incontinent of bowel with RN present to assist )          Bed mobility  Rolling to Left: Moderate assistance  Rolling to Right: Moderate assistance  Supine to Sit: Maximum assistance (HOB slightly elevated and cues for technique )    Transfers  Stand Pivot Transfers: Moderate assistance (x1 from EOB to Recliner )  Sit to stand: Moderate assistance (x1 event from EOB )  Stand to sit: Minimal assistance (x1 from EOB to recliner )       Balance  Sitting Balance: Minimal assistance (intally progressed toCGA . Pt did put little weigth through RUE on bed for WBing while sitting EOB for 5 min. )  Standing Balance: Maximum assistance (from EOB with pt initally holding on to R wrist for RUE suppport with L hand with cues for posture and stand upright. Sidney Barthel )     Time: x1 min x2 events   Activity: standing at EOB for posture and  static standing balance      Functional Mobility  Functional - Mobility Device: No device (pt holding onto R wrist with L hand for supoort of R UE )  Activity: Other  Assist Level: Moderate assistance (x1 EOB to recliner of standing pivot transfer)  Functional Mobility Comments: pt able to shuffe feet during stand pivot, mod difficulty noted very unsteady from EOB to recliner       Type of ROM/Therapeutic Exercise: AROM  Comment: RUE shoulder flexion about 60 degrees x5 reps with max cues and fatiguing quickly. retrograde massage to R hand complete due to edema noted. RUE propped up on pillow at end of session seated in recliner        Activity Tolerance:  Activity Tolerance: Patient Tolerated treatment well;Patient limited by fatigue  Activity Tolerance: pt was able to follow simple 1 stpe commands with repetition 75% of time.      Assessment:     Performance deficits / Impairments: Decreased functional mobility , Decreased ADL status, Decreased endurance, Decreased ROM, Decreased strength, Decreased safe awareness, Decreased cognition, Decreased balance  Prognosis: Fair  Discharge Recommendations: Continue to assess pending progress    Patient Education:  Patient Education: transfer safety and RUE postioning when transfer and edema control of pillows and retrograde massage   Barriers to Learning: decreased cognition    Equipment Recommendations:  Equipment Needed: No  Other: Continue to assess    Safety:  Safety Devices in place: Yes  Type of devices: Call light within reach, Gait belt, Left in chair, Chair alarm in place, Nurse notified, Patient at risk for falls    Plan:  Times per week: 5x  Current Treatment Recommendations: Balance Training, Functional Mobility Training, Endurance Training, Self-Care / ADL, Safety Education & Training, Patient/Caregiver Education & Training    Goals:  Patient goals : Pt did not state    Short term goals  Time Frame for Short term goals: 1 week  Short term goal 1: Complete various stand-pivot t/f to [facility] or bedside chair with 0>mod A x 1  Short term goal 2: Complete 1 min standing with min A & min vcs for posture  Short term goal 3: Follow 75% of simple commands to increase participation in ADL tasks  Short term goal 4: Complete BUE gentle AROM exercises to increase AROM BUE for ease of grooming  Short term goal 5:  Tolerate further assessment of ambulation when appropriate  Long term goals  Time Frame for Long term goals : No LTG set d/t short ELOS

## 2017-10-06 NOTE — PROGRESS NOTES
Nutrition Assessment    Type and Reason for Visit: Reassess (staff requesting TF recommendations)    Nutrition Recommendations: If TF's initiated would recommend Jevity 1.5 at 25 ml/hr, increase 10 ml/hr every 4 hrs as tolerated to a goal of 45 ml/hr (1620 kcals/69 grams protein). Would recommend 150 ml free water flush every 8 hrs (when IVF's off) unless specified otherwise by MD.  Diet recommendations per SLP. Malnutrition Assessment:  · Malnutrition Status: At risk for malnutrition    Nutrition Diagnosis:   · Problem: Inadequate oral intake  · Etiology: related to Cognitive or neurological impairment, Difficulty swallowing     Signs and symptoms:  as evidenced by Swallow study results, NPO status due to medical condition    Nutrition Assessment:  · Subjective Assessment: Spoke with Shakila SLP-evaluated pt. at bedside this morning-recommending NPO. Nhung Thompson CNP discussed feeding tube with pt. and spouse, and they are going to discusse. hNung requesting TF recommendations. Pt. currently sleeping in bedside chair and no family in her room. Plan TCU when medicallyl stable. · Wound Type: None  · Current Nutrition Therapies:  · Oral Diet Orders: NPO   · Anthropometric Measures:  · Ht: 5' 3\" (160 cm)   · Current Body Wt: 135 lb 5 oz (61.4 kg) (10/4/17 +2 RUE, trace LUE, +3-4 LE edema )  · Admission Body Wt: 107 lb (48.5 kg) (9/27 stated)  · Usual Body Wt:  (~ 114# per pt.   Note 124# 11/10/14 per EMR.)  · Ideal Body Wt: 115 lb (52.2 kg),   · BMI Classification: BMI 18.5 - 24.9 Normal Weight  · Comparative Standards (Estimated Nutrition Needs):  · Estimated Daily Total Kcal: 5720-2332 (30-32/kgm admit wt. of 49kgm)  · Estimated Daily Protein (g): 58-68 grams (1.2-1.4/kgm admit wt. of 49kgm)    Estimated Intake vs Estimated Needs: Intake Less Than Needs    Nutrition Risk Level: High    Nutrition Interventions:   Continue NPO (Awaiting pt/family decision re: TF start.)  Continued Inpatient Monitoring, Education not appropriate at this time    Nutrition Evaluation:   · Evaluation: Goals set   · Goals: Pt. will recieve adequate nutrition in 1-4 days. · Monitoring: NPO Status, Ascites/Edema, Mental Status/Confusion, Weight, Pertinent Labs, Chewing/Swallowing    See Adult Nutrition Doc Flowsheet for more detail.      Electronically signed by Sheela Goetz RD, LD on 10/6/17 at 11:49 AM    Contact Number: [phone]

## 2017-10-06 NOTE — PROGRESS NOTES
Physical Medicine & Rehabilitation   Progress Note    Chief Complaint:  SAH after L ICA Stenting. Subjective:  Patient sitting up in chair. Patient continues to have weak cough with difficulty bringing up sputum. Patient able to bring a little up when encourage to continue to cough. Patient now NPO. Discussed ongoing nutrition source of NG vs PEG.  and patient understanding with need for nutrition, water, and medication access. Understanding with risk of aspiration/pneumonia with eating/drinking when unable to safely swallow.  and patient to discuss options. Rehabilitation:  Physical therapy:   FIMS:  Bed Mobility: Scooting: Minimal assistance (to scoot to edge of surface with extra time given, when scooting back in chair or bed needed max assist )  Transfers: Sit to Stand: Maximum Assistance (from w/c pt had poor awareness of right UE placement and needed hand over hand, and second attempt had her support her UE during transfer for better awareness)  Stand to sit: Moderate Assistance (cues to control descend needed hand over hand at right UE on arm rest)  Squat Pivot Transfers: Moderate Assistance (from bedside chair to w/c and w/c to bedside chair, she needed min assist to scoot to edge of chair ), Ambulation 1  Surface: level tile  Device: Hand-Held Assist  Assistance:  Moderate assistance (of one person and assist of another )  Quality of Gait: flexed posture pt needed tactile cues for posture, pt decreased step length only able to advance feet a few in and lacking heel strike at daniela LEs offered tactile cues for wt shifting and fatigued easy   Distance: a few feet forward,    FIMS:  , PT Equipment Recommendations  Equipment Needed: No, Assessment: pt tolerated session fair, she did engage in session more this date however cont to have generalized weakness throughout rosa at right UE, pt able to stand and needed much assist for wt shifting and pre gait activity, pt did participate more in Ht 5' 3\" (1.6 m)  Wt 135 lb 5 oz (61.4 kg)  SpO2 93%  BMI 23.97 kg/m2  awake  Orientation:   person, place, time  Mood: decreased range  Affect: calm  General appearance: Patient is well nourished, well developed, well groomed and in no acute distress, appearing stated age     Memory:   abnormal - patient unsure of certain things she has at home (lift chair, AD),   Attention/Concentration: normal  Language:  abnormal - poor vocal quality     ROM:  abnormal - bilateral HF and right arm decreased AROM  Tone:  normal  Muscle bulk: within normal limits  Sensory:  Sensory intact     Skin: warm and dry, no rash or erythema and bruising noted through right arm and hand, also in left wrist.   Peripheral vascular: Pulses: Normal upper and lower extremity pulses.   Edema: 1+ right hand    Diagnostics:   Recent Results (from the past 24 hour(s))   CBC    Collection Time: 10/05/17  9:57 AM   Result Value Ref Range    WBC 13.5 (H) 4.8 - 10.8 thou/mm3    RBC 3.40 (L) 4.20 - 5.40 mill/mm3    Hemoglobin 10.4 (L) 12.0 - 16.0 gm/dl    Hematocrit 31.5 (L) 37.0 - 47.0 %    MCV 92.7 81.0 - 99.0 fL    MCH 30.5 27.0 - 31.0 pg    MCHC 32.9 (L) 33.0 - 37.0 gm/dl    RDW 14.8 (H) 11.5 - 14.5 %    Platelets 743 265 - 578 thou/mm3    MPV 8.4 7.4 - 10.4 mcm   Basic Metabolic Panel    Collection Time: 10/05/17  9:57 AM   Result Value Ref Range    Sodium 145 135 - 145 meq/L    Potassium 3.4 (L) 3.5 - 5.2 meq/L    Chloride 108 98 - 111 meq/L    CO2 28 23 - 33 meq/L    Glucose 102 70 - 108 mg/dL    BUN 34 (H) 7 - 22 mg/dL    CREATININE 0.8 0.4 - 1.2 mg/dL    Calcium 7.9 (L) 8.5 - 10.5 mg/dL   Anion Gap    Collection Time: 10/05/17  9:57 AM   Result Value Ref Range    Anion Gap 9.0 8.0 - 16.0 meq/L   Glomerular Filtration Rate, Estimated    Collection Time: 10/05/17  9:57 AM   Result Value Ref Range    Est, Glom Filt Rate 69 (A) ml/min/1.73m2   Blood Gas, Arterial    Collection Time: 10/05/17  4:55 PM   Result Value Ref Range    pH, Blood Gas ml/min/1.73m2       Impression:  · Subarachnoid hemorrhage, left frontal parietal occipital 9/28/17  · Left ICA stenosis  ¨ S/p carotid stent 9/26/17  · Aphasia  · Right hemiplegia   · Cognitive deficits   · Dysphagia   · Pneumonia   · Hx ischemic CVAs  · Acute respiratory failure post op stent  · Lumbar pain with radiculopathy   · Alcohol abuse  · Hypertension  · Hyperlipidemia    Plan:  · Continue current therapies  · Patient now NPO  · Discussed feeding options with  and patient. They will discuss further. PEG vs NG  · Will plan TCU when stable.  Re-eval next week    Missed Therapy Time:  · None    Nhung Thompson, CNP

## 2017-10-06 NOTE — PLAN OF CARE
Problem: Nutrition  Goal: Optimal nutrition therapy  Outcome: Ongoing  Patient on Dysphagia 1-Honey thick diet. Speech therapy in today. States they are concerned about her increase difficulty in swallowing. Did repeat Chest xray. Stable and minor changes from previous. Spoke with Dr. Veronica Caro states he wants patient to remain on Dysphagia 1 honey thick. Patient refusing oral intake at this time. Will discuss further with  In the AM. IV fluids infusing at 20mls/hr.

## 2017-10-06 NOTE — PROGRESS NOTES
[address] [place] [address]  INPATIENT SPEECH THERAPY  [facility]A    TIME              [x]Daily Note  []Progress Note  []Discharge Note    Date: 10/6/2017  Patient Name: Carles Holstein        MRN: [id number]    : 1935  (80 y.o.)  Gender: female   Primary Provider: Cris Ramirez MD  Admitting Diagnosis:  Carotid Stenosis  Secondary Diagnosis: Dysphagia, Cognitive deficits, Dysphonia   Precautions: High Risk Aspiration  Swallowing Status/Diet: Dysphagia I with honey thick liquids  Swallowing Strategies: Direct 1:1 feeding assistance  DATE of last BSE: 10/03    Subjective: Patient seen at bedside. Significant decline in respiratory status and alertness over past 48 hours. TCU transfer held due to this decline. Pt requiring oral suctioning as she is no longer managing oral secretions and unable to elicit functional cough/clearence of congestion. SHORT TERM GOAL #1:  Goal 1: Patient will tolerate dysphagia I with honey thick liquids without s/s of aspiration in order to safely maintain adequate hydration and nutrition. INTERVENTIONS:  Pt tolerated 3 tsps of oral intake of the above diet over the past 24 hours. Pt refusing additional oral intake as well as refusal for oral suctioning Per previous SLP note from 10/5 with recommendations for NPO due to medical decline and high aspiration risk. Per chart review and discussion with RN,  Dr James Brooks with verbal order for pt to remain on puree and honey thick diet. Attempted trial of honey thick liquids by spoon with this SLP. Pt with adamant refusal of any oral intake requirng max encouragement to take in 1 small tsp of honey thick juice. Decreased labial seal and oral bolus control noted. Delayed pharyngeal swallow trigger with poor laryngeal elevation noted. Weak throat clear following swallow with suspected penetration/aspiratiotn. No further trials as pt with significant fatigue and unable to tolerate.   Pt also remains with severe []Significant Other          []Other  Education provided regarding:  [x]Goals and POC   [x]Diet and swallowing precautions    []Home Exercise Program  [x]Progress and/or discharge information  Method of Education:  [x]Discussion          [x]Demonstration          []Handout          []Other  Evaluation of Education:   []Verbalized understanding   []Demonstrates without assistance  []Demonstrates with assistance  []Needs further instruction     [x]No evidence of learning                  [x]No family present      Plan: [x]Continue with current plan of care    []Modify current plan of care as follows:    []Discharge patient    Discharge Location:    Services/Supervision Recommended:     [x]Patient continues to require treatment by a licensed therapist to address functional deficits as outlined in the established plan of care.   [facility] MTUNG CORRALESUJoseYANASH/[id number]

## 2017-10-06 NOTE — PROGRESS NOTES
144.5 11/09/2014    LIPASE 150.0 11/09/2014     Lab Results   Component Value Date    TROPONINI 0.007 07/06/2013      No results found for: LABA1C    No results found for: CHARCSF, CULTURE  No results found for: PHENYTOIN, CARBTOT, PHENOBARB, VALPROATE  No results found for: [facility]    Lab Results   Component Value Date    NITRU NEGATIVE 09/26/2017    COLORU YELLOW 09/26/2017    PHUR 6.0 09/26/2017    LABCAST NONE SEEN 09/26/2017    LABCAST NONE SEEN 09/26/2017    WBCUA 2-4 09/26/2017    RBCUA 0-2 09/26/2017    YEAST NONE SEEN 09/26/2017    BACTERIA NONE 09/26/2017    SPECGRAV >1.030 09/26/2017    LEUKOCYTESUR NEGATIVE 09/26/2017    LEUKOCYTESUR TRACE 11/09/2014    UROBILINOGEN 0.2 09/26/2017    BILIRUBINUR NEGATIVE 09/26/2017    BLOODU NEGATIVE 09/26/2017    KETUA NEGATIVE 09/26/2017

## 2017-10-06 NOTE — PROGRESS NOTES
Component Value Date    APTT 32.5 09/30/2017     Lab Results   Component Value Date    ALKPHOS 104 09/30/2017    ALT 42 09/30/2017    AST 80 09/30/2017    BILITOT 0.4 09/30/2017    BILIDIR <0.2 09/30/2017    LABALBU 2.0 09/30/2017    LIPASE 144.5 11/09/2014    LIPASE 150.0 11/09/2014     Lab Results   Component Value Date    TROPONINI 0.007 07/06/2013      No results found for: LABA1C    No results found for: CHARCSF, CULTURE  No results found for: PHENYTOIN, CARBTOT, PHENOBARB, VALPROATE  No results found for: [facility]    Lab Results   Component Value Date    NITRU NEGATIVE 10/05/2017    COLORU YELLOW 10/05/2017    PHUR 6.5 10/05/2017    LABCAST NONE SEEN 10/05/2017    LABCAST NONE SEEN 10/05/2017    WBCUA 15-25 10/05/2017    RBCUA 0-2 10/05/2017    YEAST NONE SEEN 10/05/2017    BACTERIA NONE 10/05/2017    SPECGRAV 1.022 10/05/2017    LEUKOCYTESUR SMALL 10/05/2017    LEUKOCYTESUR TRACE 11/09/2014    UROBILINOGEN 1.0 10/05/2017    BILIRUBINUR SMALL 10/05/2017    BLOODU MODERATE 10/05/2017    KETUA 15 10/05/2017

## 2017-10-06 NOTE — PROGRESS NOTES
replacement protocol   Other RX Placeholder    magnesium replacement protocol   Other RX Placeholder    phosphorus replacement protocol   Other RX Placeholder    chlorhexidine  15 mL Mouth/Throat 4x Daily    aspirin  81 mg Oral Once    clopidogrel  75 mg Oral Once    sodium chloride  250 mL Intravenous Once     Continuous Infusions:   dextrose 50 mL/hr at 10/06/17 0757    DOBUTamine Stopped (09/30/17 1902)    sodium chloride      propofol Stopped (09/27/17 1045)       CBC:   Recent Labs      10/05/17   0957   WBC  13.5*   HGB  10.4*   PLT  181     CMP:  Recent Labs      10/04/17   0448  10/05/17   0957  10/05/17   2108  10/06/17   0439   NA  144  145   --   148*   K  3.6  3.4*  3.7  3.5   CL  103  108   --   109   CO2  30  28   --   27   BUN  39*  34*   --   32*   CREATININE  1.0  0.8   --   0.8   GLUCOSE  72  102   --   116*   CALCIUM  8.1*  7.9*   --   7.6*   LABGLOM  53*  69*   --   69*     Troponin: No results for input(s): TROPONINI in the last 72 hours. BNP: No results for input(s): BNP in the last 72 hours. INR: No results for input(s): INR in the last 72 hours. Lipids: No results for input(s): CHOL, LDLDIRECT, TRIG, HDL, AMYLASE, LIPASE in the last 72 hours. Liver: No results for input(s): AST, ALT, ALKPHOS, PROT, LABALBU, BILITOT in the last 72 hours. Invalid input(s): BILDIR  Iron:  No results for input(s): IRONS, FERRITIN in the last 72 hours.     Invalid input(s): LABIRONS    Objective:   Vitals: BP (!) 163/71  Pulse 70  Temp 97.5 °F (36.4 °C) (Oral)   Resp 18  Ht 5' 3\" (1.6 m)  Wt 135 lb 5 oz (61.4 kg)  SpO2 95%  BMI 23.97 kg/m2   Wt Readings from Last 3 Encounters:   10/04/17 135 lb 5 oz (61.4 kg)   09/25/17 107 lb (48.5 kg)   09/18/17 115 lb (52.2 kg)      24HR INTAKE/OUTPUT:    Intake/Output Summary (Last 24 hours) at 10/06/17 1123  Last data filed at 10/06/17 0511   Gross per 24 hour   Intake              645 ml   Output                0 ml   Net              645 ml

## 2017-10-06 NOTE — PLAN OF CARE
Problem: Nutrition  Goal: Optimal nutrition therapy  Outcome: Ongoing  Nutrition Problem: Inadequate oral intake  Intervention: Food and/or Nutrient Delivery: Continue NPO (Awaiting pt/family decision re: TF start.)  Nutritional Goals: Pt. will recieve adequate nutrition in 1-4 days.

## 2017-10-06 NOTE — PROGRESS NOTES
)  Scooting: Moderate assistance (Toward EOB)    Transfers  Squat Pivot Transfers: Maximum Assistance (From EOB to BS chair. Pt. with poor R UE awareness during transfer)         Balance  Comments: Pt. sat EOB approximately 8 minutes in prep for transfer while PTA donned gait belt and adjusted gown. Pt requires VCs to correct posture and maintain midline as pt had tendancy to lean to R side. Pt. reports slight dizziness upon sitting. Pt. unsteady requiring assist ranging from CGA-max A for balance. Exercises:  Exercises  Comments: Pt. performed supine B LE ther ex 10x each consisting of ankle pumps, short arc quads, and heel slides. Pt. lacking participation with ther ex this date and required many cues to complete. PROM performed dorsi/plantar flexion, hip abd/add, and heel slides, also provided B heelcord stretch 3x30\" each all to increase strength and maintain LE ROM for improved functional mobility. Activity Tolerance:  Activity Tolerance: Patient limited by fatigue;Patient limited by endurance; Patient limited by cognitive status    Assessment: Body structures, Functions, Activity limitations: Decreased functional mobility , Decreased balance, Decreased ROM, Decreased strength, Decreased safe awareness, Decreased cognition, Decreased endurance, Decreased coordination  Assessment: Pt. toleated session fair. Pt. requiring many cues to engage in session this date. Pt. unsteady while seated EOB and during transfer requiring physical assistance for safety. Pt. also with decreased safety awareness of R UE during transfer. Pt. would benefit from continued skilled PT to increase strength and improve functional mobility.    Prognosis: Good  REQUIRES PT FOLLOW UP: Yes  Discharge Recommendations: Continue to assess pending progress, Patient would benefit from continued therapy after discharge, Subacute/Skilled Nursing Facility    Patient Education:  Patient Education: POC, Ther ex, Transfers, Bed

## 2017-10-07 NOTE — FLOWSHEET NOTE
10/06/17 2136   Encounter Summary   Services provided to: Patient   Referral/Consult From: [facility] Family members   Continue Visiting Yes  (10/6/17/ continue support)   Complexity of Encounter Moderate   Length of Encounter 15 minutes   Routine   Type Follow up   Assessment Coping   Intervention Prayer;Nurtured hope   Outcome Expressed gratitude   Spiritual/Yazidism   Type Spiritual support     During the encounter patient was lying in bed awake. Patient had oxygen horse on helping her with her breathing. During my conversation with patient, she struggled to verbally communicate with me. Patient was able to understand what I was saying, but was having difficulty responding as she would like. Patient said she had her family come down to see her today. Patient also said that she was doing her best dealing with her illness. I offered words of encouragement and hope for patient and family. I also offered prayer for healing for patient as well. Will follow up for continue spiritual and emotional support.

## 2017-10-07 NOTE — PROGRESS NOTES
Pt refusing to be suctioned at this time. This RN attempted to place yaunker in pt's mouth and pt stated, \"No, no, get away! \" This RN explained importance of suctioning to pt to help prevent aspiration pneumonia and explained that pt has a great deal of secretions at base of throat and suctioning will help the patients labored breathing. Pt then stated, \"okay, fine\". This RN then explained NT suction to pt and asked if pt would allow this RN to do so. Pt stated, \"yeah, sure, okay\". This RN then attempted to NT suction pt and pt yelled, \"No, No, just get away and leave me alone. Stop and shut up! \" Call light given to patient. HOB elevated >30 degrees. Will continue to monitor.

## 2017-10-08 NOTE — PLAN OF CARE
Problem: RESPIRATORY  Goal: Clear lung sounds  Outcome: Ongoing  Will continue with treatments to help improve aeration t/o lungs. Attempted to do manual CPT with pt. Pt wincing in pain after first 10 seconds; stating she cannot tolerate CPT. Attempted to do acapella with pt. Pt refusing to put acapella in mouth. Pt educated for 5 mins on purpose and technique of acapella. Pt still remains uncooperative with acapella.

## 2017-10-08 NOTE — PLAN OF CARE
Problem: Falls - Risk of  Goal: Absence of falls  Outcome: Ongoing  Patient aware of safety restrictions. Call light in reach, bed lowest position, wheels locked, and correct ID band in place. Environment remains clear of fall hazards. Reviewed safety measures with patient/family. Comments:   Problem: Discharge Planning:  Goal: Discharged to appropriate level of care  Discharged to appropriate level of care    Outcome: Ongoing  Waiting for TCU once stable and peg placed.     Problem: Pain:  Goal: Pain level will decrease  Pain level will decrease    Outcome: Ongoing  Denies pain at this time. Facial grimacing when turning. Refusing pain meds.     Problem: Bleeding:  Goal: Will show no signs and symptoms of excessive bleeding  Will show no signs and symptoms of excessive bleeding    Outcome: Ongoing  No s/s of bleeding. VS stable. Urine remains yellow/clear. Voiding q8hrs     Problem: Nutrition  Goal: Optimal nutrition therapy  Outcome: Not Met This Shift  Patient NPO due to failed swallow and evidence of aspiration.      Problem: Aspiration:  Goal: Absence of aspiration  Absence of aspiration    Outcome: Not Met This Shift  Patient NPO due to suspected aspiration and worsening chest xray. Rhonchi LS in upper airway.     Problem: NEUROLOGICAL DEFICIT  Goal: Neurological status is stable or improving  Outcome: Ongoing  Oriented to person and time and place. Remains right upper extremity weak. Generalized weakness. Speech clear.      Problem: HEMODYNAMIC STATUS  Goal: Patient has stable vital signs and fluid balance  Outcome: Ongoing  Vitals:    10/08/17 0901   BP: (!) 116/53   Pulse: 72   Resp: 24   Temp: 98.2 °F (36.8 °C)   SpO2: 90%     On 5LNC. Increased need for supplemental oxygen to maintain oxygen sat above 90%. LS remain rhonchi upper airway and crackles in bases        Comments: Patient is unable to participate in care planning. Family has been updated and understands.

## 2017-10-08 NOTE — PROGRESS NOTES
RN spoke with son Bob Dominguez) and daughter in law. They are willing to go along with what patient's wishes are for care. However, until they hear it from her mouth, they wish to continue with current plan of care. Per son, patient and  are planning to have discussion tomorrow morning prior to PEG placement.

## 2017-10-08 NOTE — PROGRESS NOTES
Spoke with RN regarding NT suctioning. RN and family states that pt is non tolerant and will not allow this to be done. RCP informed family if she cannot have a productive cough, which she has not been having, and she doesn't not allow us to do bronchial hygiene pts status could decline.

## 2017-10-09 NOTE — PROGRESS NOTES
Pt seen and examined. She is sleeping. Per nursing staff, refusing all suctioning. No changes. PEG tomorrow. Please feel free to call with any questions. Waqas Kirshnamurthy M.D., J.D.   Vascular Neurology and Endovascular Surgical Neuroradiology  Cell Number: [phone]

## 2017-10-09 NOTE — PROGRESS NOTES
by compression                                                    - Hgb and Hct adequately maintained, PRBC empirically given last night  - ASCVD                           - Rt LE . Left LE disease with reduced peripheral pulses                           AM U/S:                             - Post-carotid stent \"arrest\"                           - Bradycardia, hypotension - poorly responsive to IV Dopamine, epinephrine, brief CPR given                           - Question of carotid sinus hypersensitivity post-stent  - Elevated alkaline phosphatase                           - Known gallstones by prior U/S 2014, other etiology possible            - Improved over time  - Hypoactive delirium  - Pulmonary atelectasis with hypoxemia    Multifactorial   - Retained secretions, weak cough efforts   - Refusal of tracheal suctioning   - Dysphagia with elevated aspiration risk   - Cannot R/O underlying lung disease, but none defined  - Volume overload state with pleural effusions  - Hypoxemia with above    Called to see pt and this done with Dr Sunshine Lino in attendance. She has been more dyspneic through the day. Per RRT staff, pt refusing tracheal suction efforts. Examination  VS as above  General - Lethargic but responsive to voice and attempts to cooperate with examination  Cor - Ausc with RRR, S1 & S2 dustant and muffled by resp noise w/o audible murmur, rub or gallop. No JVD seen ~45 degrees head elevation  Resp - (+) mild-moderate use of insp accessory muscles at rest. Cough efforts fair but not effective in expectorating secretions. Lungs with scattered bilateral rhonchi and fair breath sounds through right lung. Left lung has marked reduction in air entry throughout with only transient improvement after cough. Numerous coarse rhonchi noted w/o clearing after cough  Extrem - 2+ forearm pitting edema bilateral UE, trace thighs, none distal LE.      Scheduled Meds:   ipratropium-albuterol  1 ampule Inhalation Once   

## 2017-10-09 NOTE — PROGRESS NOTES
Pt demo need for +2 A for mobility & ADLs at this time d/t weakness, decreased endurance, & decreased cognition. Continued OT recommended to increase Pt's participation in ADLs for increasing indep as PLOF. Speech therapy:    Review of Systems:  CONSTITUTIONAL:  positive for  fatigue  EYES:  negative  HEENT:  positive for  voice change  RESPIRATORY:  positive for  cough with sputum, weak cough   CARDIOVASCULAR:  negative  GASTROINTESTINAL:  positive for incontinence  GENITOURINARY:  aly  SKIN:  bruising  HEMATOLOGIC/LYMPHATIC:  positive for swelling/edema  MUSCULOSKELETAL:  positive for  muscle weakness  NEUROLOGICAL:  positive for memory problems, speech problems, coordination problems, gait problems and weakness  BEHAVIOR/PSYCH:  negative  10 point system review otherwise negative    Objective:  BP (!) 147/70   Pulse 80   Temp 97.9 °F (36.6 °C) (Axillary)   Resp 24   Ht 5' 3\" (1.6 m)   Wt 125 lb 6.4 oz (56.9 kg)   SpO2 98%   BMI 22.21 kg/m²   Lethargy   Orientation:   person  Mood: decreased range  Affect: calm  General appearance: Patient is well nourished, well developed, well groomed and in no acute distress, appearing stated age     Memory:   abnormal - unable to fully assess today  Attention/Concentration: poor   Language:  abnormal - poor vocal quality     Tone:  normal  Muscle bulk: within normal limits  Sensory:  Sensory intact     Skin: warm and dry, no rash or erythema and bruising noted through right arm and hand, also in left wrist.   Peripheral vascular: Pulses: Normal upper and lower extremity pulses.   Edema: 1+ right hand    Diagnostics:   Recent Results (from the past 24 hour(s))   Calcium, Ionized    Collection Time: 10/08/17 10:53 PM   Result Value Ref Range    Calcium, Ion 1.04 (L) 1.12 - 1.32 mmol/L       Impression:  · Subarachnoid hemorrhage, left frontal parietal occipital 9/28/17  · Left ICA stenosis  ¨ S/p carotid stent 9/26/17  · Aphasia  · Right hemiplegia   · Cognitive

## 2017-10-09 NOTE — PROGRESS NOTES
I was in pts room after giving pt a breathing tx. Dr Ryan Wright in to assess patients breathing status. Pt has refused nt sx and pd&p. Dr Ryan Wright putting in respiratory orders. Family in room discussing their indecisiveness on patients care and \"how far to go\". After asking my opinion, I suggested they talk with palliative care. ..they can help answer questions family may have on medical and care and possible outcomes of decisions. I stopped telling them about it at dr Dionisio Brothers request.  Dr Deloris Soares states he does not agree with palliative care.

## 2017-10-09 NOTE — PROGRESS NOTES
[facility] THERAPY MISSED TREATMENT NOTE  [facility] NEUROSCIENCES 4A      Date: 10/9/2017  Patient Name: Aldo Montanez        MRN: [id number]    : 1935  (80 y.o.)    REASON FOR MISSED TREATMENT:    Pt returned from peg tube placement. Remains lethargic from sedation. Will check back 10/10 for therapy.     [facility] EVY JAVIER-[id number]

## 2017-10-09 NOTE — PROGRESS NOTES
[address] [address]  Sedation/Analgesia History & Physical    Pt Name: Abram Lindsey  MRN: [id number]  YOB: 1935  Provider Performing Procedure: Panchito Alarcon MD  Primary Care Physician: Floyd Mantilla MD    PRE-PROCEDURE   DNR-CCA/DNR-CC []Yes [x]No  Brief History/Pre-Procedure Diagnosis: stroke, does not swallow          MEDICAL HISTORY  []CAD/Valve  []Liver Disease  []Lung Disease []Diabetes  []Hypertension []Renal Disease  []Additional information:       has a past medical history of Alcohol abuse; Carotid artery occlusion; History of falling; Hyperlipidemia; Hypertension; Low back pain; Movement disorder; Muscle weakness (generalized); Osteopenia; Scoliosis; and Vitamin B-complex deficiency. SURGICAL HISTORY   has a past surgical history that includes shoulder surgery (Bilateral) and Hysterectomy.   Additional information:       ALLERGIES   Allergies as of 09/26/2017 - Review Complete 09/26/2017   Allergen Reaction Noted    Celebrex [celecoxib]  09/25/2017    Codeine Other (See Comments) 07/05/2013    Crestor [rosuvastatin] Other (See Comments) 07/05/2013    Ezetimibe-simvastatin Other (See Comments) 07/05/2013    Lipitor Other (See Comments) 07/05/2013    Nasonex [mometasone furoate]  09/25/2017    Other Hives 07/05/2013    Pcn [penicillins] Rash 07/05/2013     Additional information:       MEDICATIONS   Coumadin Use Last 5 Days [x]No []Yes  Antiplatelet drug therapy use last 5 days  [x]No []Yes  Other anticoagulant use last 5 days  [x]No []Yes    Current Facility-Administered Medications:     0.9 % sodium chloride infusion, , Intravenous, Continuous, Mitchell Larkin CNP, Last Rate: 50 mL/hr at 10/09/17 1148    diatrizoate meglumine-sodium (GASTROGRAFIN) 66-10 % solution 30 mL, 30 mL, PEG Tube, ONCE PRN, Mirella Baez MD    potassium chloride 20 MEQ/15ML (10%) oral solution 40 mEq, 40 mEq, Oral, Daily, Pablo Noriega MD, Stopped at 10/07/17 8464   Ana Guthrie MD     Additional information:       VITAL SIGNS   Vitals:    10/09/17 1207   BP: (!) 128/56   Pulse: 74   Resp: 22   Temp:    SpO2: 97%       PHYSICAL:   Heart:  [x]Regular rate and rhythm  []Other:    Lungs:  [x]Clear    []Other:    Abdomen: [x]Soft    []Other:    Mental Status: []Alert & Oriented  [x]Other: stroke does not communicate      PLANNED PROCEDURE   []Biospy []Arteriogram              []Drainage   []Mediport Insertion  []Fistulogram []IV access       []Vertebroplasty / Augmentation  []IVC filter []Dialysis catheter []Biliary drainage  [x]Other: G tube insertion []CAPD Catheter []Nephrostomy Tube / Stent  SEDATION  Planned agent:[x]Midazolam []Meperidine [x]Sublimaze []Dilaudid []Morphine     []Diazepam  []Other:     ASA Classification:  []1 [x]2 []3 []4 []5  Class 1: A normal healthy patient  Class 2: Pt with mild to moderate systemic disease  Class 3: Severe systemic disease or disturbance  Class 4: Severe systemic disorders that are already life threatening. Class 5: Moribund pt with little chances of survival, for more than 24 hours. Mallampati I Airway Classification:   []1 []2 [x]3 []4    [x]Pre-procedure diagnostic studies complete and results available. Comment:    [x]Previous sedation/anesthesia experiences assessed. Comment:    [x]The patient is an appropriate candidate to undergo the planned procedure sedation and anesthesia. (Refer to nursing sedation/analgesia documentation record)  [x]Formulation and discussion of sedation/procedure plan, risks, and expectations with patient and/or responsible adult completed. [x]Patient examined immediately prior to the procedure.  (Refer to nursing sedation/analgesia documentation record)    Nancy Nascimento MD  Electronically signed 10/9/2017 at 12:12 PM

## 2017-10-09 NOTE — PROGRESS NOTES
Formulation and discussion of sedation / procedure plans, risks, benefits, side effects and alternatives with patient and/or responsible adult completed.     Electronically signed by Nathan Morales MD on 10/9/2017 at 12:12 PM

## 2017-10-09 NOTE — PROGRESS NOTES
11:16 AM  Patient arrived to specials room 1. Procedure explained to patient's  who states understanding with no questions at this time. Consent signed by  due to patient's mentation on the floor. 11:26 AM  Patient placed on monitor and moved to procedure bed in supine position. Abdomen prepped for procedure. Dr. Leela Morales notified of patient's sodium level. Also spoke to JACK Dias about the value, she stated she would call Dr. Chrystal Dan to notify and let me know of any new orders. 1140 Dr. Leela Morales arrived to procedure room at patient bedside. NG tube placed per Dr. Leela Morales. 11:50 AM  Procedure for PEG tube placement started per . 12:12 PM  Procedure complete. Dr. Leela Morales to speak with family in consult room. 12:15 PM  Site covered with split gauze and secured with tape. No redness, swelling, or drainage present. 12:16 PM  Patient moved back to transport bed from procedure table. Report given to [employer]. 12:36 PM  Patient taken to 4A per transport.

## 2017-10-09 NOTE — PROGRESS NOTES
[address]  INPATIENT OCCUPATIONAL THERAPY  [facility] NEUROSCIENCES 4A  DAILY NOTE    Time:  Time In: 0800  Time Out: 08  Timed Code Treatment Minutes: 45 Minutes  Minutes: 38          Date: 10/9/2017  Patient Name: Femi Ragsdale,   Gender: female      Room: [facility]006-A  MRN: [id number]  : 1935  (80 y.o.)  Referring Practitioner: Dr. Rajan Florence  Diagnosis: carotid stensosis  Additional Pertinent Hx: Per nurse Pt came in for scheduled stenting. Per Dr. Margert Najjar note: S/p carotid stenting  on  complicated by post-operative radial artery hemorrhage and hypotension ultimately resulting in ARF. extubated on 10/1/17    Restrictions/Precautions:  Restrictions/Precautions: General Precautions, Fall Risk            Position Activity Restriction  Other position/activity restrictions: painful RUE         Past Medical History:   Diagnosis Date    Alcohol abuse     Carotid artery occlusion 2017    History of falling     Hyperlipidemia     Hypertension     Low back pain     Movement disorder     Muscle weakness (generalized)     Osteopenia     Scoliosis     lower lumbar Intervertebral Disc Displacement    Vitamin B-complex deficiency      Past Surgical History:   Procedure Laterality Date    HYSTERECTOMY      SHOULDER SURGERY Bilateral            Subjective       Subjective: pt asleep in bed when arrived. Pt agreeable to get up to chair with therapy. RN okayed therpay session              Pain:  Pain Assessment  Patient Currently in Pain: Denies       Objective  Overall Cognitive Status: Exceptions  Following Commands: Follows one step commands with repetition        ADL  LE Dressing: Dependent/Total (to don slipper socks )      Bed mobility  Supine to Sit: Maximum assistance (HOB slightly elevated and pt required asssitance for LE to EOB and trunk support off bed. Pt required asssiance for sitting EOB due to not able to maintain herslef sitting upright.  Pt wants to lean to

## 2017-10-09 NOTE — PROGRESS NOTES
Pt seen and examined. Doing okay. Respiratory secretions are about the same. Moves all extremities. A+P:  Continue present management. PEG Monday. Please feel free to call with any questions. Zak Rutledge M.D., J.D.   Vascular Neurology and Endovascular Surgical Neuroradiology  Cell Number: [phone]

## 2017-10-09 NOTE — PLAN OF CARE
Problem: Falls - Risk of  Goal: Absence of falls  Outcome: Ongoing  Call light in reach, bed in lowest position, bed alarm activated. Educated on use of call light when in need of assistance. Patient expressed understanding. Hourly visual checks performed and charted. Toileting offered to patient. No falls during shift, at this time. Arm band & falling star in place. Pt has therapies ordered. Pt has not ambulated yet this shift. Continuing to monitor.           Problem: Discharge Planning:  Goal: Discharged to appropriate level of care  Discharged to appropriate level of care    Outcome: Ongoing  Pt to go to TCU once PEG is placed tomorrow. Problem: Pain:  Goal: Pain level will decrease  Pain level will decrease    Outcome: Ongoing  Pt's stated pain goal is \"no pain\". Pt has not complained of any pain yet this shift. Continuing to monitor. Problem: Bleeding:  Goal: Will show no signs and symptoms of excessive bleeding  Will show no signs and symptoms of excessive bleeding    Outcome: Ongoing  No signs of bleeding so far this shift. Continuing to monitor.       Problem: Nutrition  Goal: Optimal nutrition therapy  Outcome: Ongoing  Pt NPO at this time. Pt to have PEG placed Monday. Continuing to monitor.       Problem: Aspiration:  Goal: Absence of aspiration  Absence of aspiration    Outcome: Ongoing  Pt NPO at this time. Pt refusing suctioning at this time. Pt to have PEG placed Monday. Pt has expiratory wheezes in RUL and coarse crackles in left lobes. Continuing to monitor. Problem: NEUROLOGICAL DEFICIT  Goal: Neurological status is stable or improving  Outcome: Ongoing  Pt oriented to time, person and place and disoriented to situation at this time. Pt has raspy voice. No aphasia or dysarthria at this time. Pt has moderate strength left hand grasp, weak right hand grasp and moderate strength left and right dorsiflexion. Pt has weak left and right plantar flexion. Pt's pupils equal and reactive.  Pt denies any numbness or tingling at this time. Pt's NIH 8 at this time. Continuing neuro assessments q 4 hrs and prn.        Problem: HEMODYNAMIC STATUS  Goal: Patient has stable vital signs and fluid balance  Outcome: Ongoing  Pt's vital signs stable at this time. Will continue to monitor pt's bp and 02 saturation. 10/08/17 2002   Vital Signs   Temp 97.9 °F (36.6 °C)   Temp Source Axillary   Pulse 81   Heart Rate Source Monitor   Resp 26   BP (!) 154/72   BP Location Right Arm   BP Upper/Lower Upper   MAP (mmHg) 103   Level of Consciousness 0   MEWS Score 2   Patient Currently in Pain Denies   Pain Assessment   Pain Assessment 0-10   Pain Level 0   Patient's Stated Pain Goal No pain   Oxygen Therapy   SpO2 92 %   O2 Device Nasal cannula       Problem: NUTRITION  Goal: Nutritional status is improving  Outcome: Ongoing      Comments: Care plan reviewed with patient. Patient verbalizes understanding of the plan of care and contributes to goal setting.

## 2017-10-09 NOTE — PROGRESS NOTES
Dr Beach Liter requesting another respiratory therapist for NT suctioning. Lidocaine gel has been requested from pharmacy.

## 2017-10-09 NOTE — PROGRESS NOTES
Metaneb therapy was given to the patient. Vitals before treatment:    heart rate 80   respiratory rate 40  Breath sounds before treatment:  rhonchi- throughout  Medication:  Medication delivered via Metaneb was duoneb. Continuous High Frequency Oscillation (CHFO)   CHFO was applied to patient. Continuous Positive Expiratory Pressure (CPEP)  CPEP was not applied to patient. Vitals during treatment:   heart rate 84   respiratory rate 42  Duration:    Therapy was given for 10 minutes. Tolerance:   Patient tolerated the procedure well.    Vitals after treatment:    heart rate 82   respiratory rate 38  Breath sounds after treatment:  rhonchi- throughout  Cough/sputum:  productive of tan sputum

## 2017-10-09 NOTE — PROGRESS NOTES
reviewed  Hyponatremia likely 2nd to D5W infusion.  Na 125  Stop D5W, Start NS at 50/hr  Na check 18:00  BMP in AM    Discussed with Dr Samantha Alexander, CNP 10/09/17 10:00 AM

## 2017-10-10 NOTE — PROGRESS NOTES
Metaneb therapy was given to the patient. Tx given through bipap     Vitals before treatment:    heart rate 74  respiratory rate 24  Breath sounds before treatment:  diminished breath sounds- scattered and rhonchi- scattered  Medication:  Medication delivered via Metaneb was Duoneb. Continuous High Frequency Oscillation (CHFO)   CHFO was applied to patient. Continuous Positive Expiratory Pressure (CPEP)  CPEP was not applied to patient. Vitals during treatment:   heart rate 75   respiratory rate 22  Duration:    Therapy was given for 5 minutes. Tolerance:   Patient tolerated the procedure well. RT called stat to another room, metaneb stopped, pt remains on bipap.

## 2017-10-10 NOTE — PROGRESS NOTES
Dr Abdiel Jaimes on unit to evaluate patient. He wanted patient off mask and back on nasal cannula. He then put patient on 2L/nc. Patient's oxygen saturation (sat) went to 80%. Rhonda Vogt He then called for respiratory therapist and bipap to be placed.

## 2017-10-10 NOTE — PROGRESS NOTES
[address]  INPATIENT OCCUPATIONAL THERAPY  [facility] ICU 4D  DAILY NOTE    Time:  Time In: 6754  Time Out: 1342  Timed Code Treatment Minutes: 25 Minutes  Minutes: 25        Date: 10/10/2017  Patient Name: Emma Talbert,   Gender: female      Room: [facility]007-A  MRN: [id number]  : 1935  (80 y.o.)  Referring Practitioner: Dr. Garrett Clement  Diagnosis: carotid stensosis  Additional Pertinent Hx: Per nurse Pt came in for scheduled stenting. Per Dr. Marshall Arriaga note: S/p carotid stenting  on  complicated by post-operative radial artery hemorrhage and hypotension ultimately resulting in ARF. extubated on 10/1/17    Restrictions/Precautions:  Restrictions/Precautions: General Precautions, Fall Risk        Position Activity Restriction  Other position/activity restrictions: painful RUE       Past Medical History:   Diagnosis Date    Alcohol abuse     Carotid artery occlusion 2017    History of falling     Hyperlipidemia     Hypertension     Low back pain     Movement disorder     Muscle weakness (generalized)     Osteopenia     Scoliosis     lower lumbar Intervertebral Disc Displacement    Vitamin B-complex deficiency      Past Surgical History:   Procedure Laterality Date    HYSTERECTOMY      SHOULDER SURGERY Bilateral            Subjective   Subjective: Pt seated up in chair and ready to return to bed upon arrival. Nurse ok'd activity       Pain:  Pain Assessment  Patient Currently in Pain: Denies       Objective  Overall Cognitive Status: Exceptions (confusion noted at times, decreased safety and insight, decreased problem solving)    ADL  LE Dressing: Dependent/Total (doffing and donning L sock)  Toileting: Dependent/Total (x2 due to incontinence)        Bed mobility  Rolling to Left: Maximum assistance  Rolling to Right: Maximum assistance  Sit to Supine: Maximum assistance (x2 with vcs for tech)    Transfers  Stand Pivot Transfers:  Moderate assistance (x2 from chair to EOB

## 2017-10-10 NOTE — PROGRESS NOTES
Physical Medicine & Rehabilitation   Progress Note    Chief Complaint:  SAH after L ICA Stenting. Subjective:  Patient resting in bed. Awake. Patient with respiratory issues yesterday. Patient reportedly refusing suction and respiratory treatments causing distress. Patient placed on precedex and bipap. Patient was suctioned and large amount of sputum. Patient weaned off precedex. Patient verbalizes understanding of need and purpose of suction. Patient states she is agreeable to suction as needed. Explained risks of refusing suction and patient's right to refuse. Patient verbalizes understanding, but unsure of ability to carry over education. Rehabilitation:  Physical therapy:   FIMS:  Bed Mobility: Scooting: Moderate assistance (Toward EOB)  Transfers: Sit to Stand: Maximum Assistance (from w/c pt had poor awareness of right UE placement and needed hand over hand, and second attempt had her support her UE during transfer for better awareness)  Stand to sit: Moderate Assistance (cues to control descend needed hand over hand at right UE on arm rest)  Squat Pivot Transfers: Maximum Assistance (From EOB to BS chair. Pt. with poor R UE awareness during transfer), Ambulation 1  Surface: level tile  Device: Hand-Held Assist  Assistance: Moderate assistance (of one person and assist of another )  Quality of Gait: flexed posture pt needed tactile cues for posture, pt decreased step length only able to advance feet a few in and lacking heel strike at daniela LEs offered tactile cues for wt shifting and fatigued easy   Distance: a few feet forward,    FIMS:  , PT Equipment Recommendations  Equipment Needed: No, Assessment: Pt. toleated session fair. Pt. requiring many cues to engage in session this date. Pt. unsteady while seated EOB and during transfer requiring physical assistance for safety. Pt. would benefit from continued skilled PT to increase strength and improve functional mobility.      Occupational therapy: FIMS:   ,  , Assessment: Pt demo need for +2 A for mobility & ADLs at this time d/t weakness, decreased endurance, & decreased cognition. Continued OT recommended to increase Pt's participation in ADLs for increasing indep as PLOF. Speech therapy:    Review of Systems:  CONSTITUTIONAL:  positive for  fatigue  EYES:  negative  HEENT:  positive for  voice change  RESPIRATORY:  positive for  cough with sputum, weak cough   CARDIOVASCULAR:  negative  GASTROINTESTINAL:  positive for incontinence  GENITOURINARY:  aly  SKIN:  bruising  HEMATOLOGIC/LYMPHATIC:  positive for swelling/edema  MUSCULOSKELETAL:  positive for  muscle weakness  NEUROLOGICAL:  positive for memory problems, speech problems, coordination problems, gait problems and weakness  BEHAVIOR/PSYCH:  negative  10 point system review otherwise negative    Objective:  BP (!) 123/51   Pulse 65   Temp 96 °F (35.6 °C) (Rectal)   Resp 20   Ht 5' 3\" (1.6 m)   Wt 134 lb 7.7 oz (61 kg)   SpO2 98%   BMI 23.82 kg/m²   awake  Orientation:   person  Mood: decreased range  Affect: calm  General appearance: Patient is well nourished, well developed, well groomed and in no acute distress, appearing stated age     Memory:   abnormal - deficits noted  Attention/Concentration: good eye contact today  Language:  abnormal - poor vocal quality     Tone:  normal  Muscle bulk: within normal limits  Sensory:  Sensory intact     Skin: warm and dry, no rash or erythema and bruising noted through right arm and hand, also in left wrist.   Peripheral vascular: Pulses: Normal upper extremity pulses.   Edema: 1+ right hand    Diagnostics:   Recent Results (from the past 24 hour(s))   Sodium    Collection Time: 10/09/17  7:16 PM   Result Value Ref Range    Sodium 125 (L) 135 - 145 meq/L   CBC    Collection Time: 10/09/17  8:49 PM   Result Value Ref Range    WBC 16.2 (H) 4.8 - 10.8 thou/mm3    RBC 2.61 (L) 4.20 - 5.40 mill/mm3    Hemoglobin 8.0 (L) 12.0 - 16.0 gm/dl    Hematocrit

## 2017-10-10 NOTE — PROGRESS NOTES
Maximum Assistance (x2 had pt support her right UE due to poor awareness)  Stand to sit: Maximum Assistance (x2)  Stand Pivot Transfers: Maximum Assistance (x2 pt had difficulty advancing her feet but tolerated wbing through LEs )          Balance  Comments: pt sat edge of bed with min to mod assist needed cues for posture and to keep head up had a hard time getting her feet to touch the floor pt demonstrated poor awareness and placement of right UE when sitting edge of bed needed cues to keep it on bed or support it pt sat edge of bed for ~ 10 min         Exercises:  Exercises  Comments: pt completed ex for increased strength pt completed supine ankle pumps followed with heelcord stretch, heelslides with assist at daniela LEs, hip abd/add with assist at daniela LEs, seated long arc qauds with assist at daniela LEs, pt took extra time to complete activity she did require increased assist at right LE vs left with heelslides and long arc quads          Activity Tolerance:  Activity Tolerance: Patient limited by fatigue;Patient limited by endurance    Assessment:   Body structures, Functions, Activity limitations: Decreased functional mobility , Decreased balance, Decreased ROM, Decreased strength, Decreased safe awareness, Decreased cognition, Decreased endurance, Decreased coordination  Assessment: pt tolerated session fair, pt curently in ICU and limited activity due to mulit monitors and lines, pt was cooperative for therapy cont to demonstrate generalized weakness throughout more so at right ayse body vs left and needed cues for right UE awareness, pt required 2 assist for transfers this date, she would benefit from cont theapy to work on strength, balance and increased independence with functional mobility   Prognosis: Good  REQUIRES PT FOLLOW UP: Yes  Discharge Recommendations: [address], Continue to assess pending progress    Patient Education:  Patient Education: POC, Ther ex, Transfers, Bed

## 2017-10-10 NOTE — PROGRESS NOTES
Nutrition Assessment    Type and Reason for Visit: Reassess (po vs NPO monitor)    Nutrition Recommendations:  When OK to start TF, recommend Jevity 1.5 at 25 ml/hr, increase 10 ml/hr every 4 hrs as tolerated to a goal of 45 ml/hr (1620 kcals/69 grams protein). Would recommend 150 ml free water flush every 8 hrs (when IVF's off) unless specified otherwise by MD.  This is in Dr's sticky note & RN aware of. Diet recommendations per SLP.       Malnutrition Assessment:  · Malnutrition Status: At risk for malnutrition    Nutrition Diagnosis:   · Problem: Inadequate oral intake  · Etiology: related to Cognitive or neurological impairment, Difficulty swallowing     Signs and symptoms:  as evidenced by Swallow study results, NPO status due to medical condition, PEG    Nutrition Assessment:  · Subjective Assessment: Pt s/p CVA transferred to ICU for precedex & Bipap per RN. Pt discussed during rounds this morning & per [facility] RN feels like pt is aspirating, pt NPO at this time, off Bipap currently, precedex continues, PEG placed yesterday by Dr Tamera Murillo, but per [facility] RN  TF not started until get the OK per Dr Mallory & this is on her list of questions for him. IVF at 50 ml/hr. Na+ 132 decreased, but improved. SLP following. · Wound Type:  (note PEG placed 10/9/17)  · Current Nutrition Therapies:  · Oral Diet Orders: NPO   · Anthropometric Measures:  · Ht: 5' 3\" (160 cm)   · Current Body Wt: 134 lb 7.7 oz (61 kg) (10/10/17, +1, +2 +3 edema)  · Admission Body Wt: 107 lb (48.5 kg) (9/27 stated)  · Usual Body Wt:  (~ 114# per pt. Note 124# 11/10/14 per EMR.)  · Ideal Body Wt: 115 lb (52.2 kg),   · BMI Classification: BMI 18.5 - 24.9 Normal Weight (edema.  BMI 23.9)  · Comparative Standards (Estimated Nutrition Needs):  · Estimated Daily Total Kcal: 8482-2083 (30-32/kgm admit wt. of 49kgm)  · Estimated Daily Protein (g): 58-68 grams (1.2-1.4/kgm admit wt. of 49kgm)    Estimated Intake vs Estimated Needs: Intake Less Than Needs    Nutrition Risk Level: High    Nutrition Interventions:    (When OK to start TF, plan Jevity 1.5 with goal  of 45  ml/hr)  Continued Inpatient Monitoring, Education not appropriate at this time    Nutrition Evaluation:   · Evaluation: Goals set   · Goals: Pt. will recieve adequate nutrition in 1-4 days. · Monitoring: NPO Status, Ascites/Edema, Mental Status/Confusion, Weight, Pertinent Labs, Chewing/Swallowing    See Adult Nutrition Doc Flowsheet for more detail.      Electronically signed by Jose Savage, DAWSON, LD on 10/10/17 at 11:56 AM    Contact Number: [phone]

## 2017-10-10 NOTE — PROGRESS NOTES
Intensive Care Unit  Intensivist Progress Note    Patient: Carles Holstein  : 1935  MRN#: [id number]  10/10/2017 3:34 PM  ADMISSION DAY:  2017  5:30 AM   Patient Vitals for the past 8 hrs:   BP Temp Temp src Pulse Resp SpO2   10/10/17 1405 (!) 127/55 - - 63 22 95 %   10/10/17 1305 (!) 106/95 - - 61 21 94 %   10/10/17 1205 95/76 97.5 °F (36.4 °C) Oral 64 27 99 %   10/10/17 1100 (!) 123/51 - - 65 20 98 %   10/10/17 1002 (!) 104/39 - - 84 29 97 %   10/10/17 0900 (!) 104/35 - - 61 20 100 %   10/10/17 0827 - - - - 17 100 %   10/10/17 0805 122/61 96 °F (35.6 °C) Rectal 50 16 100 %        Body mass index is 23.82 kg/m². 81 yo  female  PMHx and problems:  - LV diastolic dysfunction CHF (CHFpEF)  - Dyslipidemia                           - Hypercholesterolemia, low HDL at last check ,  in   - CKD by lab review                           - Superimposed ZAIRA - radiocontrast nephropathy vs renal hypoperfusion injury/\"ATN\"  - Current low CO state                           - Volume depletion vs \"cold sepsis\" vs coronary ischemia                           - Exam and NICOM data consistent with low CO, high SVR state  - S/P right parietal strokes by MRI   - Spinal DJD S/P lumbar epidural block/steroid injection 17 (80 mg Depo-Medrol and 2 mL Marcaine 0.25 percent)  - Recent left hemispheric stroke by history with carotid ASCVD                           - S/P Carotid angiography with left ICA stent  - difficult anatomy requiring radial artery approach                           - Radial hemorrhage and right femoral sheath site hemorrhage/hematoma (post-removal sheath) controlled by compression                                                    - Hgb and Hct adequately maintained, PRBC empirically given last night  - ASCVD                           - Rt LE .  Left LE disease with reduced peripheral pulses                           AM U/S:                             - Post-carotid stent \"arrest\"                           - Bradycardia, hypotension - poorly responsive to IV Dopamine, epinephrine, brief CPR given                           - Question of carotid sinus hypersensitivity post-stent  - Elevated alkaline phosphatase                           - Known gallstones by prior U/S 2014, other etiology possible                                    - Improved over time  - Hypoactive delirium  - Pulmonary atelectasis with hypoxemia                                         Multifactorial                           - Retained secretions, weak cough efforts                           - Refusal of tracheal suctioning                           - Dysphagia with elevated aspiration risk                           - Cannot R/O underlying lung disease, but none defined  - Volume overload state with pleural effusions  - Hypoxemia with above  - Hyponatremia   - Impaired free water clearance?   - Improved S/P free water restriction and conivaptan dose    Pt is more alert and responsive. She is talking but I cannot understand her speech. She is cooperative with examination. Pt moved to give Precedex & BiPap, neither currently in use now. General - Awake and more alert in appearance. She moves all extremities to request and speaks with weak vocal tone. Cor - Tele with NSR. PMI ill defined, S1 & S2 distant w/o murmur, rub or gallop heard. There is JVD to ~ sternal angle at 30 degrees head elevation  Resp - Lungs with reduced bibasilar breath sounds, improved air movement left hemithorax compared to last night but still less than right lung. Scattered bilateral rhonchi noted with incomplete clearing after (weak) cough efforts  Extremities - Extensive UE ecchymoses noted (Rt > Left) unchanged from last night. There is mod left, mod-severe right forearm pitting edema noted.  No thigh pitting with trace ankle-pedal pitting edema    Scheduled Meds:   potassium chloride  40 mEq Oral Daily    famotidine (PEPCID) injection nl anion gap  Recent Labs      10/09/17   2049  10/10/17   0457  10/10/17   1425   NA  126*  132*  135   K  4.3  4.8  5.1   CL  92*  96*  102   CO2  22*  23  20*   ANIONGAP  12.0  13.0  13.0   BUN  18  17  20   CREATININE  1.0  1.0  1.1   GLUCOSE  93  91  82   CALCIUM  7.3*  7.5*  8.1*   PROT  4.3*   --    --    LABALBU  2.5*   --    --    BILITOT  0.3   --    --    ALKPHOS  78   --    --    AST  29   --    --    ALT  29   --    --       Other Electrolytes: Hypomagnesemia yesterday  Recent Labs      10/08/17   2253  10/09/17   2049   CAION  1.04*   --    PHOS   --   3.5   MG   --   1.5*     ABGs:   Lab Results   Component Value Date    PH 7.44 10/09/2017    PCO2 35 10/09/2017    PO2 59 10/09/2017    HCO3 24 10/09/2017    O2SAT 91 10/09/2017     Lab Results   Component Value Date    IFIO2 40 10/09/2017    MODE CPAP/PS 10/09/2017    SETTIDVOL 400 10/01/2017    SETPEEP 5.0 10/09/2017     Radiology/Imaging:  CXR - less rotated today, netter aerated, still with bilateral pleural effusions, basilar atelectasis vs infiltrates    ID: No new (+) culture data    KEY ISSUES/FINDINGS/DISCUSSION / RECOMMENDATIONS:  PMHx and problems as noted  Improved hypoactive delirium - may reflect improvement in hyponatremia S/P V1 antagonist therapy  Rising WBC - at risk of infection - respiratory,  (catheter out), etc  (+) I/O, increased weight, edema UE & Pleural effusions    Suggestions  ID   - Pan culture   - Azithromycin 10/4 - present - consider cessation    Resp   - NIV and suction as tolerated   - Mobilize from from bed to aid lung expansion   - Bronchodilator aerosols (beta2-agonists) to aid resp secretion clearance   - Would not sedate unless no other options in mgmt    Cor   - Past LV diastolic dysfunction, 0/15 LVEF reduced mildly   - \"Afterload reduction\" as BP & renal function allow    Heme   - Retic count, assess for RBC synthetic defect vs loss   - Transfusion per guidelines as needed    F/E/N   - Free water restrict   -

## 2017-10-10 NOTE — PLAN OF CARE
Problem: Nutrition  Goal: Optimal nutrition therapy  Outcome: Ongoing  Nutrition Problem: Inadequate oral intake  Intervention: Food and/or Nutrient Delivery:  (When OK to start TF, plan Jevity 1.5 with goal  of 45  ml/hr)  Nutritional Goals: Pt. will recieve adequate nutrition in 1-4 days.

## 2017-10-10 NOTE — PROGRESS NOTES
Metaneb therapy was given to the patient. Vitals before treatment:    heart rate 60  respiratory rate 22  Breath sounds before treatment:  diminished breath sounds- throughout  Medication:  Medication delivered via Metaneb was Duoneb. Continuous High Frequency Oscillation (CHFO)   CHFO was not applied to patient. Continuous Positive Expiratory Pressure (CPEP)  CPEP was applied to patient. Vitals during treatment:   heart rate 59  respiratory rate 22  Duration:    Therapy was given for 10 minutes. Tolerance:   Patient tolerated the procedure well.    Vitals after treatment:    heart rate 61   respiratory rate 20  Breath sounds after treatment:  normal and diminished breath sounds- throughout  Cough/sputum:  productive

## 2017-10-10 NOTE — PROGRESS NOTES
Patient back from specials with PEG placed. Dressing clean, dry & intact. . Patient lethargic but arousable.

## 2017-10-10 NOTE — PROGRESS NOTES
precedex off. Pt oriented to person and place. Voice soft, hoarse. Up to chair with therapy, 2 assist.  at bedside.

## 2017-10-10 NOTE — PROGRESS NOTES
Neurology Inpatient Progress Note:  Date of Service: 10/10/17  Chief Complaint: No chief complaint on file. ASSESSMENT AND PLAN:  1. Carotid artery stenting w/ post-op respiratory failure s/p extubation x2. She is doing better since transfer to ICU on Precedex.  -Continue cough assist mechanisms  -revaluation for rehab/TCU  -start tube feeds  -Precedex weaned to dc.   -Will transfer to 4 A when bed open  -DVT prophylaxis  At least 32 minutes have been spent on the care of this patient. Greater than 50% of the floor time has been spent providing counseling/coordination of care with patient, family, and/or other providers/agencies involved with the patient's care. Estrella Sawyer M.D., J.D. Vascular Neurology and Endovascular Surgical Neuroradiology  Cell: [phone]      S: No acute events overnight. Neurologic status is better. ROS was reviewed today and unchanged since last review. Namely, there are no new rashes, no new symptoms concerning for anaphylaxis, no new black/tarry stools, no new symptoms of temperature intolerance.     Medications:  Current Facility-Administered Medications: 0.9 % sodium chloride infusion, , Intravenous, Continuous  diatrizoate meglumine-sodium (GASTROGRAFIN) 66-10 % solution 30 mL, 30 mL, PEG Tube, ONCE PRN  lidocaine viscous (XYLOCAINE) 2 % solution 5 mL, 5 mL, Nasal, PRN  famotidine (PEPCID) injection 10 mg, 10 mg, Intravenous, BID  miconazole (MICOTIN) 2 % powder, , Topical, BID  clopidogrel (PLAVIX) tablet 150 mg, 150 mg, Oral, Once  ipratropium-albuterol (DUONEB) nebulizer solution 1 ampule, 1 ampule, Inhalation, Q4H  azithromycin (ZITHROMAX) 250 mg in dextrose 5 % 250 mL IVPB, 250 mg, Intravenous, Q24H  sodium chloride flush 0.9 % injection 10 mL, 10 mL, Intravenous, Q12H  potassium chloride (KLOR-CON M) extended release tablet 40 mEq, 40 mEq, Oral, PRN **OR** potassium chloride 20 MEQ/15ML (10%) oral solution 40 mEq, 40 mEq, Oral, PRN **OR** potassium chloride 10

## 2017-10-10 NOTE — PROGRESS NOTES
Renal Progress Note    Assessment and Plan: 1. Acute kidney injury resolved   2. Hyponatremia due to D5W infusion much better   3. Leukicytosis   4. Normicytic,normchromic anemia   5. S/p recent CVA  6. Carotid artery stenosis S/P recent stenting   7. Deconditioning   8. Hypoxia better with CPAP  PLAN:  Reviewed labs from this am  Reviewed medications   Ok to continue IV fluid for now till feeding is started  Check serum sodium this afternoon  AM labs   Will see prn  Discussed with staff    Patient Active Problem List:     TIA (transient ischemic attack)     Hyponatremia     Obesity     Abnormal resting ECG findings     Jaundice     Fatigue     Hypokalemia     Benign hypertension     Hyperlipidemia     Elevated LFTs     Elevated lipase     Encounter for diagnostic colonoscopy due to change in bowel habits     Cerebrovascular accident (CVA) due to stenosis of left carotid artery (HCC)     Chronic congestive heart failure with left ventricular diastolic dysfunction (HCC)     Encephalopathy     Cardiac arrest (HCC)     Stupor     Acute respiratory failure with hypercapnia (HCC)     ZAIRA (acute kidney injury) ([facility] [address]     Metabolic acidosis     Metabolic alkalosis     Hypotension     Dehydration      Subjective:   Admit Date: 9/26/2017    Interval History:   Seeing for acute kidney   Very sleepy this am  Updated by the staff  Blood pressure is stable   Urine output not documented   Weight significantly up but doubt the accuracy   Admitted for CVA initially  Had carotid stenting for carotid artery stenosis   Had significant right femoral artery bleed post procedure   Was on vent and extubated   Transferred to  A  Became hypoxic yesterday  Serum sodium was low due to D5W infusion as oral intake was poor   Transferred to CCU for sedation and CPAP placed       Medications:   Scheduled Meds:   potassium chloride  40 mEq Oral Daily    famotidine (PEPCID) injection  10 mg Intravenous BID    miconazole   Topical BID    clopidogrel

## 2017-10-10 NOTE — FLOWSHEET NOTE
2220 Call from 4A nurse. 4A nurse states Dr. Bárbara Hook spoke with Gibson Dacosta RN for medication clarification and stated is okay for Vaprisol. 0600 Dr. Floyd Park in to see patient. Discussed sodium level. Orders for BMP and recheck sodium level. Dr. Floyd Park states he will sign off case as ZAIRA has improved.

## 2017-10-10 NOTE — PLAN OF CARE
Problem: Falls - Risk of  Goal: Absence of falls  Outcome: Ongoing  Falling star, armband - bed alarm on while in bed. Up with 2 assist. Working with pt and ot. Generalized weakness noted t/o     Problem: Pain:  Goal: Pain level will decrease  Pain level will decrease    Outcome: Ongoing  Does c/o some chronic back pain on occasion, tenderness at peg tube site. Problem: Bleeding:  Goal: Will show no signs and symptoms of excessive bleeding  Will show no signs and symptoms of excessive bleeding    Outcome: Completed Date Met: 10/10/17      Problem: Nutrition  Goal: Optimal nutrition therapy  Outcome: Ongoing  Anticipating starting feedings per peg tube     Problem: Aspiration:  Goal: Absence of aspiration  Absence of aspiration    Outcome: Ongoing  Hob elevated >30 degrees, freq oral care    Problem: NEUROLOGICAL DEFICIT  Goal: Neurological status is stable or improving  Outcome: Ongoing  Pt confused at times. Follows commands x4    Problem: HEMODYNAMIC STATUS  Goal: Patient has stable vital signs and fluid balance  Outcome: Completed Date Met: 10/10/17      Problem: ASPIRATION PRECAUTIONS  Goal: Patient's risk of aspiration is minimized  Outcome: Ongoing      Comments: Care plan reviewed with patient and family; verbalize understanding of the plan of care and contribute to goal setting.

## 2017-10-10 NOTE — PROGRESS NOTES
Pt seen and examined. S/p peg tube placement. Pt notes to be having increased work of breathing and tachypnea. She had reduced lung sounds bilaterally w/ wheezing. There was clearly restricted air movement. Duonebs ordered stat. Pt had some improvement. She continued to also have coarse upper airway sounds as well. CXR findings are noted. Pt had aggressive suctioning done at bedside, she did appear to improve. CPAP ordered. Appreciate Dr. Aura Brito assistance w/ this case today. Pt is being transferred to ICU for precedex to enable suctioning and ensure that she tolerates CPAP.  150 minutes was spent at bedside w/ pt and family. Please feel free to call with any questions. Stevo Charles M.D., J.D.   Vascular Neurology and Endovascular Surgical Neuroradiology  Cell Number: [phone]

## 2017-10-10 NOTE — FLOWSHEET NOTE
During my contact with the patient, family members were present. I offered emotional support, nurtured hope, provided words of encouragement and a prayer of healing/comfort to the pt./family. I also placed a spiritual care card in the room. Spiritual plan: It would be helpful if Spiritual Care would continue to follow up on this case.       10/10/17 0949   Encounter Summary   Services provided to: Patient and family together   Referral/Consult From: [address] [place] [address] Children;Family members   Continue Visiting Yes  (10/10/17 )   Complexity of Encounter Moderate   Length of Encounter 15 minutes   Routine   Type Follow up   Assessment Coping   Intervention Active listening;Nurtured hope;Prayer   Outcome Expressed gratitude;Engaged in conversation   Spiritual/Cheondoism   Type Spiritual support

## 2017-10-10 NOTE — PROGRESS NOTES
bipap is alarming apnea ventilation and flipping into PCV+ mode. Pt having some short episodes of apnea which are witnessed. Pt is able to be aroused. MARIAA hoyt aware of this. Pt is on precedex. Will continue to monitor pt for apnea.

## 2017-10-11 NOTE — PLAN OF CARE
Problem: Falls - Risk of  Goal: Absence of falls  Outcome: Ongoing  Patient has fall risk precautions, bed alarm set, two person assist to move, call light within reach and oriented to use if she needs assistance. Problem: Discharge Planning:  Goal: Discharged to appropriate level of care  Discharged to appropriate level of care    Outcome: Ongoing  Patient and family participating in plan of care, patient admitted to ICU possible discharge to other floor. Problem: Pain:  Goal: Pain level will decrease  Pain level will decrease    Outcome: Met This Shift  Patient has had no complaints of pain this shift. Problem: Nutrition  Goal: Optimal nutrition therapy  Outcome: Ongoing  Patient has Jevity 1.5 running at 40 mls through peg tube for nocturnal feedings. Tolerating well, no residual in tube. Problem: Aspiration:  Goal: Absence of aspiration  Absence of aspiration    Outcome: Ongoing  Patient is currently NPO status, oral care and Peridex being used. Comments: Care plan reviewed with patient. Patient verbalizes understanding of the plan of care and contribute to goal setting.

## 2017-10-11 NOTE — PROGRESS NOTES
Nutrition Assessment    Type and Reason for Visit: Reassess, Consult (cyclic TF ordering)    Nutrition Recommendations: Continue TF as ordered to goal. Free H20 per MD - recommend 115ml every 6h when IVF is off. Malnutrition Assessment:  · Malnutrition Status: At risk for malnutrition    Nutrition Diagnosis:   · Problem: Inadequate oral intake  · Etiology: related to Cognitive or neurological impairment, Difficulty swallowing     Signs and symptoms:  as evidenced by NPO status due to medical condition, Swallow study results, Nutrition support - EN    Nutrition Assessment:  · Subjective Assessment: Pt. s/p CVA; SLP evaluated and recommends NPO; Dr. Scott Kan requests cyclic TF and pt. was on TF last night and tolerated well at 45ml/hour so will resume tonight at that rate and gradually increase to goal to meet 100% of needs while NPO; meds include ATB; glucose 145, BUN 20, creatinine 1; BM x2; pt. sitting in chair at bedside this am  · Wound Type:  (note PEG placed 10/9/17)  · Current Nutrition Therapies:  · Oral Diet Orders: NPO   · Tube Feeding (TF) Orders:   · Feeding Route: Gastrostomy  · Formula: Standard w/Fiber (Jevity 1.5)  · Rate (ml/hr):45ml/hour increasing 10ml every 2h to goal of 90ml/hour    · Volume (ml/day): 1080ml at goal  · Duration: Cyclic (6787-1096)  · TF Residuals: Less than or equal to 250ml  · Water Flushes:  (without IVF recommend 115ml every 6h)  · Goal TF & Flush Orders Provides: 1620 kcals, 69 grams protein, 233 grams CHO, 821ml free H20/24h  · Anthropometric Measures:  · Ht: 5' 3\" (160 cm)   · Current Body Wt: 131 lb (59.4 kg) (10/11 with +1-+2 edema)  · Admission Body Wt: 107 lb (48.5 kg) (9/27 stated)  · Usual Body Wt:  (~ 114# per pt.   Note 124# 11/10/14 per EMR.)  · Ideal Body Wt: 115 lb (52.2 kg), % Ideal Body 114%  · BMI Classification: BMI 18.5 - 24.9 Normal Weight (23.4)  · Comparative Standards (Estimated Nutrition Needs):  · Estimated Daily Total Kcal: 4143-7906 (30-32/kgm admit wt. of 49kgm)  · Estimated Daily Protein (g): 58-68 grams (1.2-1.4/kgm admit wt. of 49kgm)  · Estimated Daily Fluid (ml/day): 1555 mls    Estimated Intake vs Estimated Needs: Intake Improving    Nutrition Risk Level: High    Nutrition Interventions:   Continue NPO, Modify current Tube Feeding  Continued Inpatient Monitoring, Education not appropriate at this time    Nutrition Evaluation:   · Evaluation: Progressing toward goals   · Goals: TF to provide 75% of nutrient needs until able to transition to po diet   · Monitoring: NPO Status, TF Intake, TF Tolerance, Skin Integrity, Ascites/Edema, Weight, Pertinent Labs, Chewing/Swallowing    See Adult Nutrition Doc Flowsheet for more detail.      Electronically signed by Gladys Chanel RD, LD on 10/11/17 at 10:57 AM    Contact Number: [phone]

## 2017-10-11 NOTE — PROGRESS NOTES
[address] ICU 4D    Time In: 910  Time Out: 0948  Timed Code Treatment Minutes: 45 Minutes  Minutes: 38          Date: 10/11/2017  Patient Name: Nash Segura,  Gender:  female        MRN: [id number]  : 1935  (80 y.o.)     Referring Practitioner: Dr. Alice Yadav  Diagnosis: carotid stenosis  Additional Pertinent Hx: admit with above diagnosis, ZAIRA, hypotension, left artery carotid setnosis s/p stenting     Past Medical History:   Diagnosis Date    Alcohol abuse     Carotid artery occlusion 2017    History of falling     Hyperlipidemia     Hypertension     Low back pain     Movement disorder     Muscle weakness (generalized)     Osteopenia     Scoliosis     lower lumbar Intervertebral Disc Displacement    Vitamin B-complex deficiency      Past Surgical History:   Procedure Laterality Date    HYSTERECTOMY      SHOULDER SURGERY Bilateral        Restrictions/Precautions:  Restrictions/Precautions: General Precautions, Fall Risk            Position Activity Restriction  Other position/activity restrictions: painful RUE         Subjective:     Subjective: pt in bed on arrival and agreeable for therapy, she cont to be in ICU with lines and monitors, she was hard to understand her at times as she is very soft spoken, pt does c/o of burning pain in left thigh and inner thigh area at times during session     Pain:   .  Pain Assessment  Pain Level:  (no number given pt c/o of burning pain in left inner thigh and thigh area at times that comes and goes )       Social/Functional:  Type of Home: Facility  Home Layout: One level     Objective:  Rolling to Right: Minimal assistance (with extra time given )  Supine to Sit: Moderate assistance (at trunk, with extra time she did get her LEs off edge of bed and c/o of burning pain in left thigh )  Scooting:  Moderate assistance (to edge of bed with cues for right UE placement on bed)    Transfers  Sit to Stand: Maximum Assistance (x2with pt supporting her right UE )  Stand to sit: Maximum Assistance (x2 wtih pt supporting her right UE)  Stand Pivot Transfers: Maximum Assistance (x2, she was able to advance her LEs however had post lean needing max x 2 for balance )       Balance  Comments: pt sat edge of bed with right UE at support with CGA for balance and cues for right UE dyanmic balance pt reaching out of base of support ~ 2 in needed hand over hand at right UE to keep in contact with bed and needed min assist for balance to come back to midline, static standing with UE at support wtih max assist x 2 with post lean      Exercises:  Exercises  Comments: pt completed ex for increased strength pt completed supine ankle pumps, glut sets, qaud sets, short arc qauds with cues to control descend, heelslides with pain at left LE and needed min assist at right LE, hip abd/add, seated long arc quads with cues for full range and lt min assist at right LE to complete range x 10 reps each          Activity Tolerance:  Activity Tolerance: Patient limited by fatigue;Patient limited by endurance    Assessment:   Body structures, Functions, Activity limitations: Decreased functional mobility , Decreased balance, Decreased ROM, Decreased strength, Decreased safe awareness, Decreased cognition, Decreased endurance, Decreased coordination  Assessment: pt tolerated session fair, pt cont to be in ICU with multi lines and monitors and limited activity due to this, pt needing hands on assist with bed mobility and assist of 2 persons to transfer to the chair she was able to advance her feet this date however leaning post, she cont to demonstrate generalized weakness more so at right ayse body than left and with decreased sitting and standing balance, pt would benefit from cont therapy to work on strength, balance and increaesd independence with functional mobility   Prognosis: Good  REQUIRES PT FOLLOW UP: Yes  Discharge Recommendations: [address], Continue to assess pending progress    Patient Education:  Patient Education: POC, Ther ex, Transfers, Bed mobility. Barriers to Learning: cognition    Equipment Recommendations:  Equipment Needed: No    Safety:  Type of devices: All fall risk precautions in place, Call light within reach, Nurse notified, Gait belt, Patient at risk for falls, Left in chair, Chair alarm in place    Plan:  Times per week: 5X N  Times per day: Daily  Specific instructions for Next Treatment: therex and mobility, sitting balance, std pregait activity  Current Treatment Recommendations: Strengthening, ROM, Endurance Training, Equipment Evaluation, Education, & procurement, Cognitive Reorientation, Balance Training, Functional Mobility Training, Gait Training, Home Exercise Program, Transfer Training, Safety Education & Training, Patient/Caregiver Education & Training    Goals:  Patient goals : not stated, pt confused    Short term goals  Time Frame for Short term goals: 2 weeks  Short term goal 1: bed mobility with modAx1 to get in/out of bed  Short term goal 2: transfer with minAx1 to get in/out of chairs  Short term goal 3: amb 5'x1 with UE support and modAx1 to walk safely bed to/from chair    Long term goals  Time Frame for Long term goals : no LTGs set secondary to vin AMADOR    PT G-Codes  Functional Assessment Tool Used: professional judgement  Functional Limitation: Mobility: Walking and moving around  Mobility: Walking and Moving Around Current Status (): At least 60 percent but less than 80 percent impaired, limited or restricted  Mobility: Walking and Moving Around Goal Status ():  At least 40 percent but less than 60 percent impaired, limited or restricted

## 2017-10-11 NOTE — PROGRESS NOTES
Systems:  CONSTITUTIONAL:  positive for  fatigue  RESPIRATORY:  positive for  cough with sputum  CARDIOVASCULAR:  negative  GASTROINTESTINAL:  positive for incontinence  GENITOURINARY:  positive for aly  MUSCULOSKELETAL:  positive for  muscle weakness  NEUROLOGICAL:  positive for memory problems, speech problems, coordination problems, gait problems and weakness  BEHAVIOR/PSYCH:  negative  10 point system review otherwise negative    Objective:  BP (!) 143/53   Pulse 78   Temp 97.5 °F (36.4 °C) (Oral)   Resp 28   Ht 5' 3\" (1.6 m)   Wt 131 lb 13.4 oz (59.8 kg)   SpO2 97%   BMI 23.35 kg/m²     awake  Orientation:   person  Mood: decreased range  Affect: calm  General appearance: Patient is well nourished, well developed, well groomed and in no acute distress    Memory:   abnormal - deficits noted   Attention/Concentration: good eye contact  Language:  abnormal - poor vocal quality- better today    Tone:  normal  Muscle bulk: within normal limits  Sensory:  Sensory intact    Skin: warm and dry, no rash or erythema; scattered bruising, large bruise noted on right arm and hand  Peripheral vascular: Pulses: Normal upper and lower extremity pulses; Edema: 1+ right arm and hand      Diagnostics:   Recent Results (from the past 24 hour(s))   Basic Metabolic Panel    Collection Time: 10/10/17  2:25 PM   Result Value Ref Range    Sodium 135 135 - 145 meq/L    Potassium 5.1 3.5 - 5.2 meq/L    Chloride 102 98 - 111 meq/L    CO2 20 (L) 23 - 33 meq/L    Glucose 82 70 - 108 mg/dL    BUN 20 7 - 22 mg/dL    CREATININE 1.1 0.4 - 1.2 mg/dL    Calcium 8.1 (L) 8.5 - 10.5 mg/dL   Anion Gap    Collection Time: 10/10/17  2:25 PM   Result Value Ref Range    Anion Gap 13.0 8.0 - 16.0 meq/L   Glomerular Filtration Rate, Estimated    Collection Time: 10/10/17  2:25 PM   Result Value Ref Range    Est, Glom Filt Rate 48 (A) ml/min/1.73m2   CBC    Collection Time: 10/11/17  8:57 AM   Result Value Ref Range    WBC 19.0 (H) 4.8 - 10.8 thou/mm3    RBC 2.97 (L) 4.20 - 5.40 mill/mm3    Hemoglobin 9.0 (L) 12.0 - 16.0 gm/dl    Hematocrit 27.1 (L) 37.0 - 47.0 %    MCV 91.1 81.0 - 99.0 fL    MCH 30.2 27.0 - 31.0 pg    MCHC 33.2 33.0 - 37.0 gm/dl    RDW 17.1 (H) 11.5 - 14.5 %    Platelets 354 434 - 079 thou/mm3    MPV 7.5 7.4 - 10.4 mcm   Basic Metabolic Panel    Collection Time: 10/11/17  8:57 AM   Result Value Ref Range    Sodium 136 135 - 145 meq/L    Potassium 3.8 3.5 - 5.2 meq/L    Chloride 105 98 - 111 meq/L    CO2 22 (L) 23 - 33 meq/L    Glucose 145 (H) 70 - 108 mg/dL    BUN 20 7 - 22 mg/dL    CREATININE 1.0 0.4 - 1.2 mg/dL    Calcium 7.9 (L) 8.5 - 10.5 mg/dL   Magnesium    Collection Time: 10/11/17  8:57 AM   Result Value Ref Range    Magnesium 2.0 1.6 - 2.4 mg/dL   Phosphorus    Collection Time: 10/11/17  8:57 AM   Result Value Ref Range    Phosphorus 3.4 2.4 - 4.7 mg/dL   Anion Gap    Collection Time: 10/11/17  8:57 AM   Result Value Ref Range    Anion Gap 9.0 8.0 - 16.0 meq/L   Glomerular Filtration Rate, Estimated    Collection Time: 10/11/17  8:57 AM   Result Value Ref Range    Est, Glom Filt Rate 53 (A) ml/min/1.73m2       Impression:  · Subarachnoid hemorrhage, left frontal parietal occipital 9/28/17  · Left ICA stenosis  ? S/p carotid stent 9/26/17  · Aphasia  · Right hemiplegia   · Cognitive deficits   · Dysphagia   · Pneumonia   · Hx ischemic CVAs  · Acute respiratory failure post op stent  · Lumbar pain with radiculopathy   · Alcohol abuse  · Hypertension  · Hyperlipidemia    Plan:  · Continue current therapies  · Patient now NPO  ? PEG - TF  · Patient with refusal of respiratory treatments - if refusal continues, encourage consideration of Palliative care to establish patient's wishes before worsening of respiratory status.    · Transfer to  when bed available  · Await stability for TCU    Missed Therapy Time:  · None    Jen Preston, NP

## 2017-10-11 NOTE — PROGRESS NOTES
[address] [address] 60  INPATIENT OCCUPATIONAL THERAPY  [facility] ICU 4D  DAILY NOTE    Time:  Time In: 6753  Time Out: 1338  Timed Code Treatment Minutes: 23 Minutes  Minutes: 23          Date: 10/11/2017  Patient Name: Abram Lindsey,   Gender: female      Room: [facility]007-A  MRN: [id number]  : 1935  (80 y.o.)  Referring Practitioner: Dr. Carol Juarez  Diagnosis: carotid stensosis  Additional Pertinent Hx: Per nurse Pt came in for scheduled stenting. Per Dr. Bhat Cancer note: S/p carotid stenting  on 3/72/75 complicated by post-operative radial artery hemorrhage and hypotension ultimately resulting in ARF. extubated on 10/1/17    Restrictions/Precautions:  Restrictions/Precautions: General Precautions, Fall Risk            Position Activity Restriction  Other position/activity restrictions: painful RUE         Past Medical History:   Diagnosis Date    Alcohol abuse     Carotid artery occlusion 2017    History of falling     Hyperlipidemia     Hypertension     Low back pain     Movement disorder     Muscle weakness (generalized)     Osteopenia     Scoliosis     lower lumbar Intervertebral Disc Displacement    Vitamin B-complex deficiency      Past Surgical History:   Procedure Laterality Date    HYSTERECTOMY      SHOULDER SURGERY Bilateral            Subjective       Subjective: Pt sitting up in chair upon arrival with spouse present. Pt reproting doing well but she was ready to return to bed. Pain:  Pain Assessment  Patient Currently in Pain: No       Objective  Following Commands:  Follows one step commands with repetition  Cognition Comment: Pt slow to process, difficult to understand at times when attempting to answer questions,                                                                    ADL  LE Dressing: Dependent/Total (donning socks )  Toileting: Dependent/Total (changing depends )          Bed mobility  Sit to Supine: Maximum assistance (x2 )    Transfers  Stand Pivot Transfers: Moderate assistance (x2 from chair to EOB with pt able to move feet but then had a difficulty stepping back to bed )  Sit to stand: Moderate assistance (x2 from bedside chair and EOB. Pt educated on tech and proper foot placement d/t wanting to keep feet to far in front of her. Pt with wide base of support as well )  Stand to sit: Maximum assistance (x2 for safe decent onto EOB. completed a second time to place pt back curther onto bed to decrease fall risk )       Balance  Sitting Balance: Minimal assistance (at EOB with education on foot placement d/t pt keeping them to far in front of her causing a posterior lean )  Standing Balance: Moderate assistance (x2 on 2 trials with wide base of support )     Time: x1 min x 2 trials   Activity: in prep for mobility                                          Type of ROM/Therapeutic Exercise: AROM  Comment: completed bilateral UE AROM in shoulder flexion with right at approx 90 degrees and left higher to increase activity movement of UEs for participation in ADL tasks. Pt wiht increased fatigue after completion of ex. Pts UEs placed on pillow for comfort and decreased swelling. Activity Tolerance:  Activity Tolerance: Patient limited by fatigue  Activity Tolerance: Pt awake and alert this date but fatigues quickly.      Assessment:     Performance deficits / Impairments: Decreased functional mobility , Decreased ADL status, Decreased endurance, Decreased ROM, Decreased strength, Decreased safe awareness, Decreased cognition, Decreased balance  Prognosis: Fair  Discharge Recommendations: Continue to assess pending progress    Patient Education:  Patient Education: tech for stand pivot transfers  Barriers to Learning: decreased cognition    Equipment Recommendations:  Equipment Needed: No  Other: Continue to assess    Safety:  Safety Devices in place: Yes  Type of devices: Gait belt, Nurse notified, Patient at risk for falls, All fall risk

## 2017-10-11 NOTE — PROGRESS NOTES
Metaneb therapy was given to the patient. Vitals before treatment:    heart rate 78    respiratory rate 18  Breath sounds before treatment:  rhonchi- throughout  Medication:  Medication delivered via Metaneb was Duoneb. Continuous High Frequency Oscillation (CHFO)   CHFO was applied to patient. Continuous Positive Expiratory Pressure (CPEP)  CPEP was applied to patient. Duration:    Therapy was given for 1 minutes.    Tolerance:   Patient tolerated the procedure poorly and the procedure was terminated prior to completion, at the patient's request.   [place] Areas after treatment:    heart rate 77   respiratory rate 16  Breath sounds after treatment:  rhonchi- throughout  Cough/sputum:  nonproductive

## 2017-10-11 NOTE — PROGRESS NOTES
Up to chair with 2 assist - remains weak and unsteady on feet. Pt more alert today but remains confused.  at side.

## 2017-10-11 NOTE — PROGRESS NOTES
Phone call received from [facility] regarding precert, instructions given to fax updated clinicals to RN reviewer.

## 2017-10-12 NOTE — PLAN OF CARE
Problem: RESPIRATORY  Goal: Clear lung sounds  Outcome: Ongoing  Pt refusing PD&P and acapella. Pt was pushing me away saying no, as I was doing PD&P. When I tried to do acapella, pt covered her mouth and saying no. Breathing tx given tonight with aerosol mask. Pts left lung is diminished with crackles, and right is fairly clear at this time.

## 2017-10-12 NOTE — PROGRESS NOTES
Pt seen late last PM. She was sleeping. Transferred to floor yesterday. TCU/IPR consults pending. Please feel free to call with any questions. Emanuel Cross M.D., J.D.   Vascular Neurology and Endovascular Surgical Neuroradiology  Cell Number: [phone]

## 2017-10-12 NOTE — PLAN OF CARE
Problem: RESPIRATORY  Goal: Normal spontaneous ventilation  Outcome: Met This Shift  Pt is refusing her BIPAP.

## 2017-10-12 NOTE — PROGRESS NOTES
[facility] 60  INPATIENT OCCUPATIONAL THERAPY  [facility] NEUROSCIENCES 4A  DAILY NOTE    Time:  Time In: 845  Time Out: 4580  Timed Code Treatment Minutes: 45 Minutes  Minutes: 38          Date: 10/12/2017  Patient Name: Hilda Mariscal,   Gender: female      Room: [facility]011-A  MRN: [id number]  : 1935  (80 y.o.)  Referring Practitioner: Dr. Tidwell Client  Diagnosis: carotid stensosis  Additional Pertinent Hx: Per nurse Pt came in for scheduled stenting. Per Dr. Mari Ortiz note: S/p carotid stenting  on 5/73/10 complicated by post-operative radial artery hemorrhage and hypotension ultimately resulting in ARF. extubated on 10/1/17    Restrictions/Precautions:  Restrictions/Precautions: General Precautions, Fall Risk            Position Activity Restriction  Other position/activity restrictions: painful RUE         Past Medical History:   Diagnosis Date    Alcohol abuse     Carotid artery occlusion 2017    History of falling     Hyperlipidemia     Hypertension     Low back pain     Movement disorder     Muscle weakness (generalized)     Osteopenia     Scoliosis     lower lumbar Intervertebral Disc Displacement    Vitamin B-complex deficiency      Past Surgical History:   Procedure Laterality Date    HYSTERECTOMY      SHOULDER SURGERY Bilateral            Subjective       Subjective: pt lying in bed upon arrival. Pt agreeable to OT. RN okayed therapy session. Pain:  Pain Assessment  Patient Currently in Pain: Yes  Pain Level:  (no number given )  Pain Type: Acute pain  Pain Location:  (stomach area where feeding tube in place )       Objective  Overall Cognitive Status: Exceptions  Following Commands:  Follows one step commands with repetition  Cognition Comment: Pt slow to process, difficult to understand at times when attempting to answer questions,     Bed mobility  Supine to Sit: Moderate assistance (at trunk and pt did get LE to EOB with time needed )    Transfers  Stand goals  Time Frame for Short term goals: 1 week  Short term goal 1: Complete various stand-pivot t/f to [facility] or bedside chair with 0>mod A x 1  Short term goal 2: Complete 1 min standing with min A & min vcs for posture  Short term goal 3: Follow 75% of simple commands to increase participation in ADL tasks  Short term goal 4: Complete BUE gentle AROM exercises to increase AROM BUE for ease of grooming  Short term goal 5:  Tolerate further assessment of ambulation when appropriate  Long term goals  Time Frame for Long term goals : No LTG set d/t short ELOS

## 2017-10-12 NOTE — CONSULTS
[address] [place] [address]                                 CONSULTATION    PATIENT NAME: Dalton Beckham                    :             1935  MED REC NO:   [id number]                            ROOM:            0011  ACCOUNT NO:   [de-identified]                            ADMISSION DATE:  2017  PROVIDER:    Luanna Kayser, D.O.    CONSULT DATE:  10/12/2017    REASON FOR EVALUATION:  Dysrhythmia and possible blood pressure issues  in particular patient. HISTORY OF PRESENT ILLNESS:  The cardiologist that saw the patient was  Dr. Amanda Swift who saw the patient when the patient had cardiac arrest  after left internal carotid stenting. The patient had left internal  carotid stenting. There were some complex issues involved with the  therapy and then was sent to ICU. While the patient was in ICU, the  patient had cardiac arrest and apparently had severe, that was started  on dopamine. Dr. Amanda Swift was consulted to take the patient and  evaluate the patient at which time the patient underwent diagnostic  evaluation by Dr. Amanda Swift. This involved a cardiac catheterization in  September. Please refer to the findings on that cardiac  catheterization in his dictation and my role on this patient is  re-consultation while the patient is here. I saw the patient, the  patient was in [facility]. So while in TCU, the patient had some  dysrhythmias and low blood pressure, thus is what is stated on this  patient and it is on that basis that actually Cardiology is seeing the  patient. Notably, the patient is not a good historian, not enough  desirable data is obtained from her, but she is not in any acute  distress and workup on this patient includes magnesium which was 2.0,  BUN 20, creatinine 1.0, potassium 3.8. CBC:  Hemoglobin 9.0,  hematocrit of 27.1, platelets of 480 and x-rays were performed on the  patient. Chest x-ray:  Normal heart.   There biventricular  heave. No right ventricular heave. LUNGS:  Diminished breath sounds bilaterally. No wheezes but  decreased breath sounds noted bilaterally. ABDOMEN:  No organomegaly, no bruits, no masses. Bowel sounds  hypoactive in all quadrants. SKIN:  With some ecchymosis. NEUROLOGIC:  Pulses are symmetric and intact for radial, carotid, and  femoral.  MUSCULOSKELETAL:  Some weaknesses in the extremities noted. ASSESSMENT:  1.  Status post presentation with dysrhythmia ? blood pressure issues,  unclear whether the blood pressure dropped or not. 2.  Status post intervention for the carotids. 3.  Status post cardiac catheterization with occlusive vessel. Refer  to Dr. Ibrahima Ramsay notes but not intervenable. 4.  Mild LV dysfunction. PLAN:  1. Continue Plavix and aspirin as per Neurology. 2.  Add metoprolol to suppress arrhythmia 25 b.i.d.  3.  Add lisinopril 5 mg a day for cardiomyopathy protection. Unable  to use statin because of the patient's allergies to this regimen. 4.  Monitor for aspiration pneumonia and congestive heart failure. DVT prophylaxis suggested. Again, thanks for allowing us to participate in the patient's care.         Sara Edwards D.O.    D: 10/12/2017 10:50:55       T: 10/12/2017 12:28:37     [email or web address]  Job#: [id number]     Doc#: [id number]

## 2017-10-12 NOTE — PLAN OF CARE
Problem: Nutrition  Goal: Optimal nutrition therapy  Outcome: Ongoing  Nutrition Problem: Inadequate oral intake  Intervention: Food and/or Nutrient Delivery: Continue NPO, Continue current Tube Feeding  Nutritional Goals: TF to provide 75% of estimated nutritional need until able to transition to PO diet during LOS.

## 2017-10-12 NOTE — PLAN OF CARE
Problem: Falls - Risk of  Goal: Absence of falls  Outcome: Ongoing  No falls at this time. Bed alarm on at all times and more frequent rounds implemented. Problem: Discharge Planning:  Goal: Discharged to appropriate level of care  Discharged to appropriate level of care    Outcome: Ongoing  Discharge planning in process and discussed with patient/family. Family aware of plan to discharge to TCU at this time. Social work consulted for any additional needs. Care manager aware of discharge needs. Problem: Pain:  Goal: Pain level will decrease  Pain level will decrease    Outcome: Ongoing  Patient denies any pain at this time. Will continue to assess. Problem: Bleeding:  Goal: Will show no signs and symptoms of excessive bleeding  Will show no signs and symptoms of excessive bleeding    Outcome: Ongoing  Patient has significant bruising noted to upper extremities and scattered throughout. Patient's vitals remain within normal limits and patient shows no signs of bleeding at this time. Will continue to monitor and assess. Problem: NUTRITION  Goal: Nutritional status is improving  Outcome: Ongoing  Patient receiving tube feed through PEG tube. Patient receiving Jevity 1.5 starting at 45 mL/hr with a goal of 90 mL/hr increasing rate at 10mL/hr every two hours. Patient tolerating tube feed at this time. Problem: ASPIRATION PRECAUTIONS  Goal: Patient's risk of aspiration is minimized  Outcome: Ongoing  Patient NPO at this time. Oral suctioning at bedside. Head of bed maintained at 30 degrees. Comments: Care plan reviewed with patient and rn. Patient and rn verbalize understanding of the plan of care and contribute to goal setting.

## 2017-10-12 NOTE — PROGRESS NOTES
[address] [facility]ERAP[facility]  STR ICU 4D    TIME   SLP Individual Minutes  Time In: 0112  Time Out:   Minutes: 12  Timed Code Treatment Minutes: 0 Minutes       [x]Daily Note  []Progress Note  []Discharge Note    Date: 10/12/2017  Patient Name: Aldo Montanez        MRN: [id number]    : 1935  (80 y.o.)  Gender: female   Primary Provider: Gabriel Jeffries MD  Admitting Diagnosis:  CVA   Secondary Diagnosis: Dysphagia  Precautions: Aspiration  Swallowing Status/Diet: NPo with peg tube  Swallowing Strategies: n/a  DATE of last MBS:  FEES approx 10/6    Subjective: Pt seen at bedside, awakened upon arrival but with varying alertness. No family present. SHORT TERM GOAL #1:  Goal 1: Patient will tolerate dysphagia I with honey thick liquids without s/s of aspiration in order to safely maintain adequate hydration and nutrition. INTERVENTIONS:  RN reports pt with rhonchi lung sounds. Pt's vocal quality was severely breathy and wet upon arrival, indicative of decreased secretion management. Pt unable to elicit a cough on command. After positioning patient in upright position in bed, trialed teaspoon amounts of honey thickened water x3 via spoon. Pt did not elicit a swallow reflex following ~10 seconds with the first trial, resulting in immediate coughing episode. Coughing was inadequate in strength but vocal quality did improve following cough. Pt elicited a swallow reflex with the additional 2 trials of honey thick liquid, but reflex was delayed, 4 seconds in duration. No overt s/s of aspiration noted with the remaining 2 trials; however, decreased hyolarygneal elevation noted upon palpation. Patient denied further PO intake. Patient remains at high risk for aspiration due to decreased oropharyngeal swallow function, varying alertness, and inadequate coughing. Education provided to patient re: rationale for current NPO status and risks of aspiration.   Recommend continuation of NPO status with nutrition and hydration needs met via PEG tube. SHORT TERM GOAL #2:  Goal 2: Patient will complete advanced trials x10 without s/s of aspiration in order to safley determine safety of diet upgrade. INTERVENTIONS: See goal 1    SHORT TERM GOAL #3:  Goal 3: Monitor pulmonary status and vocing closely and complete instrumental evaluation as appropraite. INTERVENTIONS:  Pt with severely breathy and wet vocal quality upon arrival.  Vocal quality improved following coughing episode that was elicited during trials of honey thick liquid, but with low intensity and poor breath support. Continue to monitor appropriateness for further evaluation. SHORT TERM GOAL #4:  Goal 4: Pt will complete delayed recall and working memory tasks with 80% accuracy given mod cues to improve overall memory fucntion. INTERVENTIONS:DNT due to focus on swallowing goals    SHORT TERM GOAL #5:  Goal 5: Pt will complete thought organization and fucntional reasoning tasksw with 80% accuracy to improve independence for ADLs.   INTERVENTIONS: DNT due to focus on swallowing goals         ASSESSMENT:  Assessment: []Progressing towards goals          [x]Not Progressing towards goals       Patient Tolerance of Treatment:   []Tolerated well []Tolerated fair []Required rest breaks [x]Fatigued     Plan for Next Session: puree and honey thick trials    Education:  Learner:  [x]Patient          []Significant Other          []Other  Education provided regarding:  [x]Goals and POC   [x]Diet and swallowing precautions    []Home Exercise Program  []Progress and/or discharge information  Method of Education:  [x]Discussion          []Demonstration          []Handout          []Other  Evaluation of Education:   []Verbalized understanding   []Demonstrates without assistance  [x]Demonstrates with assistance  [x]Needs further instruction     []No evidence of learning                  [x]No family present      Plan: [x]Continue with current plan of care    []Modify current plan of care as follows:    []Discharge patient    Discharge Location:    Services/Supervision Recommended:     [x]Patient continues to require treatment by a licensed therapist to address functional deficits as outlined in the established plan of care.     Ana Claros Wei [address]

## 2017-10-12 NOTE — PROGRESS NOTES
[address] [address]  INPATIENT PHYSICAL THERAPY  DAILYNOTE  [facility] 4A    Time In: 5327  Time Out: 1050  Timed Code Treatment Minutes: 25 Minutes  Minutes: 25          Date: 10/12/2017  Patient Name: Juancarlos Ronquillo,  Gender:  female        MRN: [id number]  : 1935  (80 y.o.)     Referring Practitioner: Dr. Gustavo Cook  Diagnosis: carotid stenosis  Additional Pertinent Hx: admit with above diagnosis, ZAIRA, hypotension, left artery carotid setnosis s/p stenting     Past Medical History:   Diagnosis Date    Alcohol abuse     Carotid artery occlusion 2017    History of falling     Hyperlipidemia     Hypertension     Low back pain     Movement disorder     Muscle weakness (generalized)     Osteopenia     Scoliosis     lower lumbar Intervertebral Disc Displacement    Vitamin B-complex deficiency      Past Surgical History:   Procedure Laterality Date    HYSTERECTOMY      SHOULDER SURGERY Bilateral        Restrictions/Precautions:  Restrictions/Precautions: General Precautions, Fall Risk            Position Activity Restriction  Other position/activity restrictions: painful RUE         Subjective:     Subjective: Pt pleasant and cooeprative. Pt up to chair from OT session. OT recommended 2 person assist for return to bed. Pts  present but left room during activity. Pt particpating in conversation when directly questioned, required cues to speak more loudly and slow down to be understood, pt made sense in conversation approx 50% of time. Pt left in bed with 4A tech present to bathe pt. Pain:   .      right arm indicated- was positioned for max comfort thruout     Social/Functional:  Type of Home: Facility  Home Layout: One level     Objective:  Rolling to Left: Moderate assistance (once each direction for diaper removal and cleaning)  Rolling to Right: Moderate assistance (same- rails used, bed flat )  Sit to Supine:  Moderate assistance;Minimal assistance (min guidance down at shoulders; modA to lift legs into bed)    Transfers  Sit to Stand: Minimal Assistance (x2 from bs chair )  Stand to sit: Minimal Assistance (x2 to EOB )  Stand Pivot Transfers: Moderate Assistance x2 (pt taking very small shuffeling steps approx 2 feet from bs chair to EOB - more difficulty advancing right leg )                 Exercises:  Exercises  Comments: supine daniela LE therex; AAROM ; AP, heelslides, hip ABD/ADD, SLR and LTR all x 10 reps each with constant guidance and assist; pt did seem to be assisting approx 20% of time ; good range  with no tightness noted           Activity Tolerance:  Activity Tolerance: Patient Tolerated treatment well;Patient limited by fatigue;Patient limited by endurance    Assessment:   Body structures, Functions, Activity limitations: Decreased functional mobility , Decreased balance, Decreased ROM, Decreased strength, Decreased safe awareness, Decreased cognition, Decreased endurance, Decreased coordination  Assessment: pt with improved ability to follow commands and particpate this date; pt with O2 and IV line this date; per OT pt was heavy one assist to chair and this session pt was light-mod two person assist for return to bed after sitting up slightly longer than one hour, pt with excellent motivation and  present and supportive  Prognosis: Good  REQUIRES PT FOLLOW UP: Yes  Discharge Recommendations: [address], Continue to assess pending progress    Patient Education:  Patient Education: bed mobility  Barriers to Learning: cognition    Equipment Recommendations:  Equipment Needed: No    Safety:  Type of devices: Gait belt, Left in bed, Nurse notified, Bed alarm in place    Plan:  Times per week: 5X N  Times per day: Daily  Specific instructions for Next Treatment: therex and mobility, sitting balance, std pregait activity  Current Treatment Recommendations: Strengthening, ROM, Endurance Training, Equipment Evaluation, Education, & procurement, Cognitive Reorientation, Balance Training, Functional Mobility Training, Gait Training, Home Exercise Program, Transfer Training, Safety Education & Training, Patient/Caregiver Education & Training    Goals:  Patient goals : not stated, pt confused    Short term goals  Time Frame for Short term goals: 2 weeks  Short term goal 1: bed mobility with modAx1 to get in/out of bed  Short term goal 2: transfer with minAx1 to get in/out of chairs  Short term goal 3: amb 5'x1 with UE support and modAx1 to walk safely bed to/from chair    Long term goals  Time Frame for Long term goals : no LTGs set secondary to vin AMADOR    PT G-Codes  Functional Assessment Tool Used: professional judgement  Functional Limitation: Mobility: Walking and moving around  Mobility: Walking and Moving Around Current Status (): At least 60 percent but less than 80 percent impaired, limited or restricted  Mobility: Walking and Moving Around Goal Status ():  At least 40 percent but less than 60 percent impaired, limited or restricted

## 2017-10-13 NOTE — CARE COORDINATION
10/03/17 7:25 AM    Pt transferred to 4A06.  Handoff report given to OCHOA Ortiz CM
10/11/17 3:35 PM    Spoke with Mckenna Antoine; TCU admissions coordinator; states pre-cert approved for TCU. Bert states need to get her to 4A today so can go to [facility] on Friday. [facility] with Jameel French in [employer]; updated that pt had pre-cert approved for TCU but needs to get to 4A today so can go to TCU on Friday and patient has been here for 14 days. Jameel Plan states she will make a note for Jatinder Kebede to get top priority for bed on 4A.
10/12/17 7:32 AM    Pt transferred to 4A11.  Handoff report given to OCHOA Ortiz CM
10/6/17, 9:23 AM      [facility] day: 9  Location: -06/006-A Reason for admit: Carotid stenosis [I65.29]  Carotid stenosis with cerebral infarction less than 8 weeks ago [I69.30]  Carotid stenosis with cerebral infarction less than 8 weeks ago [I69.30]   Procedure: 9/26 Cerebral angiogram and L ICA stenting  9/26 Code Blue  9/26 Intubated  9/26 CVC  9/27 Extubated  9/27 Reintubated  9/27 BLE Arterial Dopplers  9/28 CT Head: SAH left frontal parietal occiptal and portions of temporal lobe; no obvious intraparenchymal hemorrhage; sulcal effacement of left cerebral hemisphere - no midline shift at this time, no herniation (Dr. Tomer Carter states is not SAH, but likely retained contrast)   9/28 TESSIO  9/28 CRRT w/citrate protocol started  10/01/17 Extubated  10/02/17 CVC catheter removed  10/03/17 Patient transferred to   Treatment Plan of Care: PT/OT/ST, Dr. Brannon Suarez following for Physiatry, Dr. Marisol Peñaloza following for Nephrology, Paul Mooney, and TCU Coordinator following. Palliative care consult placed per protocol. Patient withdrawing to treatment, refusing care, requiring suctioning. Core Measures: CVA  PCP: Mitzi Villafana MD  Discharge Plan: TCU vs. Returning to SNF.
9/29/17, 2:33 PM      [facility] day: 2  Location: -11/011-A Reason for admit: Carotid stenosis [I65.29]  Carotid stenosis with cerebral infarction less than 8 weeks ago [I69.30]  Carotid stenosis with cerebral infarction less than 8 weeks ago [I69.30]   Procedure:   9/26 Cerebral angiogram and L ICA stenting  9/26 Code Blue  9/26 Intubated  9/26 CVC  9/27 Extubated  9/27 Reintubated  9/27 BLE Arterial Dopplers  9/28 CT Head: SAH left frontal parietal occiptal and portions of temporal lobe; no obvious intraparenchymal hemorrhage; sulcal effacement of left cerebral hemisphere - no midline shift at this time, no herniation (Dr. Veronica Caro states is not SAH, but likely retained contrast)   9/28 TESSIO  9/28 CRRT w/citrate protocol started  Treatment Plan of Care: Extubated and reintubated on 9/27 d/t respiratory distress. CRRT started yesterday - removing fluid. Received 1 PRBC today. Remains on vent w/ETT on AC, peep 5, FIO2 25%, sats 96%. Follows commands except does not follow commands w/right hand. BLE all pulses now by doppler, LLE toes dusky. Critical Care, Neurology, and Cardiology following. CVS consulted for cold RLE. Dietitian following. Telemetry, I&O, neuro checks, n/v checks, wound care. Ca+ drip, Citrate drip for CRRT, precedex @ 0.2 mcg/kg/hr, dobutrex @ 5 mcg/kg/min, bicarb drip, asa, IV cefepime, epogen, plavix, IV pepcid, prn norco, prn IV morphine, Electrolyte replacement protocols. Received 1 dose IV vanc yesterday. Labs: Creat down to 1.3, Ical 0.97, Mg 1.5, phos 2, wbc 14.6, Hgb 7.3, plt 55. Core Measures: VTE  PCP: Sally Palma MD  Discharge Plan: Plan for return to [facility] at discharge. SW on case. **PT/OT signed off today and will need to be reordered once patient stable for therapy.
Discharge orders on chart. Met with Bandar Bey and her  present at bedside. Discussed discharge to TCU today. Both are in agreement for discharge to TCU today, Medicare Rights updated. 10/13/17, 12:03 PM    Discharge plan discussed by  and . Discharge plan reviewed with patient/ family. Patient/ family verbalize understanding of discharge plan and are in agreement with plan. Understanding was demonstrated using the teach back method.   IMM Letter  IMM Letter given to Patient/Family/Significant other/Guardian/POA/by[de-identified] Updated  IMM Letter date given[de-identified] 10/13/17  IMM Letter time given[de-identified] 7439
to return to [facility] as patient received nerve block and believes she may not need to return to resume therapy. SW explained to family that patient may need to return to continue therapy due complications of stenting, deconditioning from hospitalization, etc. Family verbalized understanding. Will continue to assess patient progress. PT/OT to be ordered. Patient is an [address] [place] [address] Dr. pre-cert and will need to be re-precerted if returning to facility.      Electronically signed by AUDIE Burger on 9/27/2017 at 11:49 AM
 propofol Stopped (09/27/17 1045)    sodium chloride 100 mL/hr at 09/26/17 0714    sodium chloride 125 mL/hr at 09/27/17 0336    DOPamine Stopped (09/27/17 0146)      Pertinent Info/Orders/Treatment Plan: Post-stenting in ICU, pt had drop in BP and found to be bleeding from right radial cath site with blood dripping off bed onto floor. Pressure was held. Just a couple minutes later, code blue was called - pt in PEA. Epi x1 and CPR - ROSC in 4 minutes. Intubated for agonal respirations. 2 units PRBC given. Dopamine started. LFA w/dopamine infiltrate. Lost pulses in evening in right foot - sheath pulled. Around midnight femoral sheath began bleeding - manual pressure held for 1 hour, SBP 60's and levo started. Extubated this am to 2L O2 with sats 97%. RLE dusky, cool, doppler Popliteal and DP pulse. LLE with doppler Popliteal and PT pulses. Low urine output this afternoon. NDIAYE x4 and follows commands. Critical Care, Neurology, and Cardiology following. CVS consulted for cold RLE. PT/OT. Dietitian following. Telemetry, I&O, neuro checks, n/v checks, wound care. IVF, asa, plavix, lovenox, prn norco, prn IV morphine. Labs: BUN 27, Creat 1 - now 1.5, Ical 1.1, Mg 1.5, LA 3.7 phos 5, procalcitonin 2.04, Trop neg then 0.013 then 0.082, alk phos 202, alt 10, wbc 38.3 - considered probably reactive, Hgb 14.1 - down to 9.3 - now 11.6. Respiratory and urine cultures sent. Diet:     DVT Prophylaxis: Lovenox  Smoking status:  reports that she quit smoking about 11 years ago. She does not have any smokeless tobacco history on file.    Influenza Vaccination Screening Completed: n/a  Pneumonia Vaccination Screening Completed: n/a  Core measures: VTE  PCP: Deejay Lombardi MD  Readmission:   no  Risk Score: 21.5     Discharge Planning  Current Residence:  Nursing Home  Living Arrangements:  Other (Comment)   Support Systems:  Family Members, Spouse/Significant Other  Current Services PTA:     Potential Assistance

## 2017-10-13 NOTE — PROGRESS NOTES
Other          []Other  Education provided regarding:  [x]Goals and POC   [x]Diet and swallowing precautions    []Home Exercise Program  []Progress and/or discharge information  Method of Education:  [x]Discussion          []Demonstration          []Handout          []Other  Evaluation of Education:   []Verbalized understanding   []Demonstrates without assistance  [x]Demonstrates with assistance  [x]Needs further instruction     []No evidence of learning                  [x]No family present      Plan: [x]Continue with current plan of care    []Modify current plan of care as follows:    []Discharge patient    Discharge Location:    Services/Supervision Recommended:     [x]Patient continues to require treatment by a licensed therapist to address functional deficits as outlined in the established plan of care.     Olimpia Ponce M.S. [facility] Grant

## 2017-10-13 NOTE — H&P
Does not apply, Weekly  [START ON 10/14/2017] tuberculin injection 5 Units, 5 Units, Intradermal, Weekly     Resident is taking an antipsychotic medication? No     If yes, was Gradual Dose Reduction (GDR) attempted? NA     No- GDR is clinically contraindicated due to the fact that tapering the medication  would not achieve the desired therapeutic effects and the current dose is  necessary to maintain or improve the resident's function, well-being, safety, and  quality of life. Social History:  Social History     Social History    Marital status:      Spouse name: N/A    Number of children: N/A    Years of education: N/A     Occupational History    Not on file.      Social History Main Topics    Smoking status: Former Smoker     Quit date: 7/5/2006    Smokeless tobacco: Not on file    Alcohol use No    Drug use: No    Sexual activity: Not on file     Other Topics Concern    Not on file     Social History Narrative    No narrative on file           Family History:       Problem Relation Age of Onset    Heart Disease Mother     Hypertension Mother     Heart Disease Father     Cancer Father      colon    Hypertension Sister     Hypertension Brother     Other Brother      myocardial infarction       Review of Systems:  CONSTITUTIONAL:  positive for  fatigue  EYES:  negative for  eye discharge  HEENT:  negative for  nasal congestion  RESPIRATORY:  negative for  dyspnea  CARDIOVASCULAR:  negative for  chest pain  GASTROINTESTINAL:  negative for nausea, vomiting, diarrhea and constipation  GENITOURINARY:  negative for dysuria  SKIN:  Diffuse ecchymoses  HEMATOLOGIC/LYMPHATIC:  positive for easy bruising  MUSCULOSKELETAL:  positive for  arthralgias, stiff joints, decreased range of motion and muscle weakness  NEUROLOGICAL:  positive for memory problems, speech problems, coordination problems, weakness and past stroke  BEHAVIOR/PSYCH:  negative for increased agitation  10 point system review

## 2017-10-13 NOTE — PROGRESS NOTES
level tile  Device: Hand-Held Assist (x2)  Assistance: Minimal assistance (x2)  Quality of Gait: Decreased but equal B step length with neither foot passing other. Decreased B foot clearance. Deminished B heel strike. Pt holding on to arm of therpist and arm of tech. Distance: 2 feet from bedside chair to EOB. Balance  Comments: Static sit up at edge of bedisde chair without trunk support ~3minutes while preparing to stand. Pt requiring contact guard assist for safety. Cuing to correct R lateral lean. Static stand with HHA x2    Exercises:  Exercises  Comments: Pt completed BLE ankle pumps x10 reps to increase strength for improved gait and transfers. Pt then urgently needed to use bedpan and requested time to go. Activity Tolerance:  Activity Tolerance: Patient Tolerated treatment well;Patient limited by fatigue;Patient limited by endurance    Assessment: Body structures, Functions, Activity limitations: Decreased functional mobility , Decreased balance, Decreased ROM, Decreased strength, Decreased safe awareness, Decreased cognition, Decreased endurance, Decreased coordination  Assessment: Pt tolerated session fair. Followed all commands. Min A of 2 for mobility. Assist of 2 for safety. Pt would greatly benefit from continued therapy for increased strength, endurance, and balance. Prognosis: Good  REQUIRES PT FOLLOW UP: Yes  Discharge Recommendations: [address], Continue to assess pending progress    Patient Education:  Patient Education: Transfer training. Barriers to Learning: cognition    Equipment Recommendations:  Equipment Needed: No    Safety:  Type of devices:  All fall risk precautions in place, Call light within reach, Gait belt, Left in bed, Nurse notified    Plan:  Times per week: 5X N  Times per day: Daily  Specific instructions for Next Treatment: therex and mobility, sitting balance, std pregait activity  Current Treatment Recommendations: Strengthening, ROM, Endurance Training, Equipment Evaluation, Education, & procurement, Cognitive Reorientation, Balance Training, Functional Mobility Training, Gait Training, Home Exercise Program, Transfer Training, Safety Education & Training, Patient/Caregiver Education & Training    Goals:  Patient goals : not stated, pt confused    Short term goals  Time Frame for Short term goals: 2 weeks  Short term goal 1: bed mobility with modAx1 to get in/out of bed  Short term goal 2: transfer with minAx1 to get in/out of chairs  Short term goal 3: amb 5'x1 with UE support and modAx1 to walk safely bed to/from chair    Long term goals  Time Frame for Long term goals : no LTGs set secondary to vin AMADOR    PT G-Codes  Functional Assessment Tool Used: professional judgement  Functional Limitation: Mobility: Walking and moving around  Mobility: Walking and Moving Around Current Status (): At least 60 percent but less than 80 percent impaired, limited or restricted  Mobility: Walking and Moving Around Goal Status ():  At least 40 percent but less than 60 percent impaired, limited or restricted

## 2017-10-13 NOTE — PLAN OF CARE
Problem: Falls - Risk of  Goal: Absence of falls  Outcome: Ongoing  Patient absent of falls this shift. Bed in lowest position, side rails up 2/4. Call-light within reach. Patient instructed to use call-light to get up. Non-skid footwear in place. Problem: Discharge Planning:  Goal: Discharged to appropriate level of care  Discharged to appropriate level of care    Outcome: Ongoing  Plan for TCU when stable. Problem: Pain:  Goal: Pain level will decrease  Pain level will decrease    Outcome: Ongoing  Patient able to use 0-10 pain scale. Denies pain at this time. Pain goal of \"keep comfortable\". Problem: Bleeding:  Goal: Will show no signs and symptoms of excessive bleeding  Will show no signs and symptoms of excessive bleeding     Outcome: Ongoing  Vitals stable. No signs of bleeding noted at this time. Will continue to monitor. Problem: Nutrition  Goal: Optimal nutrition therapy  Outcome: Ongoing  Jevity 1.5 running at 90 mL/hr through PEG tube from 6p-6a. Patient tolerating tube feeding at this time. Problem: Aspiration:  Goal: Absence of aspiration  Absence of aspiration    Outcome: Ongoing  HOB maintained above 30 degrees. Suction at bedside. Lung sounds with rhonchi throughout due to congestion. No signs of aspiration. Oxygen saturation maintained >90%. Problem: NEUROLOGICAL DEFICIT  Goal: Neurological status is stable or improving  Outcome: Ongoing  Patient is alert and appropriate. Oriented x2. Patient disoriented to time and situation. Speech remains clear. Patient soft spoken. No signs of aphasia/dysarthria. PERRL. No facial droop. No tongue deviation. Denies numbness/tingling. NIH stroke scale score 6. Continued neuro assessments. Comments: Care plan reviewed with patient. Patient verbalizes understanding of the plan of care and contributes to goal setting.

## 2017-10-13 NOTE — PROGRESS NOTES
Neurology Inpatient Progress Note:  Date of Service: 10/12/17  Chief Complaint: No chief complaint on file. ASSESSMENT AND PLAN:  1. L ICA stenting. Doing okay. At this point, she remains in the hospital for  general debility. Her respiratory status is improving   She was able to take 2-3 steps today.   -Plan on transfer to TCU in tomorrow.  -continue aspirin/plavix  -DVT prophylaxis  At least 25 minutes have been spent on the care of this patient. Greater than 50% of the floor time has been spent providing counseling/coordination of care with patient, family, and/or other providers/agencies involved with the patient's care. Rhianna Higginbotham M.D., J.D. Vascular Neurology and Endovascular Surgical Neuroradiology  Cell: [phone]      S: No acute events overnight. Neurologic status is improving. ROS was reviewed today and unchanged since last review. Namely, there are no new rashes, no new symptoms concerning for anaphylaxis, no new black/tarry stools, no new symptoms of temperature intolerance.     Medications:  Current Facility-Administered Medications: metoprolol tartrate (LOPRESSOR) tablet 25 mg, 25 mg, Oral, BID  lisinopril (PRINIVIL;ZESTRIL) tablet 5 mg, 5 mg, Oral, Daily  0.9 % sodium chloride infusion, , Intravenous, Continuous  diatrizoate meglumine-sodium (GASTROGRAFIN) 66-10 % solution 30 mL, 30 mL, PEG Tube, ONCE PRN  lidocaine viscous (XYLOCAINE) 2 % solution 5 mL, 5 mL, Nasal, PRN  famotidine (PEPCID) injection 10 mg, 10 mg, Intravenous, BID  miconazole (MICOTIN) 2 % powder, , Topical, BID  clopidogrel (PLAVIX) tablet 150 mg, 150 mg, Oral, Once  ipratropium-albuterol (DUONEB) nebulizer solution 1 ampule, 1 ampule, Inhalation, Q4H  sodium chloride flush 0.9 % injection 10 mL, 10 mL, Intravenous, Q12H  potassium chloride (KLOR-CON M) extended release tablet 40 mEq, 40 mEq, Oral, PRN **OR** potassium chloride 20 MEQ/15ML (10%) oral solution 40 mEq, 40 mEq, Oral, PRN **OR** potassium chloride 10 ALT 29 10/09/2017    AST 29 10/09/2017    BILITOT 0.3 10/09/2017    BILIDIR <0.2 09/30/2017    LABALBU 2.5 10/09/2017    LIPASE 144.5 11/09/2014    LIPASE 150.0 11/09/2014     Lab Results   Component Value Date    TROPONINI 0.007 07/06/2013      No results found for: LABA1C    No results found for: CHARCSF, CULTURE  No results found for: PHENYTOIN, CARBTOT, PHENOBARB, VALPROATE  No results found for: [facility]    Lab Results   Component Value Date    NITRU NEGATIVE 10/12/2017    COLORU DK YELLOW 10/12/2017    PHUR 6.0 10/12/2017    LABCAST NONE SEEN 10/05/2017    LABCAST NONE SEEN 10/05/2017    WBCUA 2-4 10/12/2017    RBCUA 0-2 10/12/2017    YEAST NONE SEEN 10/12/2017    BACTERIA FEW 10/12/2017    SPECGRAV 1.022 10/05/2017    LEUKOCYTESUR TRACE 10/12/2017    UROBILINOGEN 0.2 10/12/2017    BILIRUBINUR MODERATE 10/12/2017    BLOODU NEGATIVE 10/12/2017    GLUCOSEU 100 10/12/2017    KETUA TRACE 10/12/2017    AMORPHOUS URATES 10/12/2017

## 2017-10-14 NOTE — PROGRESS NOTES
[address] [address]  INPATIENT PHYSICAL THERAPY  EVALUATION  [facility]    Time In: 8845  Time Out: 4493  Timed Code Treatment Minutes: 15 Minutes  Minutes: 30          Date: 10/14/2017  Patient Name: Gil Miranda,  Gender:  female        MRN: [id number]  : 1935  (80 y.o.)  Referral Date : 10/13/17   Referring Practitioner: Ordering: Ivy Machuca MD; Attending: CHECO Lombardo MD  Diagnosis: Debility  Additional Pertinent Hx: Pt admitted to ICU on 17 following Left Internal Carotid Artery Stent Placement with Distal Embolic Protection Device Deployment per Dr. Gibsonmarly Chow is an 80 y.o. female admitted to the transitional care unit on 10/13/2017. She was admitted to [facility] to have a left internal carotid artery stent and then post procedure became hypotensive and had blood loss at the puncture sites. She stabilized and eventually was ready to come to [facility] for additional therapies. Past Medical History:   Diagnosis Date    Alcohol abuse     Carotid artery occlusion 2017    History of falling     Hyperlipidemia     Hypertension     Low back pain     Movement disorder     Muscle weakness (generalized)     Osteopenia     Scoliosis     lower lumbar Intervertebral Disc Displacement    Vitamin B-complex deficiency      Past Surgical History:   Procedure Laterality Date    HYSTERECTOMY      SHOULDER SURGERY Bilateral        Restrictions/Precautions:  Restrictions/Precautions: General Precautions, Fall Risk        Subjective:  Chart Reviewed: Yes  Patient assessed for rehabilitation services?: Yes  Subjective: Pt resting in bed and RN approved session and reported that pt had been seen as a Rapid Response around Midnight and therapy could see what she could participate with.     General:  Overall Orientation Status: Impaired  Orientation Level: Oriented to place, Disoriented to situation    Vision: Within Functional Limits    Hearing: Exceptions to [facility]  Hearing Exceptions: Hard of hearing/hearing concerns       Pain:  Denies. Social/Functional History:    Type of Home: Facility  Home Layout: One level         Ambulation Assistance: Needs assistance  Transfer Assistance: Needs assistance       Additional Comments: Per prior documentation, pt was at SNF for therapies having had a back injury. Pt had been Independent prior to the back injury and lived alone in a house. Objective:     RLE AROM: Exceptions  RLE General AROM: A/AAROM WFL     LLE AROM : Exceptions  LLE General AROM: A/AAROM WFL       Strength RLE: Exception  Comment: grossly 2-/5 to 3-/5, pt difficulty following commands for MMT    Strength LLE: Exception  Comment: grossly 3-/5 to 3/5, pt difficulty following commands for MMT       Sensation  Overall Sensation Status: WNL       Balance  Posture: Fair  Sitting - Static: Fair  Sitting - Dynamic: Fair  Standing - Static: Poor, +  Standing - Dynamic: Poor  Comments: Static and dynamic sitting at edge of bed without trunk support ~8 minutes while preparing to stand. Pt requiring close SBA to CGA for safety. Frequent cues to correct posture from either R lateral lean or an occasional L lateral lean. Rolling to Left: Moderate assistance  Rolling to Right: Moderate assistance  Supine to Sit: Moderate assistance (with bedrail and cues to reach)  Sit to Supine: Maximum assistance (for LEs onto bed)    Transfers  Sit to Stand: Minimal Assistance (+2 with verbal cues)  Stand to sit: Minimal Assistance (+ 2)  Stand Pivot Transfers: Minimal Assistance (+ 2 with verbal cues to advance feet and slight tactile cues for wt shifting)       Ambulation 1  Surface: level tile  Device: Hand-Held Assist (+ 2 )  Assistance: Minimal assistance (+ 2)  Quality of Gait: Limited foot clearance, needed tactile cues for wt shifting and vc to advance her feet.  Pt holding onto nurse and therapist throughout  Distance: 2 feet fromEOB to bedside chair       TCU Balance:      TCU BALANCE TEST: 0 1 2 8 Balance during to/from sit to stand   x    Balance during walking   x    Balance during turn-around and while walking    x   Balance moving on and off toilet    x   Balance during surface to/from surface transfers   x    0 = Steady at all times  1 = Not steady, but able to stabilize without human assistance  2 = Not steady, only able to stabilize with human assistance  8 = Activity did not occur         Exercises:  Comments: Pt able to perform daniela LE seated long arc quads with targets to hit, ankle pumps, and AAROM hip abd/add in reclined chair. Each x 10 reps for strengthening to improve functional mobility. Activity Tolerance:  Activity Tolerance: Patient limited by fatigue;Patient limited by endurance    Treatment Initiated: See exercises above     Assessment: Body structures, Functions, Activity limitations: Decreased functional mobility , Decreased balance, Decreased ROM, Decreased strength, Decreased safe awareness, Decreased cognition, Decreased endurance, Decreased coordination  Assessment: Pt is 80 y.o. female that has been intubated, extubated, reintubated and extubated and had CPR performed on her. Pt is very deconditioned and requires +2 person assist for transfers and Min A for bed mobility. Pt had been living at [facility] for therapies most recently before admission to [address] [place], [place] [id number] would benefit from continued skilled PT for strengthening, bed mobility training, balance training, transfers training, and possible progression to increased ambulation training. Prognosis: Good    Clinical Presentation: Moderate - Evolving with Changing Characteristics: age, debility, multiple intubations recently, CVA    Decision Making:  Moderate Complexity based on patient assessment and decision making process of determining plan of care and establishing reasonable expectations for measurable functional outcomes    REQUIRES PT FOLLOW UP: Yes  Discharge Recommendations: Continue to assess pending progress, Heather CMS G-Code Modifier : STU Munoz.  [place], [place] [address]

## 2017-10-14 NOTE — PROGRESS NOTES
IBW of 52.2 kg)  Estimated Intake vs Estimated Needs: Intake Meets Needs    Nutrition Risk Level: High    Nutrition Interventions:   Continue current Tube Feeding  Continued Inpatient Monitoring, Education not appropriate at this time    Nutrition Evaluation:   · Evaluation: Goals set   · Goals: TF to provide 75% of nutrient needs until able to tansistion to adequate po during LOS   · Monitoring: NPO Status, TF Intake, TF Tolerance, Skin Integrity, Ascites/Edema, Weight, Pertinent Labs, Chewing/Swallowing    See Adult Nutrition Doc Flowsheet for more detail.      Electronically signed by Justina Joy RD, LD on 10/14/17 at 8:35 AM    Contact Number: [phone]

## 2017-10-14 NOTE — PROGRESS NOTES
Code Stroke. Code stroke called on Ms. Neha Suarez. Nursing staff reported pt was aphasic with a R hemiplegia. There were also concerns regarding pt's respiratory status, and her peripheral vascular status. On examination:  NAD  Regular pulse rate. No JVD. No increased work of breathing, pt does intermittently use neck accessory muscles, but this is her baseline and is not a manifestation of respiratory distress. Her O2 saturation during evaluation was 98%. She has transmitted upper airway sounds from mucus/phlegm production that may sound like crackles, however, the sounds are most prominent on ascultation in the neck and in fact diminish in the lung fields (albeit still audible). Her extremities are warm on examination. She has present but admittedly difficult to palpate peripheral pulses. She has stable mottling of her lower extremities. Neurologically: A+Ox4, able to tell me Keny is President, No expressive aphasia  CN2-12 intact, no facial droop  4/5 R UE, able to wiggle R fingers; otherwise 5/5      A+P:  Elderly female w/ high grade symptomatic L ICA stenosis s/p carotid stenting w/ post-procedure \"cardiac arrest\" 2/2 hypotension stemming from infiltrated dopamine IV line and hemorrhage from radial arterial site. Pt was intubated, extubated, reintubated and finally extubated. During this prolonged stay, we have had an opportunity to experience the aforementioned concerns raised by [facility] staff. We have addressed each of these issues:   1. Transient worsening neurologic symptoms--especially at night.    -These are likely a manifestation of hospital delirium presenting w/ recrudescence of prior stroke symptoms. On my   evaluation, pt was at her neurologic baseline w/ minimal R upper extremity weakness and no aphasia. Pt does not have any   stigmata to suggest seizures. 2. Increased work of breathing/crackles. -Pt has full ability to protect her airway.  She has no signs or symptoms to suggest

## 2017-10-14 NOTE — PROGRESS NOTES
2020 Patient found to have pulled peg tube out. Attempted to call son, message left requesting return call. Dr. Leonor Dalal updated on assessment findings and Peg tube removal. Requested Dr. Pranav Villa be updated. Call placed to Dr. Pranav Villa. Informed of peg tube removal, vital signs, patient mental status, use of accessory muscles, decreased pulse ox and lung sounds. Order received  For dysphagia 1, honey thick diet, ok for peg tube to remain out, call respiratory for nasal suction. Dr. Pranav Villa states ok for heart rate to be lower and pulse ox ok with respiratory notified, Dr. Pranav Villa stated he spoke with family regarding code status and family originally agreed to DNR but changed their minds. Dr. Pranav Villa stated he told family patient may need trach to assist with recovery. Dr. Pranav Villa to call son to update on patient condition. 2040 Son, Chilango Michelle, returning call. Unaware of patient condition. Has not spoke with Dr. Pranav Villa at this time. Updated on situation and code status discussed. Chilango Michelle states the family does not want patient to be full code, and thought she was a [facility]. Chilango Michelle to call spouse to clarify and will return call to this writer with confirmation on code status. 2048 Chilango Michelle returning call, family does not want full code, requesting patient be a [facility] only. Dr. Pranav Villa notified and order received.

## 2017-10-14 NOTE — PROGRESS NOTES
Upon assessment, patient found to have increased respirations, audible gurgling with rhonchi  through out and use of accessory muscles. Vitals are WNL, see flowsheet. Dependent edema noted on bilat arms, rt greater than left. Bilat feet cool to touch with mottled toes, pedal pulses are present. Dr. Sy Loving notified of assessment findings and orders received for stat cbc, bmp, lasix 20mg IV push x 1 dose, stat chest x ray and aly cath placement. Prior to being able to carry out orders, patient becomes unresponsive with noted right facial droop and flaccid rt arm. Patient not answering questions and hard to rouse. Resource nurse present and suggests calling rapid response stroke related after chem stick checked and results are 112. Rapid response team arrives, assessments completed, after full assessment, it is decided to transfer patient to [facility]. Dr. Guicho Salcido arrives and cancelled orders that were given per Dr. Harley Latham and cancelled patient being discharged back to acute side. Dr. Patrick Peralta called per this writer and updated on events taking place. During conversation, Dr. Beach Liter requests to speak to Dr. Sy Loving. After which time Dr. Jake Moore explains patient symptoms have resolved so no need to discharge off unit. Rapid response then ended and patient continued to be monitored per TCU staff.

## 2017-10-14 NOTE — PROGRESS NOTES
Patient: Satnam Jon  Unit/Bed: 8E-70/070-A  YOB: 1935  MRN: [id number] Acct: [de-identified]   Admitting Diagnosis: Debility [R53.81]  Debility [R53.81]  Admit Date:  10/13/2017  Hospital Day: 1    Assessment:     Active Problems:    Benign hypertension    Cerebrovascular accident (CVA) due to stenosis of left carotid artery (HCC)    Chronic congestive heart failure with left ventricular diastolic dysfunction ([address]      Plan: Will check the port chest and abd series as she is less responsive today  Labs earlier today were acceptable  Insert aly  Await input from Dr Shivani Casey later today    I spoke with her son and  in the room today        Subjective:     Patient makes a garbled noise when I ask her how she is feeling  Medication side effects: none    Scheduled Meds:   metoprolol tartrate  25 mg PEG Tube Q8H    lansoprazole  15 mg PEG Tube QAM AC    aspirin  81 mg PEG Tube Daily    clopidogrel  75 mg PEG Tube Daily    chlorhexidine  15 mL Mouth/Throat 4x Daily    miconazole   Topical BID    [START ON 10/16/2017] ppd   Does not apply Weekly    tuberculin  5 Units Intradermal Weekly     Continuous Infusions:   PRN Meds:acetaminophen, HYDROcodone 5 mg - acetaminophen    Review of Systems  Pertinent items are noted in HPI. Objective:     Patient Vitals for the past 8 hrs:   BP Pulse   10/14/17 1029 139/74 74     No intake/output data recorded. No intake/output data recorded.     /74   Pulse 74   Temp 97.3 °F (36.3 °C) (Axillary)   Resp 24   Ht 5' 3\" (1.6 m)   SpO2 97%     /74   Pulse 74   Temp 97.3 °F (36.3 °C) (Axillary)   Resp 24   Ht 5' 3\" (1.6 m)   SpO2 97%   General appearance: appears stated age, cooperative, fatigued, no distress and pale  Lungs: upper airway sounds bilaterally  Heart: regular rate and rhythm, S1, S2 normal, no murmur, click, rub or gallop  Abdomen: soft, non-tender; bowel sounds normal; no masses,  no organomegaly  Extremities: extremities normal, atraumatic, no cyanosis or edema  Skin: Skin color, texture, turgor normal. No rashes or lesions  Neurologic: she seems markedly weak      Data Review:   Results for Ismael Dean (MRN [id number]) as of 10/14/2017 16:12   Ref.  Range 10/14/2017 00:06 10/14/2017 00:07 10/14/2017 06:28 10/14/2017 07:32 10/14/2017 12:21   Sodium Latest Ref Range: 135 - 145 meq/L 141   140    Potassium Latest Ref Range: 3.5 - 5.2 meq/L 4.6   5.1    Chloride Latest Ref Range: 98 - 111 meq/L 107   107    CO2 Latest Ref Range: 23 - 33 meq/L 26   25    BUN Latest Ref Range: 7 - 22 mg/dL 28 (H)   31 (H)    Creatinine Latest Ref Range: 0.4 - 1.2 mg/dL 0.7   0.7    Anion Gap Latest Ref Range: 8.0 - 16.0 meq/L 8.0   8.0    Est, Glom Filt Rate Latest Units: ml/min/1.73m2 80 (A)   80 (A)    Glucose Latest Ref Range: 70 - 108 mg/dL 105   124 (H)    POC Glucose Latest Ref Range: 70 - 108 mg/dl   160 (H)  110 (H)   Calcium Latest Ref Range: 8.5 - 10.5 mg/dL 8.0 (L)   8.3 (L)    WBC Latest Ref Range: 4.8 - 10.8 thou/mm3  8.6  12.7 (H)    RBC Latest Ref Range: 4.20 - 5.40 mill/mm3  2.59 (L)  2.99 (L)    Hemoglobin Quant Latest Ref Range: 12.0 - 16.0 gm/dl  8.1 (L)  9.1 (L)    Hematocrit Latest Ref Range: 37.0 - 47.0 %  24.6 (L)  27.9 (L)    MCV Latest Ref Range: 81.0 - 99.0 fL  94.8  93.2    MCH Latest Ref Range: 27.0 - 31.0 pg  31.2 (H)  30.3    MCHC Latest Ref Range: 33.0 - 37.0 gm/dl  32.9 (L)  32.5 (L)    MPV Latest Ref Range: 7.4 - 10.4 mcm  7.8  8.0    RDW Latest Ref Range: 11.5 - 14.5 %  17.4 (H)  16.8 (H)    Platelet Count Latest Ref Range: 130 - 400 thou/mm3  283  298    Lymphocytes # Latest Ref Range: 1.0 - 4.8 thou/mm3  0.6 (L)      Monocytes # Latest Ref Range: 0.4 - 1.3 thou/mm3  1.1      Eosinophils # Latest Ref Range: 0.0 - 0.4 thou/mm3  0.3      Basophils # Latest Ref Range: 0.0 - 0.1 thou/mm3  0.1      Seg Neutrophils Latest Units: %  75.5      Segs Absolute Latest Ref Range: 1.8 - 7.7 thou/mm3  6.5

## 2017-10-15 NOTE — PROGRESS NOTES
rehabilitation services?: Yes    Subjective: First attempt pt asleep in bed, unable to arouse even with sternal rub, RN aware. Second trial in pm pt very lethargic and in bed, at first not resonding to therapist, eventually able to make eye contact and nodded to wanting to try to sit at EOB. Comments: pt only responding to therapist 25% of the time, noted to come in and out of alertness during session. General:       Vision:  (pt with poor eye contact )    Hearing: Exceptions to [facility]  Hearing Exceptions: Hard of hearing/hearing concerns         Pain:  Pain Assessment  Patient Currently in Pain:  (pt would not state when asked)       Social/Functional:  Type of Home: Facility  Home Layout: One level                        Ambulation Assistance: Needs assistance  Transfer Assistance: Needs assistance       Additional Comments: Per prior documentation, pt was at SNF for therapies having had a back injury. Pt had been Independent prior to the back injury and lived alone in a house.  unable to get any information during eval d/t no family present     Objective        Cognition Comment: very slow processing, decreased alertness, high lethargy, follows commands 25% of the time with repetition         Sensation  Overall Sensation Status:  (not able to assess, pt would not respond when moving RUE, however did assist therapist during LUE ROM)                   LUE PROM (degrees)  LUE PROM: WFL  LUE General PROM: pt would assist with therapist to complete to 90 degrees                  RUE AROM (degrees)  RUE General AROM: no active movement or engagment of muscles this date during shoulder flexion, tolerated PROM for shoulder flexion to 90 degrees in supine, noted slight engagement of extensors when completing shoulder extension       LUE Strength  L Hand Grasp: 3+/5  LUE Strength Comment: AAROM to 90 degrees, very deconditioned                 RUE Strength  RUE Strength Comment: pt not following commands or attempting to completed, see above for more details. Pt very lethargic and only following commands 25 % of the time, completed bed mobility with max A and tolerated EOB X 5 minutes this date needing mod A- min A for brief periods with BUE support on EOB, noted pt to leaning to right side initially and occasionally to the left side. Pt returned to bed d/t eye continually closing. Assessment:  Assessment: pt with poor tolerance of treatment this date requiring max A for bed mobility and only tolerated sitting at EOB X 5 minutes needing max A - mod A for balance. pt unable to engage in any ADL this date requiring max A. Pt would rest head on therapist needing physical assist to sit up at midline. Pt presents with above deficits and will continue to need OT services to improve strength, activity tolerance and balance needed for simple ADL tasks   Performance deficits / Impairments: Decreased functional mobility , Decreased ADL status, Decreased endurance, Decreased ROM, Decreased strength, Decreased safe awareness, Decreased cognition, Decreased balance  Prognosis: Fair  Discharge Recommendations: Continue to assess pending progress    Clinical Decision Making: Clinical Decision making was of High Complexity as the result of analysis of data from a comprehensive assessment, the presence of comorbidities affecting the plan of care and the need for signficant modifications or assistance required to complete the evaluation. Patient Education:  Patient Education: sitting EOB , role of OT, ADLs, bed mobility    Equipment Recommendations:   Other: Continue to assess    Safety:  Safety Devices in place: Yes  Type of devices: Left in bed, Nurse notified, Call light within reach, All fall risk precautions in place (RN in room at end of session, changing catheter)    Plan:  Times per week: 6x  Current Treatment Recommendations: Balance Training, Functional Mobility Training, Endurance Training, Self-Care / ADL, Safety Education & Training, Patient/Caregiver Education & Training    Goals:  Patient goals : Pt did not state    Short term goals  Time Frame for Short term goals: 1 week  Short term goal 1: Pt will tolerate sitting at EOB X 5 minutes with min A at midline while completing functional task to increase engagement with EOB ADLs  Short term goal 2: Complete BUE gentle AROM/WB exercises to increase AROM BUE for ease of grooming  Short term goal 3: Follow 75% of simple commands to increase participation in ADL tasks  Short term goal 4: Pt will tolerate further assessment of transfers and mobility with OTR when appropriate  Long term goals  Time Frame for Long term goals : 4 weeks  Long term goal 1: Pt will complete functional transfers with min A and min cues for technique for increased ease with toileting routine  Long term goal 2: Pt will tolerate dynamic standing task for 3-5 minutes with 1-2 hand release and min A for increased ease with toilet hygiene and grooming tasks   Long term goal 3: Pt will complete simple grooming tasks with s/u asisstance from w/c level for increased I with self care    Evaluation Complexity: Based on the findings of patient history, examination, clinical presentation, and decision making during this evaluation, this patient is of high complexity. OT G-codes  Functional Assessment Tool Used: professional judgement  Functional Limitation: Self care  Self Care Current Status (): At least 80 percent but less than 100 percent impaired, limited or restricted  Self Care Goal Status ():  At least 40 percent but less than 60 percent impaired, limited or restricted

## 2017-10-15 NOTE — PLAN OF CARE
Problem: Pain:  Goal: Pain level will decrease  Pain level will decrease   Outcome: Ongoing  Assess barriers to pain control. Problem: Bleeding:  Goal: Will show no signs and symptoms of excessive bleeding  Will show no signs and symptoms of excessive bleeding   Outcome: Ongoing  Bleeding precautions. Problem: Fluid Volume - Imbalance:  Goal: Absence of imbalanced fluid volume signs and symptoms  Absence of imbalanced fluid volume signs and symptoms   Outcome: Ongoing  Monitor intake and output. Problem: Mobility - Impaired:  Goal: Mobility will improve  Mobility will improve   Outcome: Ongoing  Assess barriers to increased activity. Problem: Nutrition  Goal: Optimal nutrition therapy  Outcome: Ongoing  Encourage patient to consume % of all meals. Problem: Falls - Risk of  Goal: Absence of falls  Outcome: Ongoing  Fall precautions. Problem: Risk for Impaired Skin Integrity  Goal: Tissue integrity - skin and mucous membranes  Structural intactness and normal physiological function of skin and  mucous membranes. Skin assessement every shift and as needed.

## 2017-10-15 NOTE — PROGRESS NOTES
Patient resting in bed. Patient pulled peg tube out last night. Nurse attempted this morning to feed patient honey thick liquids. Patient did take 3 small bites and was done. Rizo catheter in place. No urine output noted last shift. Physical therapy was in to see patient. Patient unable to participate in therapy. Nurse called one-2-one to speak to Dr. Veronica Caro about plan of care. Awaiting call back at this time.

## 2017-10-16 VITALS
OXYGEN SATURATION: 100 % | TEMPERATURE: 97.2 F | BODY MASS INDEX: 24.89 KG/M2 | WEIGHT: 140.5 LBS | DIASTOLIC BLOOD PRESSURE: 53 MMHG | SYSTOLIC BLOOD PRESSURE: 120 MMHG | HEIGHT: 63 IN | HEART RATE: 57 BPM | RESPIRATION RATE: 33 BRPM

## 2017-10-16 PROCEDURE — 0220000000 HC SKILLED NURSING FACILITY

## 2017-10-16 NOTE — DISCHARGE SUMMARY
98 - 111 meq/L 107      CO2 Latest Ref Range: 23 - 33 meq/L 25      BUN Latest Ref Range: 7 - 22 mg/dL 31 (H)      Creatinine Latest Ref Range: 0.4 - 1.2 mg/dL 0.7      Anion Gap Latest Ref Range: 8.0 - 16.0 meq/L 8.0      Est, Glom Filt Rate Latest Units: ml/min/1.73m2 80 (A)      Glucose Latest Ref Range: 70 - 108 mg/dL 124 (H)      POC Glucose Latest Ref Range: 70 - 108 mg/dl  110 (H) 97    Calcium Latest Ref Range: 8.5 - 10.5 mg/dL 8.3 (L)      WBC Latest Ref Range: 4.8 - 10.8 thou/mm3 12.7 (H)      RBC Latest Ref Range: 4.20 - 5.40 mill/mm3 2.99 (L)      Hemoglobin Quant Latest Ref Range: 12.0 - 16.0 gm/dl 9.1 (L)      Hematocrit Latest Ref Range: 37.0 - 47.0 % 27.9 (L)      MCV Latest Ref Range: 81.0 - 99.0 fL 93.2      MCH Latest Ref Range: 27.0 - 31.0 pg 30.3      MCHC Latest Ref Range: 33.0 - 37.0 gm/dl 32.5 (L)      MPV Latest Ref Range: 7.4 - 10.4 mcm 8.0      RDW Latest Ref Range: 11.5 - 14.5 % 16.8 (H)      Platelet Count Latest Ref Range: 130 - 400 thou/mm3 298      URINE WITH REFLEXED MICRO Unknown    Rpt (A)   Color, UA Latest Ref Range: STRAW-YELL     YELLOW   Glucose, UA Latest Ref Range: NEGATIVE mg/dl    NEGATIVE   Bilirubin, Urine Latest Ref Range: NEGATIVE     NEGATIVE   Ketones, Urine Latest Ref Range: NEGATIVE     NEGATIVE   Blood, Urine Latest Ref Range: NEGATIVE     NEGATIVE   pH, UA Latest Ref Range: 5.0 - 9.0     5.0   Protein, UA Latest Ref Range: NEGATIVE     NEGATIVE   Urobilinogen, Urine Latest Ref Range: 0.0 - 1.0 eu/dl    0.2   Nitrite, Urine Latest Ref Range: NEGATIVE     NEGATIVE   Leukocyte Esterase, Urine Latest Ref Range: NEGATIVE     NEGATIVE   Casts UA Latest Ref Range: NONE SEEN /lpf    0-4 HYALINE   CASTS 2 Latest Ref Range: NONE SEEN /lpf    NONE SEEN   WBC, UA Latest Ref Range: 0-4/hpf /hpf    0-2   RBC, UA Latest Ref Range: 0-2/hpf /hpf    NONE   Epi Cells Latest Ref Range: 3-5/hpf /hpf    0-2   Renal Epithelial, Urine Latest Ref Range: NONE SEEN     NONE   Bacteria, UA

## 2017-10-16 NOTE — SIGNIFICANT EVENT
[address] [place] [address]  Notice of Patient Passing      Patient Name- Bharathi Arias Number- [de-identified]   Attending Physician- Flora Leonard MD    Admitted on-10/13/2017  4:50 PM     On 10/15/17 at 2337 patient was found in [phone] with:   Absence of vital signs. Absence of neurological response. Confirmed time of death at 0. Physician or On-call Physician notified of time of death- yes, Dr. Stefania Colmenares and Dr. Brittney Huerta notified    Family present at time of death- no   Spiritual care present at time of death- no    Physician was notified and orders were obtained to release the body. Post-Mortem documentation completed; form printed, signed, and given to admitting.     Marla Kolb RN Nursing Supervisor/ Manager  10/16/17   12:17 AM    ________________________________________________________________________

## 2017-10-16 NOTE — PROGRESS NOTES
Family leaving facility, personal belongings sent home with son, glasses sent home with daughter. Lincoln Hernandez, house supervisor notified that family leaving facility. Alisha to call  home.

## 2017-10-16 NOTE — PROGRESS NOTES
Note entered in delayed fashion. Pt seen and examined. Had d/w family today re: tracheostomy. They will decide in AM.  Please feel free to call with any questions. Nancy Spicer M.D., J.D.   Vascular Neurology and Endovascular Surgical Neuroradiology  Cell Number: [phone]

## 2017-10-16 NOTE — PROGRESS NOTES
At 2337 patient found without vital signs, absence of heart rate and respirations verified by 2 nurses. House supervisor notified, son Donis Cline called, Dr. Debbie Archer notified and order received to release body. Dr. Matt Allen notified. Family en route. Post mortem care completed.

## 2017-10-16 NOTE — FLOWSHEET NOTE
Dr Imani Hernandez notified of bladder spasms, increased anxiety and facial grimace with labored respirations. Oreder for Morphine IVpush 1 mg 1 x dose and prn melatonin 3mg nightly for sleep. Call with update if needed.

## 2017-11-06 NOTE — DISCHARGE SUMMARY
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 [[id number]]  Discharge Diagnosis: Same  Discharge Medications:   Jaime Saez [facility] Medication Instructions NJY:[id number]    Printed on:11/05/17 1846   Medication Information                      acetaminophen (TYLENOL) 325 MG tablet  Take 650 mg by mouth every 6 hours as needed for Pain             amLODIPine (NORVASC) 2.5 MG tablet  Take 2.5 mg by mouth daily             aspirin 81 MG chewable tablet  Take 81 mg by mouth daily             Calcium Carbonate (CALTRATE 600 PO)  Take 1 tablet by mouth daily             clopidogrel (PLAVIX) 75 MG tablet  Take 1 tablet by mouth daily             docusate sodium (COLACE) 100 MG capsule  Take 100 mg by mouth daily as needed for Constipation             lidocaine (LIDODERM) 5 %  Place 1 patch onto the skin daily 12 hours on, 12 hours off.             loratadine (CLARITIN) 10 MG capsule  Take 10 mg by mouth daily as needed              metoprolol succinate (TOPROL XL) 25 MG extended release tablet  Take 25 mg by mouth daily             omeprazole (PRILOSEC) 40 MG delayed release capsule  Take 40 mg by mouth daily                   History of Present Illness: Please see HPI from History and Physical Note. Brief Hospital Course:  Pt was admitted electively for stenting of L ICA which demonstrated high grade stenosis. Pt underwent procedure without complications, although she was intubated mid way through procedure due to her inability to lay still while undergoing the procedure. Her L ICA was successfully stented. She was successfully extubated w/o issues. She was doing well.  However, in the evening time on the day of surgery,

## 2020-08-27 NOTE — PROGRESS NOTES
Neurology Inpatient Progress Note:  Date of Service: 10/04/17  Chief Complaint: No chief complaint on file. ASSESSMENT AND PLAN:  1. L ICA successful stenting. Post-operatively pt noted to be hypotensive and there was concern for shock. Pt was transfused and reintubated. She was finally successfully extubated. She is doing well. Continue current care. Pre cert for TCU approved. Appreciate this. Continue aspirin/plavix  -DVT prophylaxis  At least 25 minutes have been spent on the care of this patient. Greater than 50% of the floor time has been spent providing counseling/coordination of care with patient, family, and/or other providers/agencies involved with the patient's care. Bety Toth M.D., J.D. Vascular Neurology and Endovascular Surgical Neuroradiology  Cell: [phone]      S: No acute events overnight. Neurologic status is better. ROS was reviewed today and unchanged since last review. Namely, there are no new rashes, no new symptoms concerning for anaphylaxis, no new black/tarry stools, no new symptoms of temperature intolerance.     Medications:  Current Facility-Administered Medications: 0.9 % sodium chloride infusion, , Intravenous, Continuous  DOBUTamine (DOBUTREX) 500 mg in dextrose 5 % 250 mL infusion, 2.5 mcg/kg/min (Order-Specific), Intravenous, Continuous  sodium chloride flush 0.9 % injection 10 mL, 10 mL, Intravenous, Q12H  0.9 % sodium chloride infusion, , Intravenous, Continuous  potassium chloride (KLOR-CON M) extended release tablet 40 mEq, 40 mEq, Oral, PRN **OR** potassium chloride 20 MEQ/15ML (10%) oral solution 40 mEq, 40 mEq, Oral, PRN **OR** potassium chloride 10 mEq/100 mL IVPB (Peripheral Line), 10 mEq, Intravenous, PRN  potassium chloride 20 mEq/50 mL IVPB (Central Line), 20 mEq, Intravenous, PRN  epoetin natalia (EPOGEN;PROCRIT) injection 1,000 Units, 1,000 Units, Subcutaneous, Once per day on Tue Thu Sat  propofol 1000 MG/100ML injection, 10 mcg/kg/min, Intravenous, Titrated  calcium replacement protocol, , Other, RX Placeholder  magnesium replacement protocol, , Other, RX Placeholder  phosphorus replacement protocol, , Other, RX Placeholder  chlorhexidine (PERIDEX) 0.12 % solution 15 mL, 15 mL, Mouth/Throat, 4x Daily  famotidine (PEPCID) injection 10 mg, 10 mg, Intravenous, BID  aspirin chewable tablet 81 mg, 81 mg, Oral, Once  clopidogrel (PLAVIX) tablet 75 mg, 75 mg, Oral, Once  sodium chloride flush 0.9 % injection 10 mL, 10 mL, Intravenous, PRN  acetaminophen (TYLENOL) tablet 650 mg, 650 mg, Oral, Q4H PRN  HYDROcodone-acetaminophen (NORCO) 5-325 MG per tablet 1 tablet, 1 tablet, Oral, Q4H PRN  ondansetron (ZOFRAN) injection 4 mg, 4 mg, Intravenous, Q8H PRN  iohexol (OMNIPAQUE 180) injection 20 mL, 20 mL, Intravenous, ONCE PRN  morphine (PF) injection 6 mg, 6 mg, Intravenous, Q4H PRN  0.9 % sodium chloride bolus, 250 mL, Intravenous, Once    O:   Vitals:    10/04/17 1151   BP: (!) 182/77   Pulse: 61   Resp: 16   Temp: 98.2 °F (36.8 °C)   SpO2: 99%     NAD   NC/AT  No scleral icterus  No increased work of breathing  NT/ND/+BS  No clubbing/cyanosis  Neuro: A+Ox4  CN 2-12 intact   Moves all extremities w/ antigravity strength  Sensory/Reflexes unchanged      Labs and imaging have been personally reviewed.   Lab Results   Component Value Date    WBC 11.6 10/03/2017    HGB 9.8 10/03/2017    HCT 29.3 10/03/2017    MCV 90.3 10/03/2017     10/03/2017     Lab Results   Component Value Date     10/04/2017    K 3.6 10/04/2017     10/04/2017    CO2 30 10/04/2017    PHOS 3.4 10/02/2017    BUN 39 10/04/2017    CREATININE 1.0 10/04/2017    CALCIUM 8.1 10/04/2017     Lab Results   Component Value Date    INR 0.97 09/30/2017     Lab Results   Component Value Date    APTT 32.5 09/30/2017     Lab Results   Component Value Date    ALKPHOS 104 09/30/2017    ALT 42 09/30/2017    AST 80 09/30/2017    BILITOT 0.4 09/30/2017    BILIDIR <0.2 09/30/2017    LABALBU 2.0 09/30/2017    LIPASE 144.5 11/09/2014    LIPASE 150.0 11/09/2014     Lab Results   Component Value Date    TROPONINI 0.007 07/06/2013      No results found for: LABA1C    No results found for: CHARCSF, CULTURE  No results found for: PHENYTOIN, CARBTOT, PHENOBARB, VALPROATE  No results found for: [facility]    Lab Results   Component Value Date    NITRU NEGATIVE 09/26/2017    COLORU YELLOW 09/26/2017    PHUR 6.0 09/26/2017    LABCAST NONE SEEN 09/26/2017    LABCAST NONE SEEN 09/26/2017    WBCUA 2-4 09/26/2017    RBCUA 0-2 09/26/2017    YEAST NONE SEEN 09/26/2017    BACTERIA NONE 09/26/2017    SPECGRAV >1.030 09/26/2017    LEUKOCYTESUR NEGATIVE 09/26/2017    LEUKOCYTESUR TRACE 11/09/2014    UROBILINOGEN 0.2 09/26/2017    BILIRUBINUR NEGATIVE 09/26/2017    BLOODU NEGATIVE 09/26/2017    KETUA NEGATIVE 09/26/2017 Abdominal Pain, N/V/D

## 2021-02-22 NOTE — PROGRESS NOTES
Wrist splint for 1 week.  Apply ice packs for 15 minutes every 3 hours as needed.  Ibuprofen 600 mg with food every 8 hours as needed for pain.  Tylenol as needed for pain.  Avoid strenuous physical work and repetitive work in the right hand for 1 week.  If worsening pain or notice swelling or bruising or numbness or tingling in the hand or difficulty using hand .  If no good improvement in 5-7 days follow-up with your primary care physician.    Patient Education     Tendonitis  A tendon is the thick fibrous cord that joins muscle to bone and allows joints to move. When a tendon becomes inflamed, it is called tendonitis. This can occur from overuse, injury, or infection. This usually involves the shoulders, forearm, wrist, hands and foot. Symptoms include pain, swelling and tenderness to the touch. Moving the joint increases the pain.  It takes 4 to 6 weeks for tendonitis to heal. It is treated by preventing motion of the tendon with a splint or brace and the use of anti-inflammatory medicine.  Home care  · Some people find relief with ice packs. These can be crushed or cubed ice in a plastic bag or a bag of frozen vegetables wrapped in a thin towel. Other people get better relief with heat. This can include a hot shower, hot bath, or a moist towel warmed in a microwave. Try each and use the method that feels best, for 15 to 20 minutes several times a day.  · Rest the inflamed joint and protect it from movement.  · You may use over-the-counter ibuprofen or naproxen to treat pain and inflammation, unless another medicine was prescribed. If you can't take these medicines, acetaminophen may help with the pain, but does not treat inflammation. If you have chronic liver or kidney disease or ever had a stomach ulcer or gastrointestinal bleeding, talk with your doctor before using these medicines.  · As your symptoms improve, begin gradual motion at the involved joint.  Follow-up care  Follow up with your healthcare  provider if you are not improving after 5 days of treatment.  When to seek medical advice  Call your healthcare provider right away if any of these occur:  · Redness over the painful area  · Increasing pain or swelling at the joint  · Fever (1 degree above your normal temperature) lasting 24 to 48 hours Or, whatever your healthcare provider told you to report based on your condition  Date Last Reviewed: 11/21/2015  © 2795-7124 [employer]. [address] [place] [address], [place] [address]. All rights reserved. This information is not intended as a substitute for professional medical care. Always follow your healthcare professional's instructions.            difficulty noted with pt fatiguing quickly, pt RUE propped on pillows at end of session                Activity Tolerance:  Activity Tolerance: Patient Tolerated treatment well;Patient limited by fatigue  Activity Tolerance: very slow processing, able to follow simple 1 step commands with repetition 75% of the time    Assessment:     Performance deficits / Impairments: Decreased functional mobility , Decreased ADL status, Decreased endurance, Decreased ROM, Decreased strength, Decreased safe awareness, Decreased cognition, Decreased balance  Prognosis: Fair  Discharge Recommendations: Continue to assess pending progress    Patient Education:  Patient Education: OT role, POC, safety with  mobility, WB, transfer safety   Barriers to Learning: decreased cognition    Equipment Recommendations:  Equipment Needed: No  Other: Continue to assess    Safety:  Safety Devices in place: Yes  Type of devices: All fall risk precautions in place, Left in chair, Call light within reach, Nurse notified, Chair alarm in place, Gait belt (Rn in room to assist with feeding at end of session. pt with dificulty raising RUE)    Plan:  Times per week: 5x  Current Treatment Recommendations: Balance Training, Functional Mobility Training, Endurance Training, Self-Care / ADL, Safety Education & Training, Patient/Caregiver Education & Training    Goals:  Patient goals : Pt did not state    Short term goals  Time Frame for Short term goals: 1 week  Short term goal 1: Complete various stand-pivot t/f to [facility] or bedside chair with 0>mod A x 1  Short term goal 2: Complete 1 min standing with min A & min vcs for posture  Short term goal 3: Follow 75% of simple commands to increase participation in ADL tasks  Short term goal 4: Complete BUE gentle AROM exercises to increase AROM BUE for ease of grooming  Short term goal 5:  Tolerate further assessment of ambulation when appropriate  Long term goals  Time Frame for Long term goals : No LTG set d/t short ELOS

## 2024-03-06 NOTE — PROGRESS NOTES
Geriatric Medicine Consultation  Date of Service 3/5/2024    Referring Physician  Kamran Muñoz M.D.    Consulting Physician  Jt Patten M.D.    Reason for Consultation  Fall and dementia    History of Presenting Illness    76-year-old female with history of dementia, hypertension and dyslipidemia who presented 3/4 with fall.  Patient was not able to provide history due to dementia, patient lives with her family, patient missed a step and landed on a corner.  No significant loss of consciousness, no significant chest pain.  Patient was found to have facial fractures and small subdural hematoma, patient was admitted by trauma service.    Patient has moderate to advanced dementia, alert to herself only, lives with her family, and her , who is the caregiver, family planning to discharge the patient home initially for 1 day and then group home.    Review of Systems  Review of Systems   Unable to perform ROS: Dementia       Past Medical History   has a past medical history of High cholesterol (3/4/2024), Hypertension (3/4/2024), Small intestinal hemorrhage not requiring more than four units of blood in 24 hours, admission to ICU, or surgery (2018), and Stomach ulcer.    She has no past medical history of Breast cancer (HCC).    Surgical History   has a past surgical history that includes hysterectomy laparoscopy (1994); septoplasty (4/26/2010); turbinate reduction (4/26/2010); septoplasty (10/7/2010); pr breast reduction; irrigation & debridement ortho (Right, 7/21/2015); hardware removal ortho (7/21/2015); irrigation & debridement ortho (Right, 7/23/2015); orif, patella (7/23/2015); gastroscopy-endo (6/16/2018); colonoscopy - endo (N/A, 6/18/2018); and orif, ankle (Right, 1/22/2019).    Family History  family history includes Cancer in an other family member; Hypertension in an other family member.    Social History   reports that she has never smoked. She has never used smokeless tobacco. She reports  Pt intubated by this RCP with 8.5 ETT on first attempt. Bilateral breath sounds heard, positive color change noted on end tidal, and fog in tube. Secured at the 23 cm from bottom lip. Pt tolerated procedure well. current alcohol use. She reports that she does not use drugs.    Living situation -   Marital status -   Primary caregiver -   Educational level -  Transportation -  [employer]                   No   Health care POA   No    Financial POA       No      Medications  Prior to Admission Medications   Prescriptions Last Dose Informant Patient Reported? Taking?   Cholecalciferol (VITAMIN D-3 SUPER STRENGTH) 2000 UNIT Tab UNK at UNK Significant Other Yes Yes   Sig: Take 2,000 Units by mouth every day.   FLUoxetine (PROZAC) 20 MG Cap 3/4/2024 at AM Significant Other Yes Yes   Sig: Take 20 mg by mouth every day.   GEMTESA 75 MG tablet 3/3/2024 at PM Significant Other Yes Yes   Sig: Take 75 mg by mouth every evening.   Multiple Vitamins-Minerals (MULTIVITAMIN ADULTS) Tab 3/3/2024 at PM Significant Other Yes Yes   Sig: Take 1 Tablet by mouth every evening.   QUEtiapine (SEROQUEL) 25 MG Tab UNK at UNK Significant Other Yes No   Sig: Take 25 mg by mouth at bedtime.   denosumab (PROLIA) 60 MG/ML Solution UNK at UNK Significant Other Yes No   Sig: Inject 60 mg as instructed Once. Every 6 months   hydrOXYzine HCl (ATARAX) 25 MG Tab 3/4/2024 at AM Significant Other Yes Yes   Sig: Take 25 mg by mouth 4 times a day as needed.   lisinopril (PRINIVIL) 20 MG Tab UNK at UNK Significant Other Yes No   Sig: Take 20 mg by mouth every day.   omeprazole (PRILOSEC) 20 MG delayed-release capsule 3/4/2024 at AM Significant Other Yes Yes   Sig: Take 20 mg by mouth every day.   rosuvastatin (CRESTOR) 20 MG Tab 3/3/2024 at PM Significant Other Yes Yes   Sig: Take 20 mg by mouth at bedtime.      Facility-Administered Medications: None       Allergies  Allergies   Allergen Reactions    Nkda [No Known Drug Allergy]        Geriatric Screening    ADL assistance              Yes  IADL assistance             Yes  Cognitive Impairment    Yes  Mood disorder                Yes  Polypharmacy                No   Vision impairment         Yes  Hearing impairment       Yes  Fall                                  Yes  Assistive device            Yes  Urinary incontinence    Yes  Nutrition issues            Yes  Food Insecurity            No   Pressure ulcer              No     Physical Exam  Temp:  [36 °C (96.8 °F)-36.7 °C (98 °F)] 36.5 °C (97.7 °F)  Pulse:  [58-78] 78  Resp:  [10-50] 20  BP: ()/(57-98) 93/71  SpO2:  [89 %-99 %] 99 %  Physical Exam  Constitutional:       General: She is not in acute distress.     Appearance: She is not ill-appearing.   HENT:      Head: Normocephalic.      Comments: Bruises on around her left eye  Eyes:      General: No scleral icterus.  Cardiovascular:      Rate and Rhythm: Normal rate.      Heart sounds: No murmur heard.  Pulmonary:      Effort: No respiratory distress.      Breath sounds: No wheezing.   Abdominal:      General: There is no distension.      Tenderness: There is no guarding.   Musculoskeletal:         General: No deformity.      Right lower leg: No edema.      Left lower leg: No edema.   Skin:     Findings: No bruising, lesion or rash.   Neurological:      Mental Status: Mental status is at baseline.      Cranial Nerves: No cranial nerve deficit.      Motor: No weakness.           CAM  Acute onset and fluctuating course   No   Inattention                                         Yes  Disorganized thinking                        Yes  Altered level of consciousness          Yes    MiniCOG  Word recall (0-3 points)  Clock draw (0-2 points  Total score (0-5 points)    PHQ-2    Over the past 2 weeks, how often have you been bothered by any of the following problems? Not at all Several days More than half the days Nearly every day   Little interest or pleasure in doing things? 0 1 2 3          Feeling down, depressed, or hopeless? 0  1  2  3   Total point score: ____ ____ + ____ + ____ + ____         Laboratory  Recent Labs     03/04/24  1630   WBC 10.2   RBC 5.12   HEMOGLOBIN 14.8   HEMATOCRIT 43.8   MCV 85.5   MCH 28.9   MCHC 33.8    RDW 42.5   PLATELETCT 246   MPV 10.5     Recent Labs     03/04/24  1630   SODIUM 139   POTASSIUM 3.4*   CHLORIDE 103   CO2 20   GLUCOSE 154*   BUN 13   CREATININE 0.52   CALCIUM 9.3     Recent Labs     03/04/24  1630   APTT 27.1   INR 1.04               Imaging  DX-CHEST-PORTABLE (1 VIEW)   Final Result      1.  There are no acute displaced fractures and there is no pneumothorax.   2.  Borderline enlarged cardiac silhouette with chronic interstitial opacities.      CT-MAXILLOFACIAL W/O PLUS RECONS   Final Result      1.  Fractures of the LEFT zygomatic arch, LEFT greater sphenoid wing LEFT lateral orbital wall, squamous LEFT temporal bone and longitudinal fracture through the LEFT temporal bone with fluid in the LEFT middle ear   2.  4 mm LEFT temporal extra-axial hematoma with scant gas internal could be subdural or epidural   3.  LEFT lateral orbital extraconal hematoma with gas with mild LEFT proptosis   4.  Intravenous gas in the soft tissues and BILATERAL cavernous sinus      Findings were discussed with JOSEPHINE PIKE on 3/4/2024 4:35 PM.      CT-CSPINE WITHOUT PLUS RECONS   Final Result      1.  Moderate osteoarthritic change at C5-6 and C6-7 levels.      2.  No evidence for acute fracture and/or subluxation of cervical spine.      CT-HEAD W/O   Final Result   Addendum (preliminary) 1 of 1   Addendum:      There is a small  focus of increased attenuation noted adjacent to the    right anterior lateral temporal lobe consistent with small amount of    subarachnoid hemorrhage. There is no significant effacement of the    adjacent cortical sulci and gyri.      These findings were discussed with JOSEPHINE PIKE at 8:10 AM 3/5/2024         Final      1.  Cerebral atrophy.      2.  White matter lucencies most consistent with small vessel ischemic change versus demyelination or gliosis.      3. Sphenoid sinusitis.             Assessment/Plan  * SDH (subdural hematoma) (HCC)- (present on admission)  Assessment &  Plan  Trauma on board,  No need for surgery  No DVT prophylaxis    Dementia (HCC)  Assessment & Plan  Alert to herself only on her baseline  Likely moderate to advanced  Continue nonpharmacological treatment to avoid delirium  Continue Seroquel  Continue hydroxyzine for anxiety  Family planning to discharge the patient to the group home after discharge from here    Encounter for geriatric assessment- (present on admission)  Assessment & Plan  Patient has dementia and recurrent falls  Patient needs help in all daily activities  Patient is on high risk for delirium  Family planning to get group home after discharging from the hospital  Continue nonpharmacological treatment    High cholesterol- (present on admission)  Assessment & Plan  Continue home dose of rosuvastatin    Hypertension- (present on admission)  Assessment & Plan  Continue lisinopril  Avoid aggressive treatment    Multiple facial fractures, closed, initial encounter ([facility])- (present on admission)  Assessment & Plan  Trauma service on board  Pain management           Interventions to be considered in all patients in order to minimize the risk of delirium.   -do not disturb patient (vitals or lab draws) between the hours of 10 PM and 6 AM.  -ideally the patient should not sleep during the day and we should avoid day time naps.   -up in chair for meals  -ambulate at least three times daily, as able  -watch for constipation  -timed voiding - ask patient is she would like to go to the bathroom q 2-3 hours, except during the do not disturb hours.   -remove all necessary lines (central lines, peripheral IVs, feeding tubes, carriazles catheters)  -unless patient has shown harm to self or others I would recommend against use of restraints - either chemical or physical (antipsychotics)   -minimize polypharmacy, do not dose medication during sleep hours